# Patient Record
Sex: FEMALE | Race: BLACK OR AFRICAN AMERICAN | NOT HISPANIC OR LATINO | Employment: OTHER | URBAN - METROPOLITAN AREA
[De-identification: names, ages, dates, MRNs, and addresses within clinical notes are randomized per-mention and may not be internally consistent; named-entity substitution may affect disease eponyms.]

---

## 2017-03-27 ENCOUNTER — APPOINTMENT (OUTPATIENT)
Dept: MAMMOGRAPHY | Facility: CLINIC | Age: 62
End: 2017-03-27

## 2017-03-27 ENCOUNTER — OFFICE VISIT (OUTPATIENT)
Dept: OBSTETRICS AND GYNECOLOGY | Facility: CLINIC | Age: 62
End: 2017-03-27

## 2017-03-27 VITALS
BODY MASS INDEX: 25.27 KG/M2 | DIASTOLIC BLOOD PRESSURE: 74 MMHG | SYSTOLIC BLOOD PRESSURE: 118 MMHG | HEIGHT: 64 IN | WEIGHT: 148 LBS

## 2017-03-27 DIAGNOSIS — M81.0 OSTEOPOROSIS OF VERTEBRA: ICD-10-CM

## 2017-03-27 DIAGNOSIS — Z13.220 SCREENING FOR LIPID DISORDERS: ICD-10-CM

## 2017-03-27 DIAGNOSIS — Z13.29 SCREENING FOR THYROID DISORDER: ICD-10-CM

## 2017-03-27 DIAGNOSIS — Z78.0 MENOPAUSE: ICD-10-CM

## 2017-03-27 DIAGNOSIS — Z13.228 SCREENING FOR METABOLIC DISORDER: ICD-10-CM

## 2017-03-27 DIAGNOSIS — Z12.31 VISIT FOR SCREENING MAMMOGRAM: ICD-10-CM

## 2017-03-27 DIAGNOSIS — Z01.411 ENCOUNTER FOR GYNECOLOGICAL EXAMINATION WITH ABNORMAL FINDING: Primary | ICD-10-CM

## 2017-03-27 PROCEDURE — 99396 PREV VISIT EST AGE 40-64: CPT | Performed by: OBSTETRICS & GYNECOLOGY

## 2017-03-27 PROCEDURE — 77067 SCR MAMMO BI INCL CAD: CPT | Performed by: OBSTETRICS & GYNECOLOGY

## 2017-03-27 RX ORDER — ALENDRONATE SODIUM 70 MG/75ML
70 SOLUTION ORAL
Qty: 4 ML | Refills: 12 | Status: SHIPPED | OUTPATIENT
Start: 2017-03-27 | End: 2018-11-14

## 2017-03-27 NOTE — PROGRESS NOTES
Subjective     Julissa Armenta is a 61 y.o. female for annual gyn exam    History of Present Illness   61-year-old postmenopausal black female presents for yearly exam.  She denies vasomotor symptoms and has had no vaginal bleeding.  She has a history of osteoporosis of the spine and was started on Fosamax last year but discontinued the medication for a none known reason.  She is a  and has been taking nutritional supplementation for bone health.  She has a history of colon cancer in her grandmother and received a screening colonoscopy last year which was reassuring.  She received a mammogram in the office today.  Her last bone density exam was in November 2015.  He has no complaints.      The following portions of the patient's history were reviewed and updated as appropriate: allergies, current medications, past family history, past medical history, past social history, past surgical history and problem list.      Review of Systems   Constitutional: Negative for activity change, appetite change, fatigue and unexpected weight change.   HENT: Negative.    Eyes: Negative.    Respiratory: Negative.    Cardiovascular: Negative.    Gastrointestinal: Negative for abdominal distention, abdominal pain, anal bleeding, constipation, diarrhea, nausea and vomiting.   Endocrine: Negative for cold intolerance, heat intolerance, polydipsia, polyphagia and polyuria.   Genitourinary: Negative for difficulty urinating, dysuria, flank pain, frequency, hematuria and urgency.   Musculoskeletal: Negative.    Skin: Negative.    Allergic/Immunologic: Negative.    Neurological: Negative.    Hematological: Negative.    Psychiatric/Behavioral: Negative.        Objective     Physical Exam   Constitutional: She is oriented to person, place, and time. Vital signs are normal. She appears well-developed and well-nourished. She is cooperative.   HENT:   Head: Normocephalic.   Eyes: Conjunctivae are normal. Pupils are equal, round,  and reactive to light.   Neck: Normal range of motion. Neck supple. No tracheal deviation present. No thyromegaly present.   Cardiovascular: Normal rate, regular rhythm and normal heart sounds.  Exam reveals no gallop and no friction rub.    No murmur heard.  Pulmonary/Chest: Effort normal and breath sounds normal. No respiratory distress. She has no wheezes. Right breast exhibits no inverted nipple, no mass, no nipple discharge, no skin change and no tenderness. Left breast exhibits no inverted nipple, no mass, no nipple discharge, no skin change and no tenderness. Breasts are symmetrical. There is no breast swelling.   Abdominal: Soft. Bowel sounds are normal. She exhibits no distension, no ascites and no mass. There is no hepatosplenomegaly. There is no tenderness. There is no rebound, no guarding and no CVA tenderness. No hernia. Hernia confirmed negative in the right inguinal area and confirmed negative in the left inguinal area.   Genitourinary: Rectal exam shows no fissure, no mass, no tenderness and anal tone normal. Pelvic exam was performed with patient supine. No labial fusion. There is no rash, tenderness, lesion or injury on the right labia. There is no rash, tenderness, lesion or injury on the left labia. Uterus is not deviated, not enlarged, not fixed and not tender. Cervix exhibits no motion tenderness, no discharge and no friability. Right adnexum displays no mass, no tenderness and no fullness. Left adnexum displays no mass, no tenderness and no fullness. No erythema, tenderness or bleeding in the vagina. No foreign body in the vagina. No signs of injury around the vagina. No vaginal discharge found.   Musculoskeletal: Normal range of motion. She exhibits no edema or deformity.   Lymphadenopathy:     She has no cervical adenopathy.        Right: No inguinal adenopathy present.        Left: No inguinal adenopathy present.   Neurological: She is alert and oriented to person, place, and time. She has  normal reflexes. No cranial nerve deficit. Coordination normal.   Skin: Skin is warm and dry. No rash noted. No erythema.   Psychiatric: She has a normal mood and affect. Her behavior is normal.         Julissa was seen today for gynecologic exam.    Diagnoses and all orders for this visit:    Encounter for gynecological examination with abnormal finding  -     Pap IG, Rfx HPV ASCU    Menopause    Osteoporosis of vertebra  -     alendronate (FOSAMAX) 70 MG/75ML solution; Take 75 mL by mouth Every 7 (Seven) Days.    Screening for thyroid disorder  -     Thyroid Panel With TSH    Screening for metabolic disorder  -     CBC & Differential  -     Comprehensive Metabolic Panel  -     Hemoglobin A1c    Screening for lipid disorders  -     Lipid Panel     patient is doing well.  She agreed to retry starting the Fosamax for her osteoporosis.  She will call with any issues.  She will call for her lab results.

## 2017-03-28 LAB
ALBUMIN SERPL-MCNC: 4.3 G/DL (ref 3.5–5.2)
ALBUMIN/GLOB SERPL: 1.5 G/DL
ALP SERPL-CCNC: 78 U/L (ref 39–117)
ALT SERPL-CCNC: 25 U/L (ref 1–33)
AST SERPL-CCNC: 28 U/L (ref 1–32)
BASOPHILS # BLD AUTO: 0.02 10*3/MM3 (ref 0–0.2)
BASOPHILS NFR BLD AUTO: 0.5 % (ref 0–1.5)
BILIRUB SERPL-MCNC: 0.4 MG/DL (ref 0.1–1.2)
BUN SERPL-MCNC: 16 MG/DL (ref 8–23)
BUN/CREAT SERPL: 21.6 (ref 7–25)
CALCIUM SERPL-MCNC: 9.4 MG/DL (ref 8.6–10.5)
CHLORIDE SERPL-SCNC: 104 MMOL/L (ref 98–107)
CHOLEST SERPL-MCNC: 211 MG/DL (ref 0–200)
CO2 SERPL-SCNC: 26.9 MMOL/L (ref 22–29)
CREAT SERPL-MCNC: 0.74 MG/DL (ref 0.57–1)
EOSINOPHIL # BLD AUTO: 0.07 10*3/MM3 (ref 0–0.7)
EOSINOPHIL NFR BLD AUTO: 1.6 % (ref 0.3–6.2)
ERYTHROCYTE [DISTWIDTH] IN BLOOD BY AUTOMATED COUNT: 13.4 % (ref 11.7–13)
FT4I SERPL CALC-MCNC: 1.5 (ref 1.2–4.9)
GLOBULIN SER CALC-MCNC: 2.8 GM/DL
GLUCOSE SERPL-MCNC: 93 MG/DL (ref 65–99)
HBA1C MFR BLD: 5.8 % (ref 4.8–5.6)
HCT VFR BLD AUTO: 41.4 % (ref 35.6–45.5)
HDLC SERPL-MCNC: 90 MG/DL (ref 40–60)
HGB BLD-MCNC: 13.3 G/DL (ref 11.9–15.5)
IMM GRANULOCYTES # BLD: 0 10*3/MM3 (ref 0–0.03)
IMM GRANULOCYTES NFR BLD: 0 % (ref 0–0.5)
LDLC SERPL CALC-MCNC: 110 MG/DL (ref 0–100)
LYMPHOCYTES # BLD AUTO: 1.49 10*3/MM3 (ref 0.9–4.8)
LYMPHOCYTES NFR BLD AUTO: 34.3 % (ref 19.6–45.3)
MCH RBC QN AUTO: 29.8 PG (ref 26.9–32)
MCHC RBC AUTO-ENTMCNC: 32.1 G/DL (ref 32.4–36.3)
MCV RBC AUTO: 92.8 FL (ref 80.5–98.2)
MONOCYTES # BLD AUTO: 0.27 10*3/MM3 (ref 0.2–1.2)
MONOCYTES NFR BLD AUTO: 6.2 % (ref 5–12)
NEUTROPHILS # BLD AUTO: 2.5 10*3/MM3 (ref 1.9–8.1)
NEUTROPHILS NFR BLD AUTO: 57.4 % (ref 42.7–76)
PLATELET # BLD AUTO: 209 10*3/MM3 (ref 140–500)
POTASSIUM SERPL-SCNC: 4 MMOL/L (ref 3.5–5.2)
PROT SERPL-MCNC: 7.1 G/DL (ref 6–8.5)
RBC # BLD AUTO: 4.46 10*6/MM3 (ref 3.9–5.2)
SODIUM SERPL-SCNC: 144 MMOL/L (ref 136–145)
T3RU NFR SERPL: 29 % (ref 24–39)
T4 SERPL-MCNC: 5.2 UG/DL (ref 4.5–12)
TRIGL SERPL-MCNC: 53 MG/DL (ref 0–150)
TSH SERPL DL<=0.005 MIU/L-ACNC: 1.38 UIU/ML (ref 0.45–4.5)
VLDLC SERPL CALC-MCNC: 10.6 MG/DL (ref 5–40)
WBC # BLD AUTO: 4.35 10*3/MM3 (ref 4.5–10.7)

## 2017-03-30 LAB
CONV .: NORMAL
CYTOLOGIST CVX/VAG CYTO: NORMAL
CYTOLOGY CVX/VAG DOC THIN PREP: NORMAL
DX ICD CODE: NORMAL
HIV 1 & 2 AB SER-IMP: NORMAL
Lab: NORMAL
OTHER STN SPEC: NORMAL
PATH REPORT.FINAL DX SPEC: NORMAL
STAT OF ADQ CVX/VAG CYTO-IMP: NORMAL

## 2018-04-18 ENCOUNTER — TRANSCRIBE ORDERS (OUTPATIENT)
Dept: ADMINISTRATIVE | Facility: HOSPITAL | Age: 63
End: 2018-04-18

## 2018-04-18 DIAGNOSIS — Z12.39 SCREENING BREAST EXAMINATION: Primary | ICD-10-CM

## 2018-05-14 ENCOUNTER — HOSPITAL ENCOUNTER (OUTPATIENT)
Dept: MAMMOGRAPHY | Facility: HOSPITAL | Age: 63
Discharge: HOME | End: 2018-05-14
Attending: OBSTETRICS & GYNECOLOGY | Admitting: OBSTETRICS & GYNECOLOGY

## 2018-05-14 ENCOUNTER — OFFICE VISIT (OUTPATIENT)
Dept: OBSTETRICS AND GYNECOLOGY | Facility: CLINIC | Age: 63
End: 2018-05-14

## 2018-05-14 VITALS
WEIGHT: 150 LBS | DIASTOLIC BLOOD PRESSURE: 78 MMHG | BODY MASS INDEX: 25.61 KG/M2 | HEIGHT: 64 IN | SYSTOLIC BLOOD PRESSURE: 120 MMHG

## 2018-05-14 DIAGNOSIS — Z13.820 SCREENING FOR OSTEOPOROSIS: ICD-10-CM

## 2018-05-14 DIAGNOSIS — Z78.0 MENOPAUSE: ICD-10-CM

## 2018-05-14 DIAGNOSIS — Z01.419 ENCOUNTER FOR GYNECOLOGICAL EXAMINATION WITHOUT ABNORMAL FINDING: Primary | ICD-10-CM

## 2018-05-14 DIAGNOSIS — Z12.39 SCREENING BREAST EXAMINATION: ICD-10-CM

## 2018-05-14 DIAGNOSIS — Z78.0 POSTMENOPAUSAL: ICD-10-CM

## 2018-05-14 DIAGNOSIS — Z13.820 SCREENING FOR OSTEOPOROSIS: Primary | ICD-10-CM

## 2018-05-14 LAB
ALBUMIN SERPL-MCNC: 4.3 G/DL (ref 3.5–5.2)
ALBUMIN/GLOB SERPL: 1.7 G/DL
ALP SERPL-CCNC: 76 U/L (ref 39–117)
ALT SERPL-CCNC: 19 U/L (ref 1–33)
AST SERPL-CCNC: 24 U/L (ref 1–32)
BASOPHILS # BLD AUTO: 0.02 10*3/MM3 (ref 0–0.2)
BASOPHILS NFR BLD AUTO: 0.4 % (ref 0–1.5)
BILIRUB SERPL-MCNC: 0.3 MG/DL (ref 0.1–1.2)
BUN SERPL-MCNC: 16 MG/DL (ref 8–23)
BUN/CREAT SERPL: 19.3 (ref 7–25)
CALCIUM SERPL-MCNC: 9.5 MG/DL (ref 8.6–10.5)
CHLORIDE SERPL-SCNC: 105 MMOL/L (ref 98–107)
CHOLEST SERPL-MCNC: 213 MG/DL (ref 0–200)
CO2 SERPL-SCNC: 31.1 MMOL/L (ref 22–29)
CREAT SERPL-MCNC: 0.83 MG/DL (ref 0.57–1)
EOSINOPHIL # BLD AUTO: 0.09 10*3/MM3 (ref 0–0.7)
EOSINOPHIL NFR BLD AUTO: 1.8 % (ref 0.3–6.2)
ERYTHROCYTE [DISTWIDTH] IN BLOOD BY AUTOMATED COUNT: 13.5 % (ref 11.7–13)
GFR SERPLBLD CREATININE-BSD FMLA CKD-EPI: 69 ML/MIN/1.73
GFR SERPLBLD CREATININE-BSD FMLA CKD-EPI: 84 ML/MIN/1.73
GLOBULIN SER CALC-MCNC: 2.6 GM/DL
GLUCOSE SERPL-MCNC: 98 MG/DL (ref 65–99)
HCT VFR BLD AUTO: 42.8 % (ref 35.6–45.5)
HDLC SERPL-MCNC: 84 MG/DL (ref 40–60)
HGB BLD-MCNC: 13.5 G/DL (ref 11.9–15.5)
IMM GRANULOCYTES # BLD: 0 10*3/MM3 (ref 0–0.03)
IMM GRANULOCYTES NFR BLD: 0 % (ref 0–0.5)
LDLC SERPL CALC-MCNC: 119 MG/DL (ref 0–100)
LYMPHOCYTES # BLD AUTO: 1.53 10*3/MM3 (ref 0.9–4.8)
LYMPHOCYTES NFR BLD AUTO: 31.3 % (ref 19.6–45.3)
MCH RBC QN AUTO: 29.7 PG (ref 26.9–32)
MCHC RBC AUTO-ENTMCNC: 31.5 G/DL (ref 32.4–36.3)
MCV RBC AUTO: 94.3 FL (ref 80.5–98.2)
MONOCYTES # BLD AUTO: 0.42 10*3/MM3 (ref 0.2–1.2)
MONOCYTES NFR BLD AUTO: 8.6 % (ref 5–12)
NEUTROPHILS # BLD AUTO: 2.83 10*3/MM3 (ref 1.9–8.1)
NEUTROPHILS NFR BLD AUTO: 57.9 % (ref 42.7–76)
PLATELET # BLD AUTO: 219 10*3/MM3 (ref 140–500)
POTASSIUM SERPL-SCNC: 5 MMOL/L (ref 3.5–5.2)
PROT SERPL-MCNC: 6.9 G/DL (ref 6–8.5)
RBC # BLD AUTO: 4.54 10*6/MM3 (ref 3.9–5.2)
SODIUM SERPL-SCNC: 144 MMOL/L (ref 136–145)
TRIGL SERPL-MCNC: 52 MG/DL (ref 0–150)
TSH SERPL DL<=0.005 MIU/L-ACNC: 1.51 MIU/ML (ref 0.27–4.2)
VLDLC SERPL CALC-MCNC: 10.4 MG/DL (ref 5–40)
WBC # BLD AUTO: 4.89 10*3/MM3 (ref 4.5–10.7)

## 2018-05-14 PROCEDURE — 99396 PREV VISIT EST AGE 40-64: CPT | Performed by: OBSTETRICS & GYNECOLOGY

## 2018-05-14 PROCEDURE — 77063 BREAST TOMOSYNTHESIS BI: CPT

## 2018-05-14 PROCEDURE — 77067 SCR MAMMO BI INCL CAD: CPT

## 2018-05-14 NOTE — PROGRESS NOTES
Subjective    Julissa Armenta is a 62 y.o. female for annual gyn exam    History of Present Illness   62-year-old postmenopausal black female presents for routine gynecologic exam.  She denies vasomotor symptoms or vaginal bleeding.  She is not using any form of hormone replacement therapy.  She has a history of borderline osteoporosis.  She has not had a bone density assessment in several years.  She received a mammogram today.  She is current on her screening colonoscopies.  There is no family history of breast cancer.  The patient has no complaints.      The following portions of the patient's history were reviewed and updated as appropriate: allergies, current medications, past family history, past medical history, past social history, past surgical history and problem list.      Review of Systems   Constitutional: Negative for activity change, appetite change, fatigue and unexpected weight change.   HENT: Negative.    Eyes: Negative.    Respiratory: Negative.    Cardiovascular: Negative.    Gastrointestinal: Negative for abdominal distention, abdominal pain, anal bleeding, constipation, diarrhea, nausea and vomiting.   Endocrine: Negative for cold intolerance, heat intolerance, polydipsia, polyphagia and polyuria.   Genitourinary: Negative for difficulty urinating, dysuria, flank pain, frequency, hematuria, pelvic pain, urgency, vaginal bleeding and vaginal discharge.   Musculoskeletal: Negative.    Skin: Negative.    Allergic/Immunologic: Negative.    Neurological: Negative.    Hematological: Negative.    Psychiatric/Behavioral: Negative.            Physical Exam   Constitutional: She is oriented to person, place, and time. Vital signs are normal. She appears well-developed and well-nourished. She is cooperative.   HENT:   Head: Normocephalic.   Eyes: Conjunctivae are normal. Pupils are equal, round, and reactive to light.   Neck: Normal range of motion. Neck supple. No tracheal deviation present. No  thyromegaly present.   Cardiovascular: Normal rate, regular rhythm and normal heart sounds.  Exam reveals no gallop and no friction rub.    No murmur heard.  Pulmonary/Chest: Effort normal and breath sounds normal. No respiratory distress. She has no wheezes. Right breast exhibits no inverted nipple, no mass, no nipple discharge, no skin change and no tenderness. Left breast exhibits no inverted nipple, no mass, no nipple discharge, no skin change and no tenderness. Breasts are symmetrical. There is no breast swelling.   Abdominal: Soft. Bowel sounds are normal. She exhibits no distension, no ascites and no mass. There is no hepatosplenomegaly. There is no tenderness. There is no rebound, no guarding and no CVA tenderness. No hernia. Hernia confirmed negative in the right inguinal area and confirmed negative in the left inguinal area.   Genitourinary: Rectal exam shows no fissure, no mass, no tenderness and anal tone normal. Pelvic exam was performed with patient supine. No labial fusion. There is no rash, tenderness, lesion or injury on the right labia. There is no rash, tenderness, lesion or injury on the left labia. Uterus is not deviated, not enlarged, not fixed and not tender. Cervix exhibits no motion tenderness, no discharge and no friability. Right adnexum displays no mass, no tenderness and no fullness. Left adnexum displays no mass, no tenderness and no fullness. No erythema, tenderness or bleeding in the vagina. No foreign body in the vagina. No signs of injury around the vagina. No vaginal discharge found.   Genitourinary Comments: There is vulvovaginal atrophy.   Musculoskeletal: Normal range of motion. She exhibits no edema or deformity.   Lymphadenopathy:     She has no cervical adenopathy.        Right: No inguinal adenopathy present.        Left: No inguinal adenopathy present.   Neurological: She is alert and oriented to person, place, and time. She has normal reflexes. No cranial nerve deficit.  Coordination normal.   Skin: Skin is warm and dry. No rash noted. No erythema.   Psychiatric: She has a normal mood and affect. Her behavior is normal.         Julissa was seen today for gynecologic exam.    Diagnoses and all orders for this visit:    Encounter for gynecological examination without abnormal finding  -     CBC & Differential  -     Comprehensive Metabolic Panel  -     TSH  -     Lipid Panel  -     Pap IG, Rfx HPV ASCU    Menopause    Screening for osteoporosis  -     HM DEXA SCAN     patient is doing well.  She has not found a primary care physician and requests basic laboratory testing.  I encouraged continued nutritional supplementation and weightbearing exercise for bone health.  Annual exams were recommended.

## 2018-05-16 LAB
CONV .: NORMAL
CYTOLOGIST CVX/VAG CYTO: NORMAL
CYTOLOGY CVX/VAG DOC THIN PREP: NORMAL
DX ICD CODE: NORMAL
HIV 1 & 2 AB SER-IMP: NORMAL
OTHER STN SPEC: NORMAL
PATH REPORT.FINAL DX SPEC: NORMAL
STAT OF ADQ CVX/VAG CYTO-IMP: NORMAL

## 2018-11-14 ENCOUNTER — APPOINTMENT (OUTPATIENT)
Dept: MRI IMAGING | Facility: HOSPITAL | Age: 63
End: 2018-11-14

## 2018-11-14 ENCOUNTER — HOSPITAL ENCOUNTER (OUTPATIENT)
Facility: HOSPITAL | Age: 63
Setting detail: OBSERVATION
Discharge: HOME | End: 2018-11-15
Attending: EMERGENCY MEDICINE | Admitting: HOSPITALIST

## 2018-11-14 ENCOUNTER — APPOINTMENT (OUTPATIENT)
Dept: CT IMAGING | Facility: HOSPITAL | Age: 63
End: 2018-11-14

## 2018-11-14 DIAGNOSIS — G44.59 HEADACHE SYNDROME, COMPLICATED: Primary | ICD-10-CM

## 2018-11-14 DIAGNOSIS — I10 HYPERTENSION, UNSPECIFIED TYPE: ICD-10-CM

## 2018-11-14 DIAGNOSIS — R20.0 LEFT SIDED NUMBNESS: ICD-10-CM

## 2018-11-14 PROBLEM — R26.81 GAIT INSTABILITY: Status: ACTIVE | Noted: 2018-11-14

## 2018-11-14 PROBLEM — G43.109 COMPLICATED MIGRAINE: Status: ACTIVE | Noted: 2018-11-14

## 2018-11-14 PROBLEM — G45.9 TIA (TRANSIENT ISCHEMIC ATTACK): Status: ACTIVE | Noted: 2018-11-14

## 2018-11-14 LAB
ALBUMIN SERPL-MCNC: 4.2 G/DL (ref 3.5–5.2)
ALBUMIN/GLOB SERPL: 1.4 G/DL
ALP SERPL-CCNC: 71 U/L (ref 39–117)
ALT SERPL W P-5'-P-CCNC: 19 U/L (ref 1–33)
ANION GAP SERPL CALCULATED.3IONS-SCNC: 10.9 MMOL/L
AST SERPL-CCNC: 24 U/L (ref 1–32)
BASOPHILS # BLD AUTO: 0.01 10*3/MM3 (ref 0–0.2)
BASOPHILS NFR BLD AUTO: 0.2 % (ref 0–1.5)
BILIRUB SERPL-MCNC: 0.4 MG/DL (ref 0.1–1.2)
BUN BLD-MCNC: 18 MG/DL (ref 8–23)
BUN/CREAT SERPL: 24.3 (ref 7–25)
CALCIUM SPEC-SCNC: 9.3 MG/DL (ref 8.6–10.5)
CHLORIDE SERPL-SCNC: 107 MMOL/L (ref 98–107)
CO2 SERPL-SCNC: 24.1 MMOL/L (ref 22–29)
CREAT BLD-MCNC: 0.74 MG/DL (ref 0.57–1)
DEPRECATED RDW RBC AUTO: 44.2 FL (ref 37–54)
EOSINOPHIL # BLD AUTO: 0.02 10*3/MM3 (ref 0–0.7)
EOSINOPHIL NFR BLD AUTO: 0.4 % (ref 0.3–6.2)
ERYTHROCYTE [DISTWIDTH] IN BLOOD BY AUTOMATED COUNT: 13.2 % (ref 11.7–13)
GFR SERPL CREATININE-BSD FRML MDRD: 96 ML/MIN/1.73
GLOBULIN UR ELPH-MCNC: 3.1 GM/DL
GLUCOSE BLD-MCNC: 110 MG/DL (ref 65–99)
GLUCOSE BLDC GLUCOMTR-MCNC: 101 MG/DL (ref 70–130)
GLUCOSE BLDC GLUCOMTR-MCNC: 98 MG/DL (ref 70–130)
HCT VFR BLD AUTO: 39 % (ref 35.6–45.5)
HGB BLD-MCNC: 12.8 G/DL (ref 11.9–15.5)
HOLD SPECIMEN: NORMAL
HOLD SPECIMEN: NORMAL
IMM GRANULOCYTES # BLD: 0 10*3/MM3 (ref 0–0.03)
IMM GRANULOCYTES NFR BLD: 0 % (ref 0–0.5)
LYMPHOCYTES # BLD AUTO: 0.69 10*3/MM3 (ref 0.9–4.8)
LYMPHOCYTES NFR BLD AUTO: 14.9 % (ref 19.6–45.3)
MCH RBC QN AUTO: 29.9 PG (ref 26.9–32)
MCHC RBC AUTO-ENTMCNC: 32.8 G/DL (ref 32.4–36.3)
MCV RBC AUTO: 91.1 FL (ref 80.5–98.2)
MONOCYTES # BLD AUTO: 0.17 10*3/MM3 (ref 0.2–1.2)
MONOCYTES NFR BLD AUTO: 3.7 % (ref 5–12)
NEUTROPHILS # BLD AUTO: 3.75 10*3/MM3 (ref 1.9–8.1)
NEUTROPHILS NFR BLD AUTO: 80.8 % (ref 42.7–76)
PLATELET # BLD AUTO: 225 10*3/MM3 (ref 140–500)
PMV BLD AUTO: 9.7 FL (ref 6–12)
POTASSIUM BLD-SCNC: 3.8 MMOL/L (ref 3.5–5.2)
PROT SERPL-MCNC: 7.3 G/DL (ref 6–8.5)
RBC # BLD AUTO: 4.28 10*6/MM3 (ref 3.9–5.2)
SODIUM BLD-SCNC: 142 MMOL/L (ref 136–145)
TROPONIN T SERPL-MCNC: <0.01 NG/ML (ref 0–0.03)
WBC NRBC COR # BLD: 4.64 10*3/MM3 (ref 4.5–10.7)
WHOLE BLOOD HOLD SPECIMEN: NORMAL
WHOLE BLOOD HOLD SPECIMEN: NORMAL

## 2018-11-14 PROCEDURE — 93010 ELECTROCARDIOGRAM REPORT: CPT | Performed by: INTERNAL MEDICINE

## 2018-11-14 PROCEDURE — 85025 COMPLETE CBC W/AUTO DIFF WBC: CPT | Performed by: PHYSICIAN ASSISTANT

## 2018-11-14 PROCEDURE — 0 IOPAMIDOL PER 1 ML: Performed by: EMERGENCY MEDICINE

## 2018-11-14 PROCEDURE — 70496 CT ANGIOGRAPHY HEAD: CPT

## 2018-11-14 PROCEDURE — 82565 ASSAY OF CREATININE: CPT

## 2018-11-14 PROCEDURE — 93005 ELECTROCARDIOGRAM TRACING: CPT | Performed by: PHYSICIAN ASSISTANT

## 2018-11-14 PROCEDURE — 82962 GLUCOSE BLOOD TEST: CPT

## 2018-11-14 PROCEDURE — 70553 MRI BRAIN STEM W/O & W/DYE: CPT

## 2018-11-14 PROCEDURE — 80053 COMPREHEN METABOLIC PANEL: CPT | Performed by: PHYSICIAN ASSISTANT

## 2018-11-14 PROCEDURE — 99244 OFF/OP CNSLTJ NEW/EST MOD 40: CPT | Performed by: INTERNAL MEDICINE

## 2018-11-14 PROCEDURE — G0378 HOSPITAL OBSERVATION PER HR: HCPCS

## 2018-11-14 PROCEDURE — 99285 EMERGENCY DEPT VISIT HI MDM: CPT

## 2018-11-14 PROCEDURE — 84484 ASSAY OF TROPONIN QUANT: CPT | Performed by: PHYSICIAN ASSISTANT

## 2018-11-14 PROCEDURE — 70498 CT ANGIOGRAPHY NECK: CPT

## 2018-11-14 PROCEDURE — 0 GADOBENATE DIMEGLUMINE 529 MG/ML SOLUTION: Performed by: HOSPITALIST

## 2018-11-14 PROCEDURE — 0042T HC CT CEREBRAL PERFUSION W/WO CONTRAST: CPT

## 2018-11-14 PROCEDURE — A9577 INJ MULTIHANCE: HCPCS | Performed by: HOSPITALIST

## 2018-11-14 PROCEDURE — 99245 OFF/OP CONSLTJ NEW/EST HI 55: CPT | Performed by: PSYCHIATRY & NEUROLOGY

## 2018-11-14 RX ORDER — KETOROLAC TROMETHAMINE 15 MG/ML
15 INJECTION, SOLUTION INTRAMUSCULAR; INTRAVENOUS ONCE
Status: DISCONTINUED | OUTPATIENT
Start: 2018-11-14 | End: 2018-11-14

## 2018-11-14 RX ORDER — ONDANSETRON 2 MG/ML
4 INJECTION INTRAMUSCULAR; INTRAVENOUS EVERY 6 HOURS PRN
Status: DISCONTINUED | OUTPATIENT
Start: 2018-11-14 | End: 2018-11-15 | Stop reason: HOSPADM

## 2018-11-14 RX ORDER — SODIUM CHLORIDE 0.9 % (FLUSH) 0.9 %
3 SYRINGE (ML) INJECTION EVERY 12 HOURS SCHEDULED
Status: DISCONTINUED | OUTPATIENT
Start: 2018-11-14 | End: 2018-11-15 | Stop reason: HOSPADM

## 2018-11-14 RX ORDER — SODIUM CHLORIDE 0.9 % (FLUSH) 0.9 %
3-10 SYRINGE (ML) INJECTION AS NEEDED
Status: DISCONTINUED | OUTPATIENT
Start: 2018-11-14 | End: 2018-11-15 | Stop reason: HOSPADM

## 2018-11-14 RX ORDER — ASPIRIN 300 MG/1
300 SUPPOSITORY RECTAL DAILY
Status: DISCONTINUED | OUTPATIENT
Start: 2018-11-14 | End: 2018-11-15 | Stop reason: HOSPADM

## 2018-11-14 RX ORDER — ACETAMINOPHEN 325 MG/1
650 TABLET ORAL EVERY 6 HOURS PRN
Status: DISCONTINUED | OUTPATIENT
Start: 2018-11-14 | End: 2018-11-15 | Stop reason: HOSPADM

## 2018-11-14 RX ORDER — ONDANSETRON 4 MG/1
4 TABLET, FILM COATED ORAL EVERY 6 HOURS PRN
Status: DISCONTINUED | OUTPATIENT
Start: 2018-11-14 | End: 2018-11-15 | Stop reason: HOSPADM

## 2018-11-14 RX ORDER — ASPIRIN 325 MG
325 TABLET ORAL DAILY
Status: DISCONTINUED | OUTPATIENT
Start: 2018-11-14 | End: 2018-11-15 | Stop reason: HOSPADM

## 2018-11-14 RX ORDER — ASPIRIN 300 MG/1
300 SUPPOSITORY RECTAL DAILY
Status: DISCONTINUED | OUTPATIENT
Start: 2018-11-14 | End: 2018-11-14

## 2018-11-14 RX ORDER — SODIUM CHLORIDE 0.9 % (FLUSH) 0.9 %
10 SYRINGE (ML) INJECTION AS NEEDED
Status: DISCONTINUED | OUTPATIENT
Start: 2018-11-14 | End: 2018-11-15 | Stop reason: HOSPADM

## 2018-11-14 RX ORDER — AMLODIPINE BESYLATE 5 MG/1
5 TABLET ORAL
Status: DISCONTINUED | OUTPATIENT
Start: 2018-11-15 | End: 2018-11-15 | Stop reason: HOSPADM

## 2018-11-14 RX ORDER — ONDANSETRON 4 MG/1
4 TABLET, ORALLY DISINTEGRATING ORAL EVERY 6 HOURS PRN
Status: DISCONTINUED | OUTPATIENT
Start: 2018-11-14 | End: 2018-11-15 | Stop reason: HOSPADM

## 2018-11-14 RX ORDER — ATORVASTATIN CALCIUM 80 MG/1
80 TABLET, FILM COATED ORAL NIGHTLY
Status: DISCONTINUED | OUTPATIENT
Start: 2018-11-14 | End: 2018-11-15 | Stop reason: HOSPADM

## 2018-11-14 RX ORDER — ASPIRIN 81 MG/1
81 TABLET, CHEWABLE ORAL DAILY
Status: DISCONTINUED | OUTPATIENT
Start: 2018-11-14 | End: 2018-11-14

## 2018-11-14 RX ORDER — LABETALOL HYDROCHLORIDE 5 MG/ML
20 INJECTION, SOLUTION INTRAVENOUS ONCE
Status: DISCONTINUED | OUTPATIENT
Start: 2018-11-14 | End: 2018-11-14

## 2018-11-14 RX ADMIN — Medication 3 ML: at 20:09

## 2018-11-14 RX ADMIN — ATORVASTATIN CALCIUM 80 MG: 80 TABLET, FILM COATED ORAL at 20:08

## 2018-11-14 RX ADMIN — ASPIRIN 325 MG: 325 TABLET ORAL at 20:08

## 2018-11-14 RX ADMIN — IOPAMIDOL 150 ML: 755 INJECTION, SOLUTION INTRAVENOUS at 13:34

## 2018-11-14 RX ADMIN — GADOBENATE DIMEGLUMINE 14 ML: 529 INJECTION, SOLUTION INTRAVENOUS at 22:15

## 2018-11-14 NOTE — H&P
HISTORY AND PHYSICAL   [facility]        Patient Identification:  Name: Julissa Armenta  Age: 62 y.o.  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  She states that she came to the ER because she had the acute onset of headache today that was associated with dizziness and all this began a partially 645 this morning.  She states that she has a diagnosis of migraine headache in the past but she's never had any neurological deficits associated with one.  She states that as her day went on a proximally around the 10:00 hours started to get progressively worse.  She started to have some gait and balance and instability at that juncture but did not have any loss of consciousness and had no preceding aspects of chest pain.  A team D was called in the ER and the patient underwent CT angiogram as well as perfusion and those reports while pending work conveyed to me as being negative.  Her initial lab work in the ER has been unremarkable though her EKG is slightly abnormal.  She denies any past cardiac history such as atrial fibrillation heart failure or any other forms of heart disease.  Her blood pressures were quite elevated at admission and she denies any known previous diagnosis of hypertension.  She states when it's been checked in the past her blood pressures were always therapeutic.  She did receive IV labetalol in the ER.  Patient has alreadry been evaluated by neurologist Dr. Satya Cope and I have discussed the case with him as well as with the ER physician Dr. Mohan Whitfield.  At this juncture the focus of the remainder of the workup is towards MRI with and without contrast as well as echocardiogram and PT evaluation.  Patient will be  maintained on aspirin and statin for now with further recommendations to follow    Past Medical History:  Past Medical History:   Diagnosis Date   • Disease of thyroid gland      Past Surgical History:  Past Surgical History:   Procedure Laterality Date   • COLONOSCOPY      2015   • FINGER SURGERY     • THYROID SURGERY        Home Meds:    (Not in a hospital admission)    Allergies:  Allergies   Allergen Reactions   • Cephalexin Rash     Immunizations:    There is no immunization history on file for this patient.  Social History:   Social History     Social History Narrative   • Not on file     Social History     Socioeconomic History   • Marital status: Single     Spouse name: Not on file   • Number of children: Not on file   • Years of education: Not on file   • Highest education level: Not on file   Social Needs   • Financial resource strain: Not on file   • Food insecurity - worry: Not on file   • Food insecurity - inability: Not on file   • Transportation needs - medical: Not on file   • Transportation needs - non-medical: Not on file   Occupational History   • Not on file   Tobacco Use   • Smoking status: Never Smoker   • Smokeless tobacco: Never Used   Substance and Sexual Activity   • Alcohol use: No   • Drug use: No   • Sexual activity: Defer   Other Topics Concern   • Not on file   Social History Narrative   • Not on file       Family History:  Family History   Problem Relation Age of Onset   • Colon cancer Maternal Grandmother         Review of Systems  See history of present illness and past medical history.  Patient denies any focal changes to her smell taste or sound though admits to dizziness without loss of consciousness or loss of function though she did have associated gait instability and ataxia.  She has had some photophobia but denies any focal loss of vision.  Admits to some headache as well as neck discomfort but denies any problems swallowing though she has had a past history of esophageal  "dilatation.  She denies chest pain palpitations cough shortness of breath abdominal pain dysuria bruising bleeding.  Remainder of ROS is negative.    Objective:  tMax 24 hrs: Temp (24hrs), Av.3 °F (36.3 °C), Min:97.3 °F (36.3 °C), Max:97.3 °F (36.3 °C)    Vitals Ranges:   Temp:  [97.3 °F (36.3 °C)] 97.3 °F (36.3 °C)  Heart Rate:  [64-92] 64  Resp:  [16-18] 18  BP: (161-192)/() 176/96      Exam:  /96   Pulse 64   Temp 97.3 °F (36.3 °C) (Tympanic)   Resp 18   Ht 162.6 cm (64\")   Wt 68.1 kg (150 lb 2.1 oz)   SpO2 99%   BMI 25.77 kg/m²     General Appearance:    Alert, cooperative, no distress, AOx3, fluent speech, mentation intact, friend at bs    Head:    Normocephalic, without obvious abnormality, atraumatic   Eyes:    PERRL, conjunctiva/corneas clear, EOM's intact, both eyes   Ears:    Normal external ear canals, both ears   Nose:   Nares normal, septum midline, mucosa normal, no drainage    or sinus tenderness   Throat:   Lips, mucosa, and tongue normal   Neck:   Supple, symmetrical, trachea midline, no adenopathy;     thyroid:  no enlargement/tenderness/nodules; no JVD       Lungs:     Clear to auscultation bilaterally, respirations unlabored        Heart:    Regular rate and rhythm, S1 and S2 normal, no murmur, rub   or gallop   Abdomen:     Soft, non-tender, bowel sounds active all four quadrants,     no masses   Extremities:   Extremities normal, atraumatic, no cyanosis or edema   Pulses:   2+ and symmetric all extremities       Lymph nodes:   Cervical and supraclavicular nodes normal   Neurologic:   CNII-XII intact, moving all w/out focal deficit, neg FTN/HTS, did not evaluate gait given reported instability       .    Data Review:  Labs in chart were reviewed.             Imaging Results (all)     Procedure Component Value Units Date/Time    CT Angiogram Neck With & Without Contrast [[id number]] Updated:  18 1335    CT Angiogram Head With & Without Contrast [[id number]] Updated:  " 11/14/18 1335    CT Cerebral Perfusion With & Without Contrast [[id number]] Updated:  11/14/18 1335            Assessment:    TIA (transient ischemic attack)    Complicated migraine    Gait instability    HTN (hypertension)      Plan:  TIA vs Hypertensive encephalopathy vs Complicated/Atypical Migraine    -MRI w/ and w/o but no need for MRA as CTA completed    -ASA/statin for now    -PT eval for gait instability - hold OT/ST if passes bedside swallow as no complaints of dysphagia    -check A1c/FLP/TSH/Folate/b12    HTN - no previous Dx - start Norvasc in am tomorrow if MRI neg AND SBP remains >140    SCDs for DVT ppx     Further recommendations to follow as clinical course unfolds    Nael Fitch MD  11/14/2018  3:07 PM

## 2018-11-14 NOTE — ED NOTES
Pt c/o left sided headache with dizziness and nausea. Pt states that it started early this AM but worsened around 10:00 am.     Lilliana Modi RN  11/14/18 1235       Lilliana Modi RN  11/14/18 1232

## 2018-11-14 NOTE — CONSULTS
Neurology Consult Note    Consult Date: 11/14/2018    Referring MD: Nael Fitch MD    Reason for Consult I have been asked to see the patient in neurological consultation to render advice and opinion regarding ataxia, headache, possible stroke    Julissa Armenta is a 62 y.o. female with past medical history of migraine headache, thyroid disease, hypertension.  She developed left frontal and facial headache this morning around 9 AM associated with left posterior neck pain.  This gradually got worse until 11 AM the pain was very severe with some periorbital tenderness to palpation.  She did have associated nausea, photophobia as well as numbness on the left side of the face.  She noticed around this time that she suddenly became ataxic, and tripping over her feet when trying to walk.  She presented to our ER for medical attention.  I was called and asked for team D imaging for possible cerebellar stroke.  Since arrival her headache has significantly improved, however her left facial numbness and dizziness have persisted.  Patient also has a history of intermittent heart palpitations.    Past Medical/Surgical Hx:  Past Medical History:   Diagnosis Date   • Disease of thyroid gland      Past Surgical History:   Procedure Laterality Date   • COLONOSCOPY      2015   • FINGER SURGERY     • THYROID SURGERY         Medications On Admission  None    Allergies:  Allergies   Allergen Reactions   • Cephalexin Rash       Social Hx:  Social History     Socioeconomic History   • Marital status: Single     Spouse name: Not on file   • Number of children: Not on file   • Years of education: Not on file   • Highest education level: Not on file   Social Needs   • Financial resource strain: Not on file   • Food insecurity - worry: Not on file   • Food insecurity - inability: Not on file   • Transportation needs - medical: Not on file   • Transportation needs - non-medical: Not on file   Occupational History   • Not on file  "  Tobacco Use   • Smoking status: Never Smoker   • Smokeless tobacco: Never Used   Substance and Sexual Activity   • Alcohol use: No   • Drug use: No   • Sexual activity: Defer   Other Topics Concern   • Not on file   Social History Narrative   • Not on file       Family Hx:  Family History   Problem Relation Age of Onset   • Colon cancer Maternal Grandmother        Review of Systems  Constitutional: [No fevers, chills]  Eye: [+ recent visual problems, no eye discharge]  HEENT: [No ear pain, nasal congestion]  Respiratory: [No shortness of breath, cough]  Cardiovascular: [No Chest pain, + palpitations]  Gastrointestinal: [No nausea, vomiting]  Genitourinary: [No hematuria, incontinence]  Hema/Lymph: [no nosebleeds, history of anticoagulation]  Endocrine: [Negative for excessive urination, heat or cold intolerance]  Musculoskeletal: [No back pain, neck pain]  Integumentary: [No rash, pruritus]  Neurologic: [+ weakness, numbness]  Psychiatric: [No anxiety, depression]    Exam    /96   Pulse 62   Temp 97.3 °F (36.3 °C) (Tympanic)   Resp 18   Ht 162.6 cm (64\")   Wt 68.1 kg (150 lb 2.1 oz)   SpO2 100%   BMI 25.77 kg/m²   gen: NAD, vitals reviewed  Eyes: fundus sharp with no papilledema or retinal hemorrhages  CVS: RRR, S1, S2, no carotid bruits  MS: oriented x3, recent/remote memory intact, normal attention/concentration, language intact, no neglect, normal fund of knowledge  CN: visual acuity grossly normal, visual fields full, PERRL, EOMI, diminished left facial sensation to light touch and temperature, no facial droop, hearing symmetric, no dysarthria, shoulder shrug equal, tongue midline  Motor: 5/5 throughout upper and lower extremities, normal tone  Sensation: intact to vibration and temperature throughout  Reflexes: 2+ throughout upper and lower extremities, downgoing plantars  Coordination: no dysmetria with finger to nose bilaterally  Gait: Wide-based    DATA:    Lab Results   Component Value Date "    GLUCOSE 110 (H) 11/14/2018    CALCIUM 9.3 11/14/2018     11/14/2018    K 3.8 11/14/2018    CO2 24.1 11/14/2018     11/14/2018    BUN 18 11/14/2018    CREATININE 0.74 11/14/2018    EGFRIFAFRI 96 11/14/2018    EGFRIFNONA 69 05/14/2018    BCR 24.3 11/14/2018    ANIONGAP 10.9 11/14/2018     Lab Results   Component Value Date    WBC 4.64 11/14/2018    HGB 12.8 11/14/2018    HCT 39.0 11/14/2018    MCV 91.1 11/14/2018     11/14/2018     No results found for: CHOL  Lab Results   Component Value Date    HDL 84 (H) 05/14/2018    HDL 90 (H) 03/27/2017     Lab Results   Component Value Date     (H) 05/14/2018     (H) 03/27/2017     Lab Results   Component Value Date    TRIG 52 05/14/2018    TRIG 53 03/27/2017     Lab Results   Component Value Date    HGBA1C 5.80 (H) 03/27/2017     No results found for: INR, PROTIME    Lab review: CBC, BMP within normal limits    Imaging review: I personally reviewed her CT head, CTA/CTP.  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  CTA shows no significant stenosis in the extracranial or intracranial circulation, no vertebral dissection seen.  CTP was negative.  I discussed these images with Dr. Ramirez the reading neuroradiologist.    Discussed with Dr. Fitch plans to get MRI wo/w to characterize her hemorrhagic lesion and rule out cerebellar stroke.    Impression:  1) acute ataxia, improving  2) migraine headache with sensory aura, improving  3) abnormal head CT  4) hypertension    Comment: 62-year-old woman with migraine headache, no history of stroke or CAD, presents with acute onset ataxia in the setting of migraine headache.  This could be a symptom of her migraine however ataxia is not a common complaint associated with migraine.  CTA head and neck are reassuring.  Plan is to get MRI to exclude cerebellar stroke.  CT head did show a small hemorrhagic  lesion in the right parietal lobe.  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    PLAN:  1.  MRI brain with and without to exclude stroke and better characterize her hemorrhagic lesion  2.  2-D echo given report of intermittent heart palpitations and suspected arrhythmia

## 2018-11-14 NOTE — ED NOTES
Patient had a left sided headache with left sided facial numbness but no drooping. Patient states earlier she also felt off balance and dizzy.      Brooklyn Cornelius RN  11/14/18 6025

## 2018-11-14 NOTE — ED PROVIDER NOTES
EMERGENCY DEPARTMENT ENCOUNTER    Room number:  32/32  Date Seen:  11/14/2018  Time of transfer:1300  PCP:  Provider, No Known    Laboratory Results:  Recent Results (from the past 24 hour(s))   Light Blue Top    Collection Time: 11/14/18 12:39 PM   Result Value Ref Range    Extra Tube hold for add-on    Lavender Top    Collection Time: 11/14/18 12:39 PM   Result Value Ref Range    Extra Tube hold for add-on    Gold Top - SST    Collection Time: 11/14/18 12:39 PM   Result Value Ref Range    Extra Tube Hold for add-ons.    CBC Auto Differential    Collection Time: 11/14/18 12:39 PM   Result Value Ref Range    WBC 4.64 4.50 - 10.70 10*3/mm3    RBC 4.28 3.90 - 5.20 10*6/mm3    Hemoglobin 12.8 11.9 - 15.5 g/dL    Hematocrit 39.0 35.6 - 45.5 %    MCV 91.1 80.5 - 98.2 fL    MCH 29.9 26.9 - 32.0 pg    MCHC 32.8 32.4 - 36.3 g/dL    RDW 13.2 (H) 11.7 - 13.0 %    RDW-SD 44.2 37.0 - 54.0 fl    MPV 9.7 6.0 - 12.0 fL    Platelets 225 140 - 500 10*3/mm3    Neutrophil % 80.8 (H) 42.7 - 76.0 %    Lymphocyte % 14.9 (L) 19.6 - 45.3 %    Monocyte % 3.7 (L) 5.0 - 12.0 %    Eosinophil % 0.4 0.3 - 6.2 %    Basophil % 0.2 0.0 - 1.5 %    Immature Grans % 0.0 0.0 - 0.5 %    Neutrophils, Absolute 3.75 1.90 - 8.10 10*3/mm3    Lymphocytes, Absolute 0.69 (L) 0.90 - 4.80 10*3/mm3    Monocytes, Absolute 0.17 (L) 0.20 - 1.20 10*3/mm3    Eosinophils, Absolute 0.02 0.00 - 0.70 10*3/mm3    Basophils, Absolute 0.01 0.00 - 0.20 10*3/mm3    Immature Grans, Absolute 0.00 0.00 - 0.03 10*3/mm3   Green Top (Gel)    Collection Time: 11/14/18 12:40 PM   Result Value Ref Range    Extra Tube Hold for add-ons.    Comprehensive Metabolic Panel    Collection Time: 11/14/18 12:40 PM   Result Value Ref Range    Glucose 110 (H) 65 - 99 mg/dL    BUN 18 8 - 23 mg/dL    Creatinine 0.74 0.57 - 1.00 mg/dL    Sodium 142 136 - 145 mmol/L    Potassium 3.8 3.5 - 5.2 mmol/L    Chloride 107 98 - 107 mmol/L    CO2 24.1 22.0 - 29.0 mmol/L    Calcium 9.3 8.6 - 10.5 mg/dL    Total  Protein 7.3 6.0 - 8.5 g/dL    Albumin 4.20 3.50 - 5.20 g/dL    ALT (SGPT) 19 1 - 33 U/L    AST (SGOT) 24 1 - 32 U/L    Alkaline Phosphatase 71 39 - 117 U/L    Total Bilirubin 0.4 0.1 - 1.2 mg/dL    eGFR  African Amer 96 >60 mL/min/1.73    Globulin 3.1 gm/dL    A/G Ratio 1.4 g/dL    BUN/Creatinine Ratio 24.3 7.0 - 25.0    Anion Gap 10.9 mmol/L   Troponin    Collection Time: 11/14/18 12:40 PM   Result Value Ref Range    Troponin T <0.010 0.000 - 0.030 ng/mL   POC Glucose Once    Collection Time: 11/14/18  1:05 PM   Result Value Ref Range    Glucose 98 70 - 130 mg/dL     I reviewed the above results.    Radiology:  CT Angiogram Neck With & Without Contrast    (Results Pending)   CT Angiogram Head With & Without Contrast    (Results Pending)   CT Cerebral Perfusion With & Without Contrast    (Results Pending)   Reviewed CTP and CTA head and neck which show nothing acute. Independently viewed by me. Interpreted by radiologist. Discussed with Dr. Cope (neuro).    I reviewed the above results    Medications ordered in ED:  Medications   sodium chloride 0.9 % flush 10 mL (not administered)   iopamidol (ISOVUE-370) 76 % injection 150 mL (150 mL Intravenous Given by Other 11/14/18 3474)       Physical Exam:  GENERAL: not distressed  HENT: nares patent  EYES: no scleral icterus  CV: regular rhythm, regular rate  RESPIRATORY: normal effort  ABDOMEN: soft  MUSCULOSKELETAL: no deformity  NEURO: alert and oriented x3, DIAZ, slight drift in the left leg, minimal decreased sensation on the left side,   SKIN: warm, dry    Vital signs and nursing notes reviewed.    Procedures:  Interval: baseline  1a. Level of Consciousness: 0-->Alert, keenly responsive  1b. LOC Questions: 0-->Answers both questions correctly  1c. LOC Commands: 0-->Performs both tasks correctly  2. Best Gaze: 0-->Normal  3. Visual: 0-->No visual loss  4. Facial Palsy: 0-->Normal symmetrical movements  5a. Motor Arm, Left: 0-->No drift, limb holds 90 (or 45) degrees  for full 10 secs  5b. Motor Arm, Right: 0-->No drift, limb holds 90 (or 45) degrees for full 10 secs  6a. Motor Leg, Left: 1-->Drift, leg falls by the end of the 5-sec period but does not hit bed  6b. Motor Leg, Right: 0-->No drift, leg holds 30 degree position for full 5 secs  7. Limb Ataxia: 0-->Absent  8. Sensory: 1-->Mild-to-moderate sensory loss, patient feels pinprick is less sharp or is dull on the affected side, or there is a loss of superficial pain with pinprick, but patient is aware of being touched  9. Best Language: 0-->No aphasia, normal  10. Dysarthria: 0-->Normal  11. Extinction and Inattention (formerly Neglect): 0-->No abnormality    Total (NIH Stroke Scale): 2    EKG          EKG time: 1401  Rhythm/Rate: SR, 65  P waves and GA: LAE  QRS, axis: LAD, LVH    ST and T waves: inferior T wave inversion     Interpreted Contemporaneously by me, independently viewed  changed compared to prior 4/12/12    Progress and Consult Notes:  1300    Patient care transferred from Gloria Metcalf.    1305  Pt presents with a HA that began 0630 this morning. She has also developed decreased sensation on the left side of her face and dizziness that began at 1100. She took two advil for her HA with minimal relief. Pt states that she has a h/x of migraines but has not been officially diagnosed. She has had complex migraine sympotms in the past. Gloria Metcalf PA-C spoke to Dr. Cope (neuro) and he would like to order a CTP and CTA head for further evaluation. At presentation, Pt's BP is 193/110.        1352  Dr. Cope in the ER for consult. He would like to admit Pt for further evaluation due to her symptoms and arrhythmia's noted on the monitor. Her CTP and CTA head and neck were negative, however he would still like her to be admitted. I will place call for admission.    1357  Placed call to [facility] for admission.     1415  Rechecked Pt and informed her that she will be admitted for further evaluation and treatment. Her scans  were negative but Dr. Cope would like to admit her for additional workup. Pt understands and agrees to all plans. All questions answered.     1443  Discussed Pt's case with Dr. Fitch ([facility]) who agrees to admit Pt to telemetry for further evaluation and treatment.       Diagnosis:  Final diagnoses:   Headache syndrome, complicated   Left sided numbness   Hypertension, unspecified type       ADMISSION    Discussed treatment plan and reason for admission with pt/family and admitting physician.  Pt/family voiced understanding of the plan for admission for further testing/treatment as needed.       Provider attestation:  I personally reviewed the past medical history, past surgical history, social history, family history, current medications, and allergies as they appear in the chart.            Dana Ashton  11/14/18 1446       Mohan Whitfield MD  11/14/18 1145

## 2018-11-14 NOTE — ED PROVIDER NOTES
"EMERGENCY DEPARTMENT ENCOUNTER    Room Number:  05/05  Date seen:  11/14/2018  Time seen: 12:52 PM  PCP: Provider, No Known    HPI:  Chief complaint: headache  Context:Julissa Armenta is a 62 y.o. female who presents to the ED with c/o headache and dizziness. She states she started with a headache x around 6:45 this morning which gradually progressed to severe around 10am. It is now improved and mild, but around 11am started feeling nauseated and dizzy. She describes the dizziness as feeling \"clumsy, tripping over myself.\" She reports photophobia and phonophobia, denies chest pain and shortness of breath as well as nausea and vomiting. No formal diagnosis of migraines or headaches and she dneies having any previous brain imaging.     Onset: gradual  Location: head  Radiation: none  Duration: x this am  Timing: constant  Character:pressure  Aggravating Factors: light/sound  Alleviating Factors: none  Severity: mild currently      ALLERGIES  Cephalexin    PAST MEDICAL HISTORY  Active Ambulatory Problems     Diagnosis Date Noted   • Menopause 05/14/2018     Resolved Ambulatory Problems     Diagnosis Date Noted   • No Resolved Ambulatory Problems     Past Medical History:   Diagnosis Date   • Disease of thyroid gland        PAST SURGICAL HISTORY  Past Surgical History:   Procedure Laterality Date   • COLONOSCOPY      2015   • FINGER SURGERY     • THYROID SURGERY         FAMILY HISTORY  Family History   Problem Relation Age of Onset   • Colon cancer Maternal Grandmother        SOCIAL HISTORY  Social History     Socioeconomic History   • Marital status: Single     Spouse name: Not on file   • Number of children: Not on file   • Years of education: Not on file   • Highest education level: Not on file   Social Needs   • Financial resource strain: Not on file   • Food insecurity - worry: Not on file   • Food insecurity - inability: Not on file   • Transportation needs - medical: Not on file   • Transportation needs - " non-medical: Not on file   Occupational History   • Not on file   Tobacco Use   • Smoking status: Never Smoker   • Smokeless tobacco: Never Used   Substance and Sexual Activity   • Alcohol use: No   • Drug use: No   • Sexual activity: Defer   Other Topics Concern   • Not on file   Social History Narrative   • Not on file       REVIEW OF SYSTEMS  Review of Systems   Constitutional: Negative for chills and fever.   HENT:        +phonophobia   Eyes: Positive for photophobia.   Respiratory: Negative for shortness of breath.    Cardiovascular: Negative for chest pain.   Gastrointestinal: Negative for abdominal pain, nausea and vomiting.   Genitourinary: Negative.    Musculoskeletal: Negative.    Skin: Negative.    Neurological: Positive for dizziness and headaches.   Psychiatric/Behavioral: Negative.        PHYSICAL EXAM  ED Triage Vitals   Temp Heart Rate Resp BP SpO2   11/14/18 1225 11/14/18 1225 11/14/18 1225 11/14/18 1225 11/14/18 1225   97.3 °F (36.3 °C) 75 18 172/90 97 %      Temp src Heart Rate Source Patient Position BP Location FiO2 (%)   11/14/18 1225 11/14/18 1237 -- 11/14/18 1237 --   Tympanic Monitor  Right arm      Physical Exam   Constitutional: She is oriented to person, place, and time and well-developed, well-nourished, and in no distress.   HENT:   Head: Normocephalic and atraumatic.   Right Ear: External ear normal.   Left Ear: External ear normal.   Nose: Nose normal.   Eyes: Conjunctivae are normal.   Neck: Normal range of motion.   Cardiovascular: Normal rate and regular rhythm.   Pulmonary/Chest: Effort normal and breath sounds normal.   Musculoskeletal: Normal range of motion.   Neurological: She is alert and oriented to person, place, and time.   GCS is 15  Cranial nerves II-XII are intact.  No facial droop. Uvula is midline. No tongue deviation. PERRL and EOMI. There is no dysarthria, aphasia or slurred speech.  Decreased sensation in the left face in V1, V2, and V3. Symmetrical sensation to  light touch intact in the BUE and BLE.  Strength is 5/5 and symmetrical in the BUE and BLE.  Finger to nose, heel to shin, and rapid alternating movements are intact.  Gait is unsteady     Skin: Skin is warm and dry.   Psychiatric: Affect normal.   Nursing note and vitals reviewed.      LAB RESULTS  Labs Reviewed   COMPREHENSIVE METABOLIC PANEL - Abnormal; Notable for the following components:       Result Value    Glucose 110 (*)     All other components within normal limits   CBC WITH AUTO DIFFERENTIAL - Abnormal; Notable for the following components:    RDW 13.2 (*)     Neutrophil % 80.8 (*)     Lymphocyte % 14.9 (*)     Monocyte % 3.7 (*)     Lymphocytes, Absolute 0.69 (*)     Monocytes, Absolute 0.17 (*)     All other components within normal limits   LIPID PANEL - Abnormal; Notable for the following components:    HDL Cholesterol 75 (*)     All other components within normal limits    Narrative:     Cholesterol Reference Ranges  (U.S. Department of Health and Human Services ATP III Classifications)    Desirable          <200 mg/dL  Borderline High    200-239 mg/dL  High Risk          >240 mg/dL      Triglyceride Reference Ranges  (U.S. Department of Health and Human Services ATP III Classifications)    Normal           <150 mg/dL  Borderline High  150-199 mg/dL  High             200-499 mg/dL  Very High        >500 mg/dL    HDL Reference Ranges  (U.S. Department of Health and Human Services ATP III Classifcations)    Low     <40 mg/dl (major risk factor for CHD)  High    >60 mg/dl ('negative' risk factor for CHD)        LDL Reference Ranges  (U.S. Department of Health and Human Services ATP III Classifcations)    Optimal          <100 mg/dL  Near Optimal     100-129 mg/dL  Borderline High  130-159 mg/dL  High             160-189 mg/dL  Very High        >189 mg/dL   TROPONIN (IN-HOUSE) - Normal    Narrative:     Troponin T Reference Ranges:  Less than 0.03 ng/mL:    Negative for AMI  0.03 to 0.09 ng/mL:       Indeterminant for AMI  Greater than 0.09 ng/mL: Positive for AMI   HEMOGLOBIN A1C - Normal    Narrative:     Hemoglobin A1C Ranges:    Increased Risk for Diabetes  5.7% to 6.4%  Diabetes                     >= 6.5%  Diabetic Goal                < 7.0%   TSH - Normal   FOLATE - Normal   VITAMIN B12 - Normal   POCT GLUCOSE FINGERSTICK - Normal   POCT GLUCOSE FINGERSTICK - Normal   POCT GLUCOSE FINGERSTICK - Normal   RAINBOW DRAW    Narrative:     The following orders were created for panel order [place] Draw.  Procedure                               Abnormality         Status                     ---------                               -----------         ------                     Light Blue Top[[id number]]                                   Final result               Green Top (Gel)[[id number]]                                  Final result               Lavender Top[[id number]]                                     Final result               Gold Top - SST[[id number]]                                   Final result                 Please view results for these tests on the individual orders.   POCT GLUCOSE FINGERSTICK   POCT GLUCOSE FINGERSTICK   POCT GLUCOSE FINGERSTICK   LIGHT BLUE TOP   GREEN TOP   LAVENDER TOP   GOLD TOP - SST   CBC AND DIFFERENTIAL    Narrative:     The following orders were created for panel order CBC & Differential.  Procedure                               Abnormality         Status                     ---------                               -----------         ------                     CBC Auto Differential[[id number]]        Abnormal            Final result                 Please view results for these tests on the individual orders.       I ordered the above labs and reviewed the results    RADIOLOGY  CT Head Without Contrast    (Results Pending)       I ordered the above noted radiological studies and reviewed the images on the PACS system.      MEDICATIONS GIVEN IN ER  Medications   sodium  chloride 0.9 % flush 10 mL (not administered)   prochlorperazine (COMPAZINE) injection 10 mg (not administered)   ketorolac (TORADOL) injection 15 mg (not administered)       EKG  Interpreted by ED Physician    PROCEDURES  Procedures      PROGRESS AND CONSULTS    Progress Notes:    ED Course as of Nov 14 1304   Wed Nov 14, 2018   1303 Discussed with Dr. Cope of the stroke team. He recommends TEAM D, CTA head and neck and CT perfusion and will meet the patient in CT.  [KA]      ED Course User Index  [KA] Debra Metcalf PA         1309 Reviewed pt's history and workup with Dr. Whitfield who is going now to perform a bedside evaluation and will take over course of care at this time.         Debra Metcalf PA  11/16/18 [id number]

## 2018-11-15 ENCOUNTER — APPOINTMENT (OUTPATIENT)
Dept: CARDIOLOGY | Facility: HOSPITAL | Age: 63
End: 2018-11-15
Attending: PSYCHIATRY & NEUROLOGY

## 2018-11-15 VITALS
HEIGHT: 64 IN | WEIGHT: 145 LBS | RESPIRATION RATE: 18 BRPM | OXYGEN SATURATION: 99 % | BODY MASS INDEX: 24.75 KG/M2 | HEART RATE: 62 BPM | SYSTOLIC BLOOD PRESSURE: 133 MMHG | DIASTOLIC BLOOD PRESSURE: 73 MMHG | TEMPERATURE: 98.1 F

## 2018-11-15 PROBLEM — I10 HYPERTENSIVE DISORDER: Status: ACTIVE | Noted: 2018-11-14

## 2018-11-15 PROBLEM — R26.81 GAIT INSTABILITY: Status: RESOLVED | Noted: 2018-11-14 | Resolved: 2018-11-15

## 2018-11-15 LAB
AORTIC DIMENSIONLESS INDEX: 0.8 (DI)
BH CV ECHO MEAS - ACS: 1.9 CM
BH CV ECHO MEAS - AO MEAN PG (FULL): 3 MMHG
BH CV ECHO MEAS - AO MEAN PG: 7 MMHG
BH CV ECHO MEAS - AO ROOT AREA (BSA CORRECTED): 1.6
BH CV ECHO MEAS - AO ROOT AREA: 6.2 CM^2
BH CV ECHO MEAS - AO ROOT DIAM: 2.8 CM
BH CV ECHO MEAS - AO V2 MEAN: 123 CM/SEC
BH CV ECHO MEAS - AO V2 VTI: 37.2 CM
BH CV ECHO MEAS - ASC AORTA: 3.3 CM
BH CV ECHO MEAS - AVA(I,A): 2.3 CM^2
BH CV ECHO MEAS - AVA(I,D): 2.3 CM^2
BH CV ECHO MEAS - BSA(HAYCOCK): 1.7 M^2
BH CV ECHO MEAS - BSA: 1.7 M^2
BH CV ECHO MEAS - BZI_BMI: 24.9 KILOGRAMS/M^2
BH CV ECHO MEAS - BZI_METRIC_HEIGHT: 162.6 CM
BH CV ECHO MEAS - BZI_METRIC_WEIGHT: 65.8 KG
BH CV ECHO MEAS - EDV(CUBED): 59.3 ML
BH CV ECHO MEAS - EDV(MOD-SP2): 49 ML
BH CV ECHO MEAS - EDV(MOD-SP4): 57 ML
BH CV ECHO MEAS - EDV(TEICH): 65.9 ML
BH CV ECHO MEAS - EF(CUBED): 76.7 %
BH CV ECHO MEAS - EF(MOD-BP): 63 %
BH CV ECHO MEAS - EF(MOD-SP2): 65.3 %
BH CV ECHO MEAS - EF(MOD-SP4): 61.4 %
BH CV ECHO MEAS - EF(TEICH): 69.4 %
BH CV ECHO MEAS - ESV(CUBED): 13.8 ML
BH CV ECHO MEAS - ESV(MOD-SP2): 17 ML
BH CV ECHO MEAS - ESV(MOD-SP4): 22 ML
BH CV ECHO MEAS - ESV(TEICH): 20.2 ML
BH CV ECHO MEAS - FS: 38.5 %
BH CV ECHO MEAS - IVS/LVPW: 1
BH CV ECHO MEAS - IVSD: 1.1 CM
BH CV ECHO MEAS - LAT PEAK E' VEL: 10 CM/SEC
BH CV ECHO MEAS - LV DIASTOLIC VOL/BSA (35-75): 33.4 ML/M^2
BH CV ECHO MEAS - LV MASS(C)D: 140.1 GRAMS
BH CV ECHO MEAS - LV MASS(C)DI: 82.1 GRAMS/M^2
BH CV ECHO MEAS - LV MEAN PG: 4 MMHG
BH CV ECHO MEAS - LV SYSTOLIC VOL/BSA (12-30): 12.9 ML/M^2
BH CV ECHO MEAS - LV V1 MEAN: 92.3 CM/SEC
BH CV ECHO MEAS - LV V1 VTI: 29.8 CM
BH CV ECHO MEAS - LVIDD: 3.9 CM
BH CV ECHO MEAS - LVIDS: 2.4 CM
BH CV ECHO MEAS - LVLD AP2: 6.7 CM
BH CV ECHO MEAS - LVLD AP4: 7 CM
BH CV ECHO MEAS - LVLS AP2: 5.8 CM
BH CV ECHO MEAS - LVLS AP4: 5.4 CM
BH CV ECHO MEAS - LVOT AREA (M): 2.8 CM^2
BH CV ECHO MEAS - LVOT AREA: 2.8 CM^2
BH CV ECHO MEAS - LVOT DIAM: 1.9 CM
BH CV ECHO MEAS - LVPWD: 1.1 CM
BH CV ECHO MEAS - MED PEAK E' VEL: 8 CM/SEC
BH CV ECHO MEAS - MV A DUR: 0.17 SEC
BH CV ECHO MEAS - MV A MAX VEL: 91.8 CM/SEC
BH CV ECHO MEAS - MV DEC SLOPE: 283 CM/SEC^2
BH CV ECHO MEAS - MV DEC TIME: 0.23 SEC
BH CV ECHO MEAS - MV E MAX VEL: 82.9 CM/SEC
BH CV ECHO MEAS - MV E/A: 0.9
BH CV ECHO MEAS - MV MEAN PG: 2 MMHG
BH CV ECHO MEAS - MV P1/2T MAX VEL: 96.4 CM/SEC
BH CV ECHO MEAS - MV P1/2T: 99.8 MSEC
BH CV ECHO MEAS - MV V2 MEAN: 64.8 CM/SEC
BH CV ECHO MEAS - MV V2 VTI: 38.6 CM
BH CV ECHO MEAS - MVA P1/2T LCG: 2.3 CM^2
BH CV ECHO MEAS - MVA(P1/2T): 2.2 CM^2
BH CV ECHO MEAS - MVA(VTI): 2.2 CM^2
BH CV ECHO MEAS - PA ACC SLOPE: 10.7 CM/SEC^2
BH CV ECHO MEAS - PA ACC TIME: 0.13 SEC
BH CV ECHO MEAS - PA MAX PG (FULL): 2.1 MMHG
BH CV ECHO MEAS - PA MAX PG: 4.2 MMHG
BH CV ECHO MEAS - PA PR(ACCEL): 21.9 MMHG
BH CV ECHO MEAS - PA V2 MAX: 102 CM/SEC
BH CV ECHO MEAS - PULM A REVS DUR: 0.09 SEC
BH CV ECHO MEAS - PULM A REVS VEL: 37 CM/SEC
BH CV ECHO MEAS - PULM DIAS VEL: 50.8 CM/SEC
BH CV ECHO MEAS - PULM S/D: 1.5
BH CV ECHO MEAS - PULM SYS VEL: 75 CM/SEC
BH CV ECHO MEAS - PVA(V,A): 2 CM^2
BH CV ECHO MEAS - PVA(V,D): 2 CM^2
BH CV ECHO MEAS - QP/QS: 0.52
BH CV ECHO MEAS - RAP SYSTOLE: 3 MMHG
BH CV ECHO MEAS - RV MAX PG: 2.1 MMHG
BH CV ECHO MEAS - RV MEAN PG: 1 MMHG
BH CV ECHO MEAS - RV V1 MAX: 71.6 CM/SEC
BH CV ECHO MEAS - RV V1 MEAN: 50.8 CM/SEC
BH CV ECHO MEAS - RV V1 VTI: 15.4 CM
BH CV ECHO MEAS - RVOT AREA: 2.8 CM^2
BH CV ECHO MEAS - RVOT DIAM: 1.9 CM
BH CV ECHO MEAS - RVSP: 21 MMHG
BH CV ECHO MEAS - SI(AO): 134.3 ML/M^2
BH CV ECHO MEAS - SI(CUBED): 26.7 ML/M^2
BH CV ECHO MEAS - SI(LVOT): 49.5 ML/M^2
BH CV ECHO MEAS - SI(MOD-SP2): 18.8 ML/M^2
BH CV ECHO MEAS - SI(MOD-SP4): 20.5 ML/M^2
BH CV ECHO MEAS - SI(TEICH): 26.8 ML/M^2
BH CV ECHO MEAS - SV(AO): 229.1 ML
BH CV ECHO MEAS - SV(CUBED): 45.5 ML
BH CV ECHO MEAS - SV(LVOT): 84.5 ML
BH CV ECHO MEAS - SV(MOD-SP2): 32 ML
BH CV ECHO MEAS - SV(MOD-SP4): 35 ML
BH CV ECHO MEAS - SV(RVOT): 43.7 ML
BH CV ECHO MEAS - SV(TEICH): 45.8 ML
BH CV ECHO MEAS - TAPSE (>1.6): 2.4 CM2
BH CV ECHO MEAS - TR MAX VEL: 213 CM/SEC
BH CV ECHO MEASUREMENTS AVERAGE E/E' RATIO: 9.21
BH CV VAS BP RIGHT ARM: NORMAL MMHG
BH CV XLRA - RV BASE: 3.5 CM
BH CV XLRA - TDI S': 11.8 CM/SEC
CHOLEST SERPL-MCNC: 183 MG/DL (ref 0–200)
FOLATE SERPL-MCNC: 5.91 NG/ML (ref 4.78–24.2)
GLUCOSE BLDC GLUCOMTR-MCNC: 100 MG/DL (ref 70–130)
HBA1C MFR BLD: 5.6 % (ref 4.8–5.6)
HDLC SERPL-MCNC: 75 MG/DL (ref 40–60)
LDLC SERPL CALC-MCNC: 99 MG/DL (ref 0–100)
LDLC/HDLC SERPL: 1.32 {RATIO}
LEFT ATRIUM VOLUME INDEX: 18.1 ML/M2
LV EF 2D ECHO EST: 63 %
MAXIMAL PREDICTED HEART RATE: 156 BPM
STRESS TARGET HR: 133 BPM
TRIGL SERPL-MCNC: 44 MG/DL (ref 0–150)
TSH SERPL DL<=0.05 MIU/L-ACNC: 1.18 MIU/ML (ref 0.27–4.2)
VIT B12 BLD-MCNC: 352 PG/ML (ref 211–946)
VLDLC SERPL-MCNC: 8.8 MG/DL (ref 5–40)

## 2018-11-15 PROCEDURE — 93306 TTE W/DOPPLER COMPLETE: CPT

## 2018-11-15 PROCEDURE — 84443 ASSAY THYROID STIM HORMONE: CPT | Performed by: HOSPITALIST

## 2018-11-15 PROCEDURE — 36415 COLL VENOUS BLD VENIPUNCTURE: CPT | Performed by: PSYCHIATRY & NEUROLOGY

## 2018-11-15 PROCEDURE — 82746 ASSAY OF FOLIC ACID SERUM: CPT | Performed by: HOSPITALIST

## 2018-11-15 PROCEDURE — 80061 LIPID PANEL: CPT | Performed by: PSYCHIATRY & NEUROLOGY

## 2018-11-15 PROCEDURE — 99214 OFFICE O/P EST MOD 30 MIN: CPT | Performed by: NURSE PRACTITIONER

## 2018-11-15 PROCEDURE — 93306 TTE W/DOPPLER COMPLETE: CPT | Performed by: INTERNAL MEDICINE

## 2018-11-15 PROCEDURE — 83036 HEMOGLOBIN GLYCOSYLATED A1C: CPT | Performed by: PSYCHIATRY & NEUROLOGY

## 2018-11-15 PROCEDURE — 82607 VITAMIN B-12: CPT | Performed by: HOSPITALIST

## 2018-11-15 PROCEDURE — G0378 HOSPITAL OBSERVATION PER HR: HCPCS

## 2018-11-15 PROCEDURE — 82962 GLUCOSE BLOOD TEST: CPT

## 2018-11-15 RX ORDER — ASPIRIN 81 MG/1
81 TABLET ORAL DAILY
Start: 2018-11-15

## 2018-11-15 RX ORDER — AMLODIPINE BESYLATE 5 MG/1
5 TABLET ORAL
Start: 2018-11-16 | End: 2018-12-19 | Stop reason: ALTCHOICE

## 2018-11-15 RX ADMIN — Medication 3 ML: at 08:49

## 2018-11-15 RX ADMIN — AMLODIPINE BESYLATE 5 MG: 5 TABLET ORAL at 08:46

## 2018-11-15 RX ADMIN — Medication 3 ML: at 08:48

## 2018-11-15 RX ADMIN — ASPIRIN 325 MG: 325 TABLET ORAL at 08:46

## 2018-11-15 NOTE — CONSULTS
Patient Name: Julissa Armenta  :1955  62 y.o.    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  Patient presented to the emergency room complaining of a severe headache and dizziness that started at 6:45 AM but progressively worsened.  Patient took 2 Advil but it persisted she began feeling nauseated and dizzy and had some left-sided numbness.  Patient ultimately was found to be very hypertensive.  Workup per neurology was believed to be a hypertensive headache causing a migraine.  Patient's blood pressure is dramatically improved she still has a headache but is significantly improved and she looks very comfortable.  We were asked to see patient for an abnormal EKG.  Her troponin so far has been negative.    Troponin negative ×1     CTA head neck and CT perfusion     FINDINGS: The brain ventricles are symmetrical. There is a 7 mm area of  increased attenuation involving the subcortical white matter in the right frontoparietal region superiolaterally. No other areas of increased attenuation are noted. Mild vascular calcification is appreciated.    CT PERFUSION: CT perfusion shows no evidence of abnormal perfusion.    CTA MPRESSION:  No evidence of acute infarction. The noncontrasted CT examination demonstrates a 7 mm area of increased attenuation involving the subcortical white matter of the frontoparietal region superiolaterally with a suggestion of minimal enhancement. There is no evidence of surrounding edema. The appearance is nonspecific. This may represent an underlying vascular lesion such as a  cavernous malformation/area of capillary telangiectasia. However, underlying malignancy including hemorrhagic metastatic disease cannot be excluded.  Further evaluation with a MRI examination of the brain with and without contrast is recommended.      Past Medical History:   Diagnosis Date   • Disease of thyroid gland        Past Surgical History:   Procedure Laterality Date   • COLONOSCOPY      2015   • FINGER SURGERY     • THYROID SURGERY           Prior to Admission medications    Medication Sig Start Date End Date Taking? Authorizing Provider   alendronate (FOSAMAX) 70 MG/75ML solution Take 75 mL by mouth Every 7 (Seven) Days. 3/27/17 11/14/18  Ruel Duran MD       Allergies   Allergen Reactions   • Cephalexin Rash       Social History     Socioeconomic History   • Marital status: Single     Spouse name: Not on file   • Number of children: Not on file   • Years of education: Not on file   • Highest education level: Not on file   Tobacco Use   • Smoking status: Never Smoker   • Smokeless tobacco: Never Used   Substance and Sexual Activity   • Alcohol use: No   • Drug use: No   • Sexual activity: Defer       Family History   Problem Relation Age of Onset   • Colon cancer Maternal Grandmother        REVIEW OF SYSTEMS:   All systems reviewed.  Pertinent positives identified in HPI.  All other systems are negative.      Objective:     Vitals:    11/14/18 1500 11/14/18 1534 11/14/18 1610 11/14/18 1900   BP:  164/83 159/83 142/80   BP Location:   Left arm Left arm   Patient Position:   Lying Lying   Pulse: 62 60 65 68   Resp:  14 18 18   Temp:   98 °F (36.7 °C) 98.8 °F (37.1 °C)   TempSrc:   Oral Oral   SpO2: 100% 100% 100% 98%   Weight:       Height:         Body mass index is 25.77 kg/m².    General Appearance:    Alert, cooperative, in no acute distress   Head:    Normocephalic, without obvious abnormality, atraumatic   Eyes:            Lids and lashes normal, conjunctivae and sclerae normal, no   icterus,  no pallor, corneas clear, PERRLA   Ears:    Ears appear intact with no abnormalities noted   Throat:   No oral lesions, no thrush, oral mucosa moist   Neck:   No adenopathy, supple, trachea midline, no thyromegaly, no   carotid bruit, no JVD   Back:     No kyphosis present, no scoliosis present, no skin lesions, erythema or scars, no tenderness to percussion or palpation, range of motion normal   Lungs:     Clear to auscultation,respirations regular, even and unlabored    Heart:    Regular rhythm and normal rate, normal S1 and S2, no murmur, no gallop, no rub, no click   Chest Wall:    No abnormalities observed   Abdomen:     Normal bowel sounds, no masses, no organomegaly, soft        non-tender, non-distended, no guarding, no rebound  tenderness   Extremities:   Moves all extremities well, no edema, no cyanosis, no redness   Pulses:   Pulses palpable and equal bilaterally. Normal radial, carotid, femoral, dorsalis pedis and posterior tibial pulses bilaterally. Normal abdominal aorta   Skin:  Psychiatric:   No bleeding, bruising or rash    Alert and oriented x 3, normal mood and affect   Lab Review:     Results from last 7 days   Lab Units  11/14/18   1240   SODIUM mmol/L  142   POTASSIUM mmol/L  3.8   CHLORIDE mmol/L  107   CO2 mmol/L  24.1   BUN mg/dL  18   CREATININE mg/dL  0.74   CALCIUM mg/dL  9.3   BILIRUBIN mg/dL  0.4   ALK PHOS U/L  71   ALT (SGPT) U/L  19   AST (SGOT) U/L  24   GLUCOSE mg/dL  110*     Results from last 7 days   Lab Units  11/14/18   1240   TROPONIN T ng/mL  <0.010     Results from last 7 days   Lab Units  11/14/18   1239   WBC 10*3/mm3  4.64   HEMOGLOBIN g/dL  12.8   HEMATOCRIT %  39.0   PLATELETS 10*3/mm3  225                         11/14/2018    I personally viewed and interpreted the patient's EKG/Telemetry data.        Assessment and Plan:   1.  Hypertensive emergency.  Patient is better blood pressure significantly better.  In light of that I agree with current management she's given  be started on amlodipine.  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  EKG as noted above patient does have and LAFB and T-wave inversions is in the direction of the QRS complex and probably just represents hypertension.  I do agree with echocardiogram to make sure there is no wall motion abnormalities I would have her follow-up with my nurse practitioner BETH LLOYD in one week and myself in 4 weeks.  Patient is also in the process of establishing a primary care physician however with a hypertensive emergency we will manage initially.   2.  Okay to discharge if echo is unremarkable broke with Dr. Little Richardson RN  11/14/18  7:24 PM

## 2018-11-15 NOTE — PROGRESS NOTES
"DOS: 11/15/2018  NAME: Julissa Armenta  : 1955  PCP: Not known  CC: headache, left facial numbness, ataxia    Stroke    Subjective: Pt seen in follow up, however new to the examiner. Pt lying in bed resting comfortably, states she still has a little bit of a headache today but it isn't that bad.  Denies c/o numbness, or dizziness.    Objective:  Vital signs:   Vitals:    18 2300 11/15/18 0500 11/15/18 0655 11/15/18 1305   BP: 121/77  133/73 133/73   BP Location: Left arm  Right arm    Patient Position: Lying  Lying    Pulse: 59  62 62   Resp: 18  18    Temp: 98 °F (36.7 °C)  98.1 °F (36.7 °C)    TempSrc: Oral  Oral    SpO2: 99%  99%    Weight:  65.9 kg (145 lb 4.8 oz)  65.8 kg (145 lb)   Height:    162.6 cm (64\")     Current Facility-Administered Medications:   •  acetaminophen (TYLENOL) tablet 650 mg, 650 mg, Oral, Q6H PRN, Nael Fitch MD  •  amLODIPine (NORVASC) tablet 5 mg, 5 mg, Oral, Q24H, Nael Fitch MD, 5 mg at 11/15/18 0846  •  aspirin tablet 325 mg, 325 mg, Oral, Daily **OR** aspirin suppository 300 mg, 300 mg, Rectal, Daily, Nael Fitch MD  •  aspirin tablet 325 mg, 325 mg, Oral, Daily, Nael Fitch MD, 325 mg at 11/15/18 0846  •  atorvastatin (LIPITOR) tablet 80 mg, 80 mg, Oral, Nightly, Judah Cope MD  •  atorvastatin (LIPITOR) tablet 80 mg, 80 mg, Oral, Nightly, Nael Fitch MD, 80 mg at 18  •  ondansetron (ZOFRAN) tablet 4 mg, 4 mg, Oral, Q6H PRN **OR** ondansetron ODT (ZOFRAN-ODT) disintegrating tablet 4 mg, 4 mg, Oral, Q6H PRN **OR** ondansetron (ZOFRAN) injection 4 mg, 4 mg, Intravenous, Q6H PRN, Nael Fitch MD  •  sodium chloride 0.9 % flush 10 mL, 10 mL, Intravenous, PRN, Mohan Whitfield MD  •  sodium chloride 0.9 % flush 3 mL, 3 mL, Intravenous, Q12H, Judah Cope MD, 3 mL at 11/15/18 0849  •  sodium chloride 0.9 % flush 3 mL, 3 mL, Intravenous, Q12H, Nael Fitch MD, 3 mL at " 11/15/18 0848  •  sodium chloride 0.9 % flush 3-10 mL, 3-10 mL, Intravenous, PRN, Judah Cope MD  •  sodium chloride 0.9 % flush 3-10 mL, 3-10 mL, Intravenous, PRN, Nael Fitch MD   PRN MEDS  •  acetaminophen  •  ondansetron **OR** ondansetron ODT **OR** ondansetron  •  sodium chloride  •  sodium chloride  •  sodium chloride     Normal Exam:  General appearance: Well developed, well nourished, well groomed, alert and cooperative resting comfortably in bed  HEENT: Normocephalic, PERRL, no masses or tenderness  Neck and spine: Normal range of motion. Normal alignment. No mass or tenderness.    Cardiac: Regular rate and irregular rhythm.  Peripheral Vasculature: Peripheral pulses are equal and symmetric.  Chest Exam: Clear to auscultation bilaterally, no wheezes, no rhonchi.  Extremities: Normal, no edema.   Skin: No rashes or birthmarks.     Higher integrative function: Oriented to time, place, person, intact recent and remote memory, attention span, concentration and language. Spontaneous speech, fund of vocabulary are normal.   CN II: Normal visual acuity and visual fields.   CN III IV VI: Extraocular movements are full without nystagmus. Pupils are equal, round, and reactive to light. No relative afferent pupillary defect. No internuclear ophthalmoplegia.   CN V: Normal facial sensation and strength of muscles of mastication.   CN VII: Facial movements are symmetric, no weakness.   CN VIII: Auditory acuity is normal.   CN IX & X: Symmetric palatal movement.   CN XI: Sternocleidomastoid and trapezius are normal. No weakness.   CN XII: The tongue is midline. No atrophy or fasciculations.   Motor: Normal muscle strength, bulk, and tone in upper and lower extremities. No fasciculations, rigidity, spasticity or abnormal movements.   Sensation: Normal light touch sensation.    Muscle stretch reflexes: Reflexes are normal and symmetric in the upper and lower extremities.   Plantar reflexes are  flexor bilaterally.   Coordination: Finger to nose test showed no dysmetria. Rapid alternating movements were normal. Heel to shin normal.  Laboratory results:  Lab Results   Component Value Date    TSH 1.180 11/15/2018     Lab Results   Component Value Date    APGBSTCQ22 352 11/15/2018     Lab Results   Component Value Date    HGBA1C 5.60 11/15/2018     Lab Results   Component Value Date    CHOL 183 11/15/2018    CHLPL 213 (H) 05/14/2018    CHLPL 211 (H) 03/27/2017     Lab Results   Component Value Date    TRIG 44 11/15/2018    TRIG 52 05/14/2018    TRIG 53 03/27/2017     Lab Results   Component Value Date    HDL 75 (H) 11/15/2018    HDL 84 (H) 05/14/2018    HDL 90 (H) 03/27/2017     Lab Results   Component Value Date    LDL 99 11/15/2018     (H) 05/14/2018     (H) 03/27/2017     Lab Results   Component Value Date    GLUCOSE 110 (H) 11/14/2018    BUN 18 11/14/2018    CREATININE 0.74 11/14/2018    EGFRIFNONA 69 05/14/2018    EGFRIFAFRI 96 11/14/2018    BCR 24.3 11/14/2018    K 3.8 11/14/2018    CO2 24.1 11/14/2018    CALCIUM 9.3 11/14/2018    PROTENTOTREF 6.9 05/14/2018    ALBUMIN 4.20 11/14/2018    LABIL2 1.7 05/14/2018    AST 24 11/14/2018    ALT 19 11/14/2018     Lab Results   Component Value Date    WBC 4.64 11/14/2018    HGB 12.8 11/14/2018    HCT 39.0 11/14/2018    MCV 91.1 11/14/2018     11/14/2018       Review and interpretation of imaging:  Ct Angiogram Head With & Without Contrast    Result Date: 11/15/2018  No evidence of acute infarction. The noncontrasted CT examination demonstrates a 7 mm area of increased attenuation involving the subcortical white matter of the frontoparietal region superiolaterally with a suggestion of minimal enhancement. There is no evidence of surrounding edema. The appearance is nonspecific. This may represent an underlying vascular lesion such as a cavernous malformation. However, underlying malignancy including hemorrhagic metastatic disease cannot be  excluded. Further evaluation with a MRI examination of the brain with and without contrast is recommended.  The noncontrasted CT examination of the brain was made available for interpretation at 1325 hours and results called to Dr. Cope at 1326 hours. The CT angiogram of the neck and head was made available for interpretation at 1334 hours and results called to Dr. Cope at 1336 hours.       Radiation dose reduction techniques were utilized, including automated exposure control and exposure modulation based on body size.      Check completion  This report was finalized on 11/15/2018 2:11 PM by Dr. Ray Ramirez M.D.      Ct Angiogram Neck With & Without Contrast    Result Date: 11/15/2018  No evidence of acute infarction. The noncontrasted CT examination demonstrates a 7 mm area of increased attenuation involving the subcortical white matter of the frontoparietal region superiolaterally with a suggestion of minimal enhancement. There is no evidence of surrounding edema. The appearance is nonspecific. This may represent an underlying vascular lesion such as a cavernous malformation. However, underlying malignancy including hemorrhagic metastatic disease cannot be excluded. Further evaluation with a MRI examination of the brain with and without contrast is recommended.  The noncontrasted CT examination of the brain was made available for interpretation at 1325 hours and results called to Dr. Cope at 1326 hours. The CT angiogram of the neck and head was made available for interpretation at 1334 hours and results called to Dr. Cope at 1336 hours.       Radiation dose reduction techniques were utilized, including automated exposure control and exposure modulation based on body size.      Check completion  This report was finalized on 11/15/2018 2:11 PM by Dr. Ray Ramirez M.D.      Mri Brain With & Without Contrast    Result Date: 11/14/2018  1. Approximately 1 cm low signal intensity lesion in the  right parietal lobe may represent a small cavernous angioma. 2. No evidence of acute infarction.      Ct Cerebral Perfusion With & Without Contrast    Result Date: 11/15/2018  No evidence of acute infarction. The noncontrasted CT examination demonstrates a 7 mm area of increased attenuation involving the subcortical white matter of the frontoparietal region superiolaterally with a suggestion of minimal enhancement. There is no evidence of surrounding edema. The appearance is nonspecific. This may represent an underlying vascular lesion such as a cavernous malformation. However, underlying malignancy including hemorrhagic metastatic disease cannot be excluded. Further evaluation with a MRI examination of the brain with and without contrast is recommended.  The noncontrasted CT examination of the brain was made available for interpretation at 1325 hours and results called to Dr. Cope at 1326 hours. The CT angiogram of the neck and head was made available for interpretation at 1334 hours and results called to Dr. Cope at 1336 hours.       Radiation dose reduction techniques were utilized, including automated exposure control and exposure modulation based on body size.      Check completion  This report was finalized on 11/15/2018 2:11 PM by Dr. Ray Ramirez M.D.        11/14/2018 EKG    Impression:  This is a 61 yo female with PMH of headaches, thyroid disease, HTN, who presented to the ER on 11/14/2018 with c/o left frontal and facial H/A that started in the morning but became increasingly worse throughout that afternoon in which she developed nasal tenderness to palpation and photophobia.  She also developed left facial numbness and at that time became ataxic and felt clumsy as she was walking.  On exam today patient still has a headache but it has much improved, she denies any dizziness, left facial numbness, or photophobia.      Assessment/Plan:  Acute ataxia  Migraine Headache w/ sensory  aura  Cavernoma  HTN  ASA 325mg  Lipitor 80mg  F/ U with primary care outpt for better BP control   Patient was advised that she can take Migralief OTC for H/A relief or can take 400mg ibuprofen as needed  F/U as needed in neurology clinic  Follow up with Cardiology outpt for abnormal EKG    Case discussed with Dr. Cope on 11/15/2018, he agrees with plan above.  Fara Subramanian APRN

## 2018-11-15 NOTE — SIGNIFICANT NOTE
11/15/18 1102   Rehab Time/Intention   Evaluation Not Performed other (see comments)  (Pt at baseline for mobility; pt states up ad gerald in room w, no unsteadiness or LOB.  No equipment needs at this time. No skilled PT warranted at this time. Will sign off. )   Rehab Treatment   Discipline physical therapist

## 2018-11-15 NOTE — PLAN OF CARE
Problem: Patient Care Overview  Goal: Plan of Care Review  Outcome: Ongoing (interventions implemented as appropriate)   11/15/18 0559   Coping/Psychosocial   Plan of Care Reviewed With patient   OTHER   Outcome Summary pt is alert and oriented, NIH = 0, room air, NSR, MRI completed, no falls, continue to monitor   Plan of Care Review   Progress improving     Goal: Individualization and Mutuality  Outcome: Ongoing (interventions implemented as appropriate)    Goal: Discharge Needs Assessment  Outcome: Ongoing (interventions implemented as appropriate)      Problem: Stroke (Ischemic) (Adult)  Goal: Signs and Symptoms of Listed Potential Problems Will be Absent, Minimized or Managed (Stroke)  Outcome: Ongoing (interventions implemented as appropriate)

## 2018-11-15 NOTE — NURSING NOTE
Awaiting echo and results prior to discharging patient.  Echo dept to get patient after lunch per [facility].

## 2018-11-15 NOTE — PLAN OF CARE
Problem: Patient Care Overview  Goal: Plan of Care Review   11/15/18 1230   Coping/Psychosocial   Plan of Care Reviewed With patient   OTHER   Outcome Summary Alert and oriented x4, c/o mild headache but refuses tylenol. NIH 0, awaiting echo today after lunch, no falls, will CTM   Plan of Care Review   Progress improving

## 2018-11-15 NOTE — DISCHARGE SUMMARY
[facility]    Date of Discharge:  11/15/2018    PCP: Provider, No Known    Discharge Diagnosis:   Active Hospital Problems    Diagnosis Date Noted   • **Hypertensive disorder [I10] 11/14/2018   • Complicated migraine [G43.109] 11/14/2018   • HTN (hypertension) [I10] 11/14/2018      Resolved Hospital Problems    Diagnosis Date Noted Date Resolved   • Gait instability [R26.81] 11/14/2018 11/15/2018     Procedures Performed       Consults     Date and Time Order Name Status Description    11/14/2018 1630 Inpatient Neurology Consult Stroke      11/14/2018 1514 Inpatient Cardiology Consult      11/14/2018 1357 [facility] (on-call MD unless specified) Completed         Hospital Course  Please see history and physical for details. Patient is a 62 y.o. female is a wonderfully pleasant 62-year-old female who still works as a [employer]  actually still delivers mail on foot.  As result of that she is in pretty good shape physically.  Ultimately she presented to the hospital with dizziness and gait instability.  Ultimately the concern was to rule out stroke and MRI brain is negative for any acute intracranial CVA type etiology.  She presented with blood pressures that were extremely high with a max of 192/114.  After MRI was obtained and I knew that was negative for acute CVA phone, hypertension was then the likely the leading diagnosis that probably triggered her migraine headache.  Patient was started on Norvasc at 5 mg and her blood pressure today is 133/73 and she's having improvement in her headache symptoms.  Her previous issues with gait instability have resolved.  EKG was mildly abnormal and she started to demonstrate changes secondary to hypertension.  We did notice an incidental finding on the MRI of her brain that I discussed this with neurology that there is no need for further workup as it is a cavernous angioma.  I did consult cardiology secondary to the  subtle abnormalities on EKG as the patient does not follow-up with any primary care physician and we will get her a list of physicians where she can choose post discharge.  Ultimately echocardiogram is pending and after this is obtained the patient can be discharged home.  Cardiology has a good plan wound which they will see the patient back in a week's time and can go for echocardiogram results as well as monitor blood pressure until patient is able to get into a PCP.  She has no other additional neurological symptoms and the patient is clinically improved at this point.  She does not need a statin upon discharge based on her fasting lipid panel and patient also has no other issues with diabetes or disorders.  All questions answered to patient prior to this disposition and she is very much amenable to the above plan.        Condition on Discharge: Improved.     Temp:  [97.3 °F (36.3 °C)-98.8 °F (37.1 °C)] 98.1 °F (36.7 °C)  Heart Rate:  [59-92] 62  Resp:  [14-18] 18  BP: (121-192)/() 133/73  Body mass index is 24.94 kg/m².    Physical Exam   Constitutional: She is oriented to person, place, and time. She appears well-developed and well-nourished.   HENT:   Head: Normocephalic and atraumatic.   Eyes: Conjunctivae and EOM are normal. Pupils are equal, round, and reactive to light.   Neck: Neck supple. No JVD present.   Cardiovascular: Normal rate, regular rhythm and normal heart sounds.   Pulmonary/Chest: Effort normal and breath sounds normal. No respiratory distress.   Abdominal: Soft. Bowel sounds are normal. She exhibits no distension. There is no tenderness.   Musculoskeletal: She exhibits no edema.   Neurological: She is alert and oriented to person, place, and time. No cranial nerve deficit. Coordination normal.   Psychiatric: She has a normal mood and affect. Her behavior is normal. Thought content normal.        Discharge Medications      New Medications      Instructions Start Date   amLODIPine 5 MG  tablet  Commonly known as:  NORVASC   5 mg, Oral, Every 24 Hours Scheduled      aspirin 81 MG EC tablet   81 mg, Oral, Daily              Additional Instructions for the Follow-ups that You Need to Schedule     Discharge Follow-up with PCP   As directed       Currently Documented PCP:    Provider, No Known    PCP Phone Number:    [phone]     Follow Up Details:  needs PCP list prior to DC - [facility] per their recs           Follow-up Information     Provider, No Known .    Why:  needs PCP list prior to DC - [facility] per their recs  Contact information:  [place] [id number]  [phone]                 Test Results Pending at Discharge     Nael Fitch MD  11/15/18  10:02 AM    Discharge time spent greater than 30 minutes.

## 2018-11-15 NOTE — DISCHARGE INSTR - APPOINTMENTS
Follow up with Dr. Cross in 4 weeks, Nayla LLOYD in one week and PCP as soon as possible.  Call and make appointments when you get home.

## 2018-11-19 LAB — CREAT BLDA-MCNC: 0.7 MG/DL (ref 0.6–1.3)

## 2018-11-21 ENCOUNTER — OFFICE VISIT (OUTPATIENT)
Dept: CARDIOLOGY | Facility: CLINIC | Age: 63
End: 2018-11-21

## 2018-11-21 VITALS
OXYGEN SATURATION: 97 % | HEART RATE: 72 BPM | BODY MASS INDEX: 26.02 KG/M2 | WEIGHT: 152.4 LBS | HEIGHT: 64 IN | SYSTOLIC BLOOD PRESSURE: 138 MMHG | DIASTOLIC BLOOD PRESSURE: 82 MMHG

## 2018-11-21 DIAGNOSIS — I10 ESSENTIAL HYPERTENSION: Primary | ICD-10-CM

## 2018-11-21 PROCEDURE — 99213 OFFICE O/P EST LOW 20 MIN: CPT | Performed by: NURSE PRACTITIONER

## 2018-11-21 NOTE — PROGRESS NOTES
Patient Name: Julissa Armenta  :1955  Age: 62 y.o.  Primary Cardiologist: Harry Cross MD  Encounter Provider:  GABINO Reed      Chief Complaint:   Chief Complaint   Patient presents with   • Hypertension         HPI  Julissa Armenta is a 62 y.o. female with a history significant for hypertension, complicated migraines.  Patient was hospitalized 18-11/15/18 for episodes of dizziness with gait instability.  MRI brain was negative for any acute intracranial abnormalities.  Blood pressures were extremely high during hospitalization at 192/114.  Patient was started on amlodipine 5 mg and her blood pressure improved to 133/73.  Headaches improved.  Gait instability resolved.  Patient presents today for 1 week post hospital visit.  Patient is new to me have reviewed prior medical records.  Patient states since hospitalization she has not had any further episodes of headache, dizziness, gait instability.  She reports that she has done well with all of her new antihypertensive medications.  She denies any episodes of chest pain, shortness of breath, palpitations, lightheadedness, swelling, fatigue.  Reports that her blood pressure has been more controlled at home.      The following portions of the patient's history were reviewed and updated as appropriate: allergies, current medications, past family history, past medical history, past social history, past surgical history and problem list.    Current Outpatient Medications on File Prior to Visit   Medication Sig   • amLODIPine (NORVASC) 5 MG tablet Take 1 tablet by mouth Daily.   • aspirin 81 MG EC tablet Take 1 tablet by mouth Daily.     No current facility-administered medications on file prior to visit.          Review of Systems   Constitution: Positive for malaise/fatigue.   Eyes: Positive for pain.   Cardiovascular: Negative for chest pain and leg swelling.   Respiratory: Negative for shortness of breath.    Gastrointestinal:  "Positive for nausea and vomiting.   Genitourinary: Positive for frequency.   Neurological: Positive for headaches and numbness. Negative for light-headedness.   All other systems reviewed and are negative.      OBJECTIVE:   Vital Signs  Vitals:    11/21/18 1025   BP: 138/82   Pulse: 72   SpO2: 97%     Estimated body mass index is 26.16 kg/m² as calculated from the following:    Height as of this encounter: 162.6 cm (64\").    Weight as of this encounter: 69.1 kg (152 lb 6.4 oz).    Physical Exam   Constitutional: She is oriented to person, place, and time. Vital signs are normal. She appears well-developed and well-nourished. She is active.   Eyes: Conjunctivae are normal.   Neck: Carotid bruit is not present.   Cardiovascular: Normal rate, regular rhythm and normal heart sounds.   Pulmonary/Chest: Breath sounds normal.   Abdominal: Normal appearance.   Musculoskeletal:   No cyanosis, clubbing, edema  Normal ROM   Neurological: She is alert and oriented to person, place, and time. GCS eye subscore is 4. GCS verbal subscore is 5. GCS motor subscore is 6.   Skin: Skin is warm, dry and intact.   Psychiatric: She has a normal mood and affect. Her speech is normal and behavior is normal. Judgment and thought content normal. Cognition and memory are normal.       Procedures    Cardiac Echo:  11/15/2018:  · Left ventricular systolic function is normal. Calculated EF = 63%. Estimated EF was in agreement with the calculated EF. Estimated EF = 63%. Normal left ventricular cavity size noted. Left ventricular wall thickness is consistent with mild concentric hypertrophy. Left ventricular diastolic function is normal.  · There is mild thickening of the aortic valve. No aortic valve regurgitation is present.  · Trace mitral valve regurgitation is present.  · Trace tricuspid valve regurgitation is present. Estimated right ventricular systolic pressure from tricuspid regurgitation is normal (<35 mmHg). Calculated right ventricular " systolic pressure from tricuspid regurgitation is 21 mmHg.  · Normal left atrial volume noted. Saline test results are negative.      ASSESSMENT:      Diagnosis Plan   1. Essential hypertension           PLAN OF CARE:     1. Hypertension: Patient states that she has not had any further episodes of headache, dizziness or gait instability.  Reports blood pressure has been controlled at home.  Blood pressure in office today 138/82.  She states that she is doing well with her current medication regimen and has not noted any side effects.  Overall she feels much improved from hospitalization.  2. Follow-up with Dr. Cross as previously scheduled on 12/19/18.  Call the office with any concerns.          Thank you for allowing me to participate in the care of your patient,      Sincerely,   GABINO Reed  [facility]  11/21/18  10:46 AM

## 2018-12-03 ENCOUNTER — OFFICE VISIT (OUTPATIENT)
Dept: INTERNAL MEDICINE | Age: 63
End: 2018-12-03

## 2018-12-03 VITALS
BODY MASS INDEX: 25.57 KG/M2 | DIASTOLIC BLOOD PRESSURE: 72 MMHG | HEART RATE: 74 BPM | OXYGEN SATURATION: 100 % | HEIGHT: 64 IN | SYSTOLIC BLOOD PRESSURE: 118 MMHG | TEMPERATURE: 98.5 F | WEIGHT: 149.8 LBS

## 2018-12-03 DIAGNOSIS — M81.8 OTHER OSTEOPOROSIS WITHOUT CURRENT PATHOLOGICAL FRACTURE: ICD-10-CM

## 2018-12-03 DIAGNOSIS — I10 ESSENTIAL HYPERTENSION: Primary | ICD-10-CM

## 2018-12-03 DIAGNOSIS — R94.6 ABNORMAL THYROID EXAM: ICD-10-CM

## 2018-12-03 PROCEDURE — 99214 OFFICE O/P EST MOD 30 MIN: CPT | Performed by: NURSE PRACTITIONER

## 2018-12-03 NOTE — PATIENT INSTRUCTIONS

## 2018-12-03 NOTE — PROGRESS NOTES
"[facility]      Julissa CARLISLE Black / 62 y.o. / female  12/03/2018      ASSESSMENT & PLAN:    Problem List Items Addressed This Visit        Cardiovascular and Mediastinum    HTN (hypertension) - Primary    Relevant Medications    amLODIPine (NORVASC) 5 MG tablet       Musculoskeletal and Integument    Other osteoporosis without current pathological fracture    Relevant Orders    DEXA Bone Density Axial    Vitamin D 25 Hydroxy      Other Visit Diagnoses     Abnormal thyroid exam        Relevant Orders    Thyroid Peroxidase Antibody    TSH Rfx On Abnormal To Free T4    Thyroglobulin With Anti-TG    US Thyroid        Orders Placed This Encounter   Procedures   • DEXA Bone Density Axial   • US Thyroid   • Thyroid Peroxidase Antibody   • TSH Rfx On Abnormal To Free T4   • Thyroglobulin With Anti-TG   • Vitamin D 25 Hydroxy     No orders of the defined types were placed in this encounter.      Summary/Discussion:    1. Essential hypertension  Currently controlled on amlodipine 5 mg daily.  Patient is adamant that she would like to eventually come off of the medication as she believes that her poor dietary choices were the cause of her recently elevated BP. Discussed with patient to keep follow up with cardiologist as scheduled, discuss with cardiology. Will continue amlodipine for now.     2. Other osteoporosis without current pathological fracture  Mike to obtain previous DEXA scan from years ago.  We'll order new DEXA scan for reevaluation, consider initiation of alendronate pending results.  Check vitamin D level.  - DEXA Bone Density Axial  - Vitamin D 25 Hydroxy    3. Abnormal thyroid exam  Abnormal heterogeneous appearance of thyroid noted incidentally on recent CTA.  We will order dedicated ultrasound of thyroid in addition to other antibody testing. She is s/p right thyroidectomy for \"goiter\", I will attempt to obtain these previous records for review. Further evaluation pending results of labs and " "imaging.     - Thyroid Peroxidase Antibody  - TSH Rfx On Abnormal To Free T4  - Thyroglobulin With Anti-TG  - US Thyroid        Return in about 3 months (around 3/3/2019) for Next scheduled follow up.    ____________________________________________________________________    VITALS:    Visit Vitals  /72   Pulse 74   Temp 98.5 °F (36.9 °C)   Ht 162.6 cm (64\")   Wt 67.9 kg (149 lb 12.8 oz)   SpO2 100%   BMI 25.71 kg/m²       BP Readings from Last 3 Encounters:   12/03/18 118/72   11/21/18 138/82   11/15/18 133/73     Wt Readings from Last 3 Encounters:   12/03/18 67.9 kg (149 lb 12.8 oz)   11/21/18 69.1 kg (152 lb 6.4 oz)   11/15/18 65.8 kg (145 lb)      Body mass index is 25.71 kg/m².    CC: Main reason(s) for today's visit: Establish Care (new pt to establish care; ER f/u 11/15/2018 elevated BP, headache; /114 on admission; pt was started on amlodipine 5mg); Follow-up; and Hypertension      HPI:    Patient is a 62 y.o. female who is here to establish care. Has not had a previous PCP In some time, was managed by Dr. Duran.     She was recently admitted to hospital on 11/14/18 for elevated BP and atypical migraine. At that time, CTA of head and neck and brain MRI were negative. She was discharged on amlodipine 5mg per cardiology and has been doing well on this to this point. She saw the cardiology nurse practitioner on 11/21 and has follow up with Dr. Cross in the next couple weeks.     She works as a  and reports she was not eating correctly r/t stress and had significantly increased her intake of processed and high sodium foods, which she has recently reduced. She has a strong family of hypertension and many of her first-degree relatives.    History of osteoporosis, last DEXA scan many years ago. Was on alendronate many years ago, reports she stopped taking it due to cost. Was scheduled to have DEXA scan earlier this year but has not had done.     She is s/p right thyroidectomy many " "years ago for what she reports as \"a mass that they thought was precancerous, but isn't\". No family history thyroid issues.     Patient Care Team:  Swetha Zaragoza APRN as PCP - General (Internal Medicine)  Provider, No Known as PCP - Family Medicine  Harry Cross MD as Consulting Physician (Cardiology)  ____________________________________________________________________      REVIEW OF SYSTEMS    Review of Systems   Constitutional: Negative for activity change and appetite change.   Eyes: Negative for visual disturbance.   Respiratory: Negative for shortness of breath.    Cardiovascular: Negative for chest pain and leg swelling.   Gastrointestinal: Negative for constipation.   Endocrine: Negative for cold intolerance and heat intolerance.   Neurological: Negative for dizziness and headaches.       PHYSICAL EXAMINATION    Physical Exam   Constitutional: She is oriented to person, place, and time. Vital signs are normal. She appears well-developed and well-nourished. She is cooperative. She does not appear ill. No distress.   Neck: No thyroid mass and no thyromegaly present.   Cardiovascular: Normal rate, regular rhythm, S1 normal, S2 normal and normal heart sounds.   No murmur heard.  Pulmonary/Chest: Effort normal and breath sounds normal. She has no decreased breath sounds. She has no wheezes. She has no rhonchi. She has no rales.   Neurological: She is alert and oriented to person, place, and time.   Skin: Skin is warm, dry and intact.   Psychiatric: She has a normal mood and affect. Her speech is normal and behavior is normal. Judgment and thought content normal. Cognition and memory are normal.   Nursing note and vitals reviewed.        REVIEWED DATA:    Labs:   Lab Results   Component Value Date     11/14/2018    K 3.8 11/14/2018    AST 24 11/14/2018    ALT 19 11/14/2018    BUN 18 11/14/2018    CREATININE 0.70 11/14/2018    CREATININE 0.74 11/14/2018    CREATININE 0.83 05/14/2018    EGFRIFNONA " 69 05/14/2018    EGFRIFAFRI 96 11/14/2018       Lab Results   Component Value Date    GLUCOSE 110 (H) 11/14/2018    HGBA1C 5.60 11/15/2018    HGBA1C 5.80 (H) 03/27/2017       Lab Results   Component Value Date    LDL 99 11/15/2018     (H) 05/14/2018     (H) 03/27/2017    HDL 75 (H) 11/15/2018    TRIG 44 11/15/2018       Lab Results   Component Value Date    TSH 1.180 11/15/2018          Lab Results   Component Value Date    WBC 4.64 11/14/2018    HGB 12.8 11/14/2018    HGB 13.5 05/14/2018    HGB 13.3 03/27/2017     11/14/2018         Imaging:     CT ANGIOGRAM OF THE NECK AND HEAD WITH CONTRAST AND CT PERFUSION WITH  CONTRAST     HISTORY: Headache, dizziness, left facial paresthesias.     COMPARISON: None.     A noncontrasted CT examination of the brain was initially performed.     FINDINGS: The brain ventricles are symmetrical. There is a 7 mm area of  increased attenuation involving the subcortical white matter in the  right frontoparietal region superiolaterally. No other areas of  increased attenuation are noted. Mild vascular calcification is  appreciated.     The above information was called to and discussed with Dr. Cope.     Dr. Cope requested further evaluation with a CT perfusion and CT  angiography.     CT PERFUSION: CT perfusion shows no evidence of abnormal perfusion.     CT angiogram of the neck and head with contrast: The CT angiogram of the  neck and head was performed. Multiplanar as well as 3-dimensional  reconstructions were also generated.     The great vessels are arranged in a bovine configuration. There is mild  atherosclerotic disease involving the carotid bifurcations bilaterally  but with 0% stenosis using NASCET criteria. The proximal aspects of the  anterior and middle cerebral arteries appear unremarkable.     The left vertebral artery is minimally larger than that of the right.  The basilar artery and the proximal aspects of the posterior cerebral  arteries  appear unremarkable.     There is mild reversal of the normal cervical lordosis.     C2-3: There is no evidence of disc bulge or herniation.     C3-4: There is a mild central disc bulge. There is moderate neural  femoral compromise on the right secondary to uncovertebral degenerative  disease.     C4-5: There is severe facet degenerative disease on the right. There is  moderate to severe neural foraminal compromise bilaterally secondary to  uncovertebral degenerative disease.     C5-6: There is severe facet degenerative disease on the left. There is  moderate neural foraminal compromise on the left secondary to facet  degenerative disease and uncovertebral degenerative disease.     C6-7: There is a mild broad-based disc osteophyte complex with no  evidence of herniation. There is moderate neural foraminal compromise on  the left secondary to uncovertebral degenerative disease.     C7-T1: There is no evidence of disc bulge or herniation.     Note is made of asymmetry of the thyroid. The left lobe of thyroid is  mildly heterogeneous. There is enhancing tissue present involving the  expected location of the right lobe of thyroid. This measures  approximately 3 x 10 mm in size. Clinical correlation is required.  Correlation as to the nature of the right thyroidectomy is recommended.  This may represent residual thyroid tissue. The appearance is  nonspecific. Should prior imaging be made available for comparison, I  would be more than happy to compare the studies and to generate a  supplemental report.     A standard postcontrast CT examination of the brain was performed which  demonstrates subtle enhancement related to the right frontoparietal  region of increased attenuation noted on precontrast examination. No  other areas of abnormal or atypical enhancement are suggested.     IMPRESSION:  No evidence of acute infarction. The noncontrasted CT  examination demonstrates a 7 mm area of increased attenuation  involving  the subcortical white matter of the frontoparietal region  superiolaterally with a suggestion of minimal enhancement. There is no  evidence of surrounding edema. The appearance is nonspecific. This may  represent an underlying vascular lesion such as a cavernous  malformation. However, underlying malignancy including hemorrhagic  metastatic disease cannot be excluded. Further evaluation with a MRI  examination of the brain with and without contrast is recommended.     The noncontrasted CT examination of the brain was made available for  interpretation at 1325 hours and results called to Dr. Cope at 1326  hours. The CT angiogram of the neck and head was made available for  interpretation at 1334 hours and results called to Dr. Cope at 1336  hours.                    Radiation dose reduction techniques were utilized, including automated  exposure control and exposure modulation based on body size.                 Check completion     This report was finalized on 11/15/2018 2:11 PM by Dr. Ray Ramirez M.D.       Medical Tests:        Summary of old records / correspondence / consultant report:        Request outside records:        ______________________________________________________________________    ALLERGIES  Allergies   Allergen Reactions   • Cephalexin Rash        MEDICATIONS  Current Outpatient Medications   Medication Sig Dispense Refill   • amLODIPine (NORVASC) 5 MG tablet Take 1 tablet by mouth Daily.     • aspirin 81 MG EC tablet Take 1 tablet by mouth Daily.       No current facility-administered medications for this visit.        PFSH:     The following portions of the patient's history were reviewed and updated as appropriate: Allergies / Current Medications / Past Medical History / Surgical History / Social History / Family History    PROBLEM LIST   Patient Active Problem List   Diagnosis   • Menopause   • Complicated migraine   • Hypertensive disorder   • HTN (hypertension)   •  Other osteoporosis without current pathological fracture       PAST MEDICAL HISTORY  Past Medical History:   Diagnosis Date   • Disease of thyroid gland    • Goiter    • Hypertension        SURGICAL HISTORY  Past Surgical History:   Procedure Laterality Date   • COLONOSCOPY      2015   • FINGER SURGERY     • THYROID SURGERY         SOCIAL HISTORY  Social History     Socioeconomic History   • Marital status: Single     Spouse name: Not on file   • Number of children: Not on file   • Years of education: Not on file   • Highest education level: Not on file   Occupational History   • Occupation:     Tobacco Use   • Smoking status: Never Smoker   • Smokeless tobacco: Never Used   Substance and Sexual Activity   • Alcohol use: No   • Drug use: No   • Sexual activity: No       FAMILY HISTORY  Family History   Problem Relation Age of Onset   • Colon cancer Maternal Grandmother    • Hypertension Mother    • Diabetes Mother    • Multiple myeloma Mother    • Hypertension Sister    • Heart disease Sister    • Diabetes Brother    • Hypertension Brother    • Hypertension Sister    • Liver cancer Brother    • Hypertension Brother          **Brandonon Disclaimer:   Much of this encounter note is an electronic transcription/translation of spoken language to printed text. The electronic translation of spoken language may permit erroneous, or at times, nonsensical words or phrases to be inadvertently transcribed. Although I have reviewed the note for such errors, some may still exist.

## 2018-12-04 LAB
25(OH)D3+25(OH)D2 SERPL-MCNC: 26.1 NG/ML (ref 30–100)
THYROGLOB AB SERPL-ACNC: <1 IU/ML (ref 0–0.9)
THYROGLOB SERPL-MCNC: 29.1 NG/ML (ref 1.5–38.5)
THYROPEROXIDASE AB SERPL-ACNC: 10 IU/ML (ref 0–34)
TSH SERPL DL<=0.005 MIU/L-ACNC: 0.88 MIU/ML (ref 0.27–4.2)

## 2018-12-06 ENCOUNTER — TELEPHONE (OUTPATIENT)
Dept: INTERNAL MEDICINE | Age: 63
End: 2018-12-06

## 2018-12-06 NOTE — TELEPHONE ENCOUNTER
----- Message from Padma Guzman LPN sent at 12/3/2018 12:44 PM EST -----  S/w Dr. Pickens's office. They do not have any records of a colonoscopy for this pt.    There's also no chart in [employer] for pt.     TRISH Zuniga  ----- Message -----  From: Swetha Zaragoza APRN  Sent: 12/3/2018  11:42 AM  To: Padma Guzman LPN    Patient had colonoscopy in 2016, I'm assuming by Nayla Pickens, can you find old records? Also previous records for DEXA scan if they exist? (should have been here). Thanks.

## 2018-12-06 NOTE — TELEPHONE ENCOUNTER
Patient needs an updated colonoscopy. Let her know that you could not find any records for this. We can place referral for colonoscopy if she would like.

## 2018-12-07 NOTE — TELEPHONE ENCOUNTER
Pt is adamant that she had colonoscopy done at [facility], ordered by Dr. Duran. Pt stated she will contact his office to ask about records. I will contact [facility] and try to locate records there.    KD

## 2018-12-19 ENCOUNTER — OFFICE VISIT (OUTPATIENT)
Dept: CARDIOLOGY | Facility: CLINIC | Age: 63
End: 2018-12-19

## 2018-12-19 VITALS
SYSTOLIC BLOOD PRESSURE: 115 MMHG | DIASTOLIC BLOOD PRESSURE: 70 MMHG | WEIGHT: 147.8 LBS | HEIGHT: 64 IN | HEART RATE: 64 BPM | OXYGEN SATURATION: 98 % | BODY MASS INDEX: 25.23 KG/M2

## 2018-12-19 DIAGNOSIS — I10 ESSENTIAL HYPERTENSION: Primary | ICD-10-CM

## 2018-12-19 PROCEDURE — 99213 OFFICE O/P EST LOW 20 MIN: CPT | Performed by: INTERNAL MEDICINE

## 2018-12-26 NOTE — PROGRESS NOTES
Subjective:     Encounter Date:12/19/2018      Patient ID: Julissa Armenta is a 63 y.o. female.    Chief Complaint:  Hypertension   This is a recurrent problem. The problem is controlled. Pertinent negatives include no anxiety, chest pain, malaise/fatigue, palpitations, peripheral edema or shortness of breath.     63-year-old female who presents today for reevaluation.  Clinically she is doing great.  Current regimen has kept her blood pressure under control and with that she has felt significantly better.  She denies a types of chest pain shortness of breath.    Review of Systems   Constitution: Negative for malaise/fatigue.   Cardiovascular: Negative for chest pain and palpitations.   Respiratory: Negative for shortness of breath.    All other systems reviewed and are negative.      Procedures       Objective:     Physical Exam   Constitutional: She is oriented to person, place, and time. She appears well-developed.   HENT:   Head: Normocephalic.   Eyes: Conjunctivae are normal.   Neck: Normal range of motion.   Cardiovascular: Normal rate, regular rhythm and normal heart sounds.   Pulmonary/Chest: Breath sounds normal.   Abdominal: Soft. Bowel sounds are normal.   Musculoskeletal: Normal range of motion. She exhibits no edema.   Neurological: She is alert and oriented to person, place, and time.   Skin: Skin is warm and dry.   Psychiatric: She has a normal mood and affect. Her behavior is normal.   Vitals reviewed.      Lab Review:       Assessment:          Diagnosis Plan   1. Essential hypertension            Plan:       1.  Hypertension blood pressures good clinically she is doing great.  In light of that I would continue the same follow-up in 6 months she will see one of our nurse practitioners.  Patient was told to call if any further issues.

## 2019-01-07 ENCOUNTER — HOSPITAL ENCOUNTER (OUTPATIENT)
Dept: BONE DENSITY | Facility: HOSPITAL | Age: 64
Discharge: HOME | End: 2019-01-07
Admitting: NURSE PRACTITIONER

## 2019-01-07 ENCOUNTER — HOSPITAL ENCOUNTER (OUTPATIENT)
Dept: ULTRASOUND IMAGING | Facility: HOSPITAL | Age: 64
Discharge: HOME | End: 2019-01-07

## 2019-01-07 PROCEDURE — 77080 DXA BONE DENSITY AXIAL: CPT

## 2019-01-07 PROCEDURE — 76536 US EXAM OF HEAD AND NECK: CPT

## 2019-01-08 DIAGNOSIS — M81.6 LOCALIZED OSTEOPOROSIS WITHOUT CURRENT PATHOLOGICAL FRACTURE: Primary | ICD-10-CM

## 2019-01-08 RX ORDER — ALENDRONATE SODIUM 70 MG/1
70 TABLET ORAL
Qty: 4 TABLET | Refills: 11 | Status: SHIPPED | OUTPATIENT
Start: 2019-01-08 | End: 2019-12-09 | Stop reason: SDUPTHER

## 2019-01-09 DIAGNOSIS — E04.2 MULTIPLE THYROID NODULES: Primary | ICD-10-CM

## 2019-03-04 ENCOUNTER — OFFICE VISIT (OUTPATIENT)
Dept: INTERNAL MEDICINE | Age: 64
End: 2019-03-04

## 2019-03-04 VITALS
TEMPERATURE: 97.1 F | SYSTOLIC BLOOD PRESSURE: 118 MMHG | WEIGHT: 146 LBS | OXYGEN SATURATION: 99 % | HEART RATE: 67 BPM | DIASTOLIC BLOOD PRESSURE: 74 MMHG | BODY MASS INDEX: 24.92 KG/M2 | HEIGHT: 64 IN

## 2019-03-04 DIAGNOSIS — I10 ESSENTIAL HYPERTENSION: Primary | ICD-10-CM

## 2019-03-04 DIAGNOSIS — M81.8 OTHER OSTEOPOROSIS WITHOUT CURRENT PATHOLOGICAL FRACTURE: ICD-10-CM

## 2019-03-04 DIAGNOSIS — B35.1 ONYCHOMYCOSIS OF TOENAIL: ICD-10-CM

## 2019-03-04 DIAGNOSIS — E04.2 MULTIPLE THYROID NODULES: ICD-10-CM

## 2019-03-04 LAB
ALBUMIN SERPL-MCNC: 4 G/DL (ref 3.5–5.2)
ALBUMIN/GLOB SERPL: 1.6 G/DL
ALP SERPL-CCNC: 56 U/L (ref 39–117)
ALT SERPL-CCNC: 22 U/L (ref 1–33)
AST SERPL-CCNC: 23 U/L (ref 1–32)
BILIRUB SERPL-MCNC: 0.4 MG/DL (ref 0.1–1.2)
BUN SERPL-MCNC: 18 MG/DL (ref 8–23)
BUN/CREAT SERPL: 22.5 (ref 7–25)
CALCIUM SERPL-MCNC: 9.4 MG/DL (ref 8.6–10.5)
CHLORIDE SERPL-SCNC: 106 MMOL/L (ref 98–107)
CO2 SERPL-SCNC: 26.4 MMOL/L (ref 22–29)
CREAT SERPL-MCNC: 0.8 MG/DL (ref 0.57–1)
GLOBULIN SER CALC-MCNC: 2.5 GM/DL
GLUCOSE SERPL-MCNC: 100 MG/DL (ref 65–99)
POTASSIUM SERPL-SCNC: 5.1 MMOL/L (ref 3.5–5.2)
PROT SERPL-MCNC: 6.5 G/DL (ref 6–8.5)
SODIUM SERPL-SCNC: 144 MMOL/L (ref 136–145)

## 2019-03-04 PROCEDURE — 99214 OFFICE O/P EST MOD 30 MIN: CPT | Performed by: NURSE PRACTITIONER

## 2019-03-04 RX ORDER — TERBINAFINE HYDROCHLORIDE 250 MG/1
250 TABLET ORAL DAILY
Qty: 90 TABLET | Refills: 0 | Status: SHIPPED | OUTPATIENT
Start: 2019-03-04 | End: 2019-06-17

## 2019-03-04 NOTE — PROGRESS NOTES
Subjective   Julissa Armenta is a 63 y.o. female.     History of Present Illness     Patient here today for follow-up multiple chronic medical problems.    Hypertension: Has been off of amlodipine since December 2018.  She reports she has been checking blood pressures twice daily at home and they have been within acceptable range, usually 110s/ 60-70s. She saw Dr. Cross in December.  She reports improvement in diet today since last visit, works as a  so is getting plenty of exercise.    Osteoporosis: Had update DEXA scan 12/2018, which demonstrated continued osteoporosis of both spine and hips.  She had been off of alendronate for a period of some years, recently restarted back in January.  She denies any side effects from the medication.    Onchyomycosis: Reports significant thickening of bilateral toenails.  Because she works as a , her feet are often subjected to excessive moisture.  She has been using some type of over-the-counter topical product in addition to some oral pill that she has been taking that she got over the Internet without improvement.     The following portions of the patient's history were reviewed and updated as appropriate: allergies, current medications, past family history, past medical history, past social history, past surgical history and problem list.    Review of Systems   Constitutional: Negative for activity change, appetite change, unexpected weight gain and unexpected weight loss.   Respiratory: Negative for shortness of breath.    Cardiovascular: Negative for chest pain, palpitations and leg swelling.   Neurological: Negative for dizziness, light-headedness and headache.       Objective   Physical Exam   Constitutional: She appears well-developed and well-nourished. She is active. She does not appear ill. No distress.   Cardiovascular: Normal rate, regular rhythm and normal heart sounds.   Pulmonary/Chest: Effort normal and breath sounds normal. She has  no decreased breath sounds. She has no wheezes. She has no rhonchi. She has no rales.   Neurological: She is alert.   Skin:   Significant thickening and dark discoloration of great toe and second toes both feet, R > L.    Nursing note and vitals reviewed.        Assessment/Plan   Problems Addressed this Visit        Cardiovascular and Mediastinum    HTN (hypertension) - Primary       Endocrine    Multiple thyroid nodules       Musculoskeletal and Integument    Other osteoporosis without current pathological fracture    Onychomycosis of toenail    Relevant Medications    terbinafine (LAMISIL) 250 MG tablet    Other Relevant Orders    Comprehensive metabolic panel        1. Essential hypertension  Controlled off of medication.  Patient has been diligent at monitoring blood pressure at home.  Recommended to continue to monitor, notify me if blood pressure stays consistently greater than 140/80.  Otherwise, continue to monitor diet and follow-up with cardiology as scheduled.  Will have patient follow-up with me in 1 year, sooner as needed.    2. Other osteoporosis without current pathological fracture  Continue alendronate.    3. Onychomycosis of toenail  Discussed treatment of onychomycosis with oral antifungals. Check CMP today, will recheck labs in 6 weeks.  We will treat for 12 weeks.  Instructed patient to notify me if no resolution of symptoms after 12 weeks, may need to consider referral to podiatry at that time or extension of medication dosing.    - Comprehensive metabolic panel  - terbinafine (LAMISIL) 250 MG tablet; Take 1 tablet by mouth Daily.  Dispense: 90 tablet; Refill: 0    4. Multiple thyroid nodules  Had recent stable thyroid ultrasound, recommended follow-up 4-6 months.  Next thyroid ultrasound scheduled for April 2019.

## 2019-03-27 ENCOUNTER — TELEPHONE (OUTPATIENT)
Dept: INTERNAL MEDICINE | Age: 64
End: 2019-03-27

## 2019-03-27 DIAGNOSIS — B35.1 ONYCHOMYCOSIS OF TOENAIL: Primary | ICD-10-CM

## 2019-03-27 NOTE — TELEPHONE ENCOUNTER
----- Message from Padma Guzman LPN sent at 3/27/2019 11:46 AM EDT -----  Regarding: Labs?  Pt was supposed to be scheduled for 6 wk lab appt from 3/4/19. What labs is she supposed to have? CMP d/t lamisil, or TSH?  Please advise.    KD

## 2019-04-15 LAB
ALBUMIN SERPL-MCNC: 4.2 G/DL (ref 3.5–5.2)
ALBUMIN/GLOB SERPL: 1.8 G/DL
ALP SERPL-CCNC: 57 U/L (ref 39–117)
ALT SERPL-CCNC: 22 U/L (ref 1–33)
AST SERPL-CCNC: 24 U/L (ref 1–32)
BILIRUB SERPL-MCNC: 0.3 MG/DL (ref 0.2–1.2)
BUN SERPL-MCNC: 17 MG/DL (ref 8–23)
BUN/CREAT SERPL: 20 (ref 7–25)
CALCIUM SERPL-MCNC: 9 MG/DL (ref 8.6–10.5)
CHLORIDE SERPL-SCNC: 109 MMOL/L (ref 98–107)
CO2 SERPL-SCNC: 28.8 MMOL/L (ref 22–29)
CREAT SERPL-MCNC: 0.85 MG/DL (ref 0.57–1)
GLOBULIN SER CALC-MCNC: 2.3 GM/DL
GLUCOSE SERPL-MCNC: 98 MG/DL (ref 65–99)
POTASSIUM SERPL-SCNC: 4.3 MMOL/L (ref 3.5–5.2)
PROT SERPL-MCNC: 6.5 G/DL (ref 6–8.5)
SODIUM SERPL-SCNC: 148 MMOL/L (ref 136–145)

## 2019-06-03 ENCOUNTER — OFFICE VISIT (OUTPATIENT)
Dept: OBSTETRICS AND GYNECOLOGY | Facility: CLINIC | Age: 64
End: 2019-06-03

## 2019-06-03 ENCOUNTER — PROCEDURE VISIT (OUTPATIENT)
Dept: OBSTETRICS AND GYNECOLOGY | Facility: CLINIC | Age: 64
End: 2019-06-03

## 2019-06-03 ENCOUNTER — APPOINTMENT (OUTPATIENT)
Dept: WOMENS IMAGING | Facility: HOSPITAL | Age: 64
End: 2019-06-03

## 2019-06-03 VITALS
DIASTOLIC BLOOD PRESSURE: 78 MMHG | SYSTOLIC BLOOD PRESSURE: 120 MMHG | WEIGHT: 146 LBS | HEIGHT: 64 IN | BODY MASS INDEX: 24.92 KG/M2

## 2019-06-03 DIAGNOSIS — Z78.0 MENOPAUSE: Primary | ICD-10-CM

## 2019-06-03 DIAGNOSIS — M81.0 AGE-RELATED OSTEOPOROSIS WITHOUT CURRENT PATHOLOGICAL FRACTURE: ICD-10-CM

## 2019-06-03 DIAGNOSIS — Z01.419 ENCOUNTER FOR GYNECOLOGICAL EXAMINATION WITHOUT ABNORMAL FINDING: ICD-10-CM

## 2019-06-03 DIAGNOSIS — Z12.31 VISIT FOR SCREENING MAMMOGRAM: Primary | ICD-10-CM

## 2019-06-03 PROCEDURE — 99397 PER PM REEVAL EST PAT 65+ YR: CPT | Performed by: OBSTETRICS & GYNECOLOGY

## 2019-06-03 PROCEDURE — 77067 SCR MAMMO BI INCL CAD: CPT | Performed by: OBSTETRICS & GYNECOLOGY

## 2019-06-03 PROCEDURE — 77067 SCR MAMMO BI INCL CAD: CPT | Performed by: RADIOLOGY

## 2019-06-03 NOTE — PROGRESS NOTES
Subjective    Julissa Armenta is a 63 y.o. female for annual gyn exam    History of Present Illness   63-year-old postmenopausal black female presents for routine gynecologic exam.  She denies vaginal bleeding or vasomotor symptoms.  She is not using any form of hormone replacement therapy.  She received a mammogram in the office today.  Her bone density assessment last year revealed osteoporosis of the spine.  She has restarted Fosamax and takes vitamin D.  She is current on her screening colonoscopies.  She has no complaints.      The following portions of the patient's history were reviewed and updated as appropriate: allergies, current medications, past family history, past medical history, past social history, past surgical history and problem list.      Review of Systems   Constitutional: Negative for activity change, appetite change, fatigue and unexpected weight change.   HENT: Negative.    Eyes: Negative.    Respiratory: Negative.    Cardiovascular: Negative.    Gastrointestinal: Negative for abdominal distention, abdominal pain, anal bleeding, constipation, diarrhea, nausea and vomiting.   Endocrine: Negative for cold intolerance, heat intolerance, polydipsia, polyphagia and polyuria.   Genitourinary: Negative for difficulty urinating, dysuria, flank pain, frequency, hematuria, pelvic pain, urgency, vaginal bleeding and vaginal discharge.   Musculoskeletal: Negative.    Skin: Negative.    Allergic/Immunologic: Negative.    Neurological: Negative.    Hematological: Negative.    Psychiatric/Behavioral: Negative.            Physical Exam   Constitutional: She is oriented to person, place, and time. Vital signs are normal. She appears well-developed and well-nourished. She is cooperative.   HENT:   Head: Normocephalic.   Eyes: Conjunctivae are normal. Pupils are equal, round, and reactive to light.   Neck: Normal range of motion. Neck supple. No tracheal deviation present. No thyromegaly present.    Cardiovascular: Normal rate, regular rhythm and normal heart sounds. Exam reveals no gallop and no friction rub.   No murmur heard.  Pulmonary/Chest: Effort normal and breath sounds normal. No respiratory distress. She has no wheezes. Right breast exhibits no inverted nipple, no mass, no nipple discharge, no skin change and no tenderness. Left breast exhibits no inverted nipple, no mass, no nipple discharge, no skin change and no tenderness. Breasts are symmetrical. There is no breast swelling.   Abdominal: Soft. Bowel sounds are normal. She exhibits no distension, no ascites and no mass. There is no hepatosplenomegaly. There is no tenderness. There is no rebound, no guarding and no CVA tenderness. No hernia. Hernia confirmed negative in the right inguinal area and confirmed negative in the left inguinal area.   Genitourinary: Rectal exam shows no fissure, no mass, no tenderness and anal tone normal. Pelvic exam was performed with patient supine. No labial fusion. There is no rash, tenderness, lesion or injury on the right labia. There is no rash, tenderness, lesion or injury on the left labia. Uterus is not deviated, not enlarged, not fixed and not tender. Cervix exhibits no motion tenderness, no discharge and no friability. Right adnexum displays no mass, no tenderness and no fullness. Left adnexum displays no mass, no tenderness and no fullness. No erythema, tenderness or bleeding in the vagina. No foreign body in the vagina. No signs of injury around the vagina. No vaginal discharge found.   Musculoskeletal: Normal range of motion. She exhibits no edema or deformity.   Lymphadenopathy:     She has no cervical adenopathy.        Right: No inguinal adenopathy present.        Left: No inguinal adenopathy present.   Neurological: She is alert and oriented to person, place, and time. She has normal reflexes. No cranial nerve deficit. Coordination normal.   Skin: Skin is warm and dry. No rash noted. No erythema.    Psychiatric: She has a normal mood and affect. Her behavior is normal.         Julissa was seen today for gynecologic exam.    Diagnoses and all orders for this visit:    Menopause    Encounter for gynecological examination without abnormal finding  -     Pap IG, Rfx HPV ASCU    Age-related osteoporosis without current pathological fracture    The patient is doing well.  She will continue her Fosamax as well as nutritional supplementation and weightbearing exercise for the osteoporosis.  She will need a follow-up DEXA scan next year.  Annual exams were recommended.

## 2019-06-05 LAB
CONV .: NORMAL
CYTOLOGIST CVX/VAG CYTO: NORMAL
CYTOLOGY CVX/VAG DOC CYTO: NORMAL
CYTOLOGY CVX/VAG DOC THIN PREP: NORMAL
DX ICD CODE: NORMAL
HIV 1 & 2 AB SER-IMP: NORMAL
OTHER STN SPEC: NORMAL
STAT OF ADQ CVX/VAG CYTO-IMP: NORMAL

## 2019-06-17 ENCOUNTER — OFFICE VISIT (OUTPATIENT)
Dept: CARDIOLOGY | Facility: CLINIC | Age: 64
End: 2019-06-17

## 2019-06-17 VITALS
HEIGHT: 64 IN | WEIGHT: 152 LBS | SYSTOLIC BLOOD PRESSURE: 124 MMHG | HEART RATE: 51 BPM | BODY MASS INDEX: 25.95 KG/M2 | DIASTOLIC BLOOD PRESSURE: 84 MMHG | OXYGEN SATURATION: 99 %

## 2019-06-17 DIAGNOSIS — I10 ESSENTIAL HYPERTENSION: Primary | ICD-10-CM

## 2019-06-17 PROCEDURE — 99213 OFFICE O/P EST LOW 20 MIN: CPT | Performed by: NURSE PRACTITIONER

## 2019-06-17 NOTE — PROGRESS NOTES
"[facility]      Patient Name: Julissa Armenta  :1955  Age: 63 y.o.  Primary Cardiologist: Harry Cross MD  Encounter Provider:  GABINO Reed      Chief Complaint:   Chief Complaint   Patient presents with   • Follow-up     6 months         HPI  Julissa Armenta is a 63 y.o. female who presents today for semiannual evaluation. Pt has a  history significant for hypertension.  Patient states that she has done very well over the past 6 months.  She states that since making lifestyle changes her blood pressure has been very well controlled.  She states that she now prepares most of her meals rather than eating out.  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  She reports that blood pressure is very well controlled at home in the 120s/80s.  She denies any chest pain, shortness of breath, palpitations, lightheadedness, swelling, fatigue.      The following portions of the patient's history were reviewed and updated as appropriate: allergies, current medications, past family history, past medical history, past social history, past surgical history and problem list.      Review of Systems   Constitution: Negative for malaise/fatigue.   Cardiovascular: Negative for chest pain and leg swelling.   Respiratory: Negative for shortness of breath.    Neurological: Negative for light-headedness.   All other systems reviewed and are negative.      OBJECTIVE:   Vital Signs  There were no vitals filed for this visit.  Estimated body mass index is 26.09 kg/m² as calculated from the following:    Height as of this encounter: 162.6 cm (64\").    Weight as of this encounter: 68.9 kg (152 lb).    Physical Exam   Constitutional: She is oriented to person, place, and time. Vital signs are normal. She appears well-developed and well-nourished. She is active.   Eyes: Conjunctivae are normal.   Neck: Carotid bruit is not present.   Cardiovascular: Normal rate, " regular rhythm and normal heart sounds.   Pulmonary/Chest: Breath sounds normal.   Abdominal: Normal appearance.   Musculoskeletal:   No cyanosis, clubbing, edema  Normal ROM   Neurological: She is alert and oriented to person, place, and time. GCS eye subscore is 4. GCS verbal subscore is 5. GCS motor subscore is 6.   Skin: Skin is warm, dry and intact.   Psychiatric: She has a normal mood and affect. Her speech is normal and behavior is normal. Judgment and thought content normal. Cognition and memory are normal.       Procedures    Cardiac Procedures:  1. Echocardiogram 11/15/2018.  EF 63%.  Normal LV cavity size.  Mild LVH.  Diastolic function is normal.  Mild thickening of the aortic valve without regurgitation or stenosis.  Trace mitral valve regurgitation.  Trace tricuspid valve regurgitation.  Normal left atrial volume noted.  Saline test results are negative.        ASSESSMENT:      Diagnosis Plan   1. Essential hypertension           PLAN OF CARE:     1. Hypertension.  Patient's blood pressure is very well controlled in the office today at 124/84.  She reports that it is in the 120s/80s at home.  She denies any cardiac symptoms.  She is very active working as a mail deliver with a walking route.  She also exercises routinely in addition.  She has made appropriate lifestyle changes and is not requiring any antihypertensive medications at this time.  We will continue same regimen.  2. Follow-up with Dr. Cross in 1 year.  Sooner for problems or complications.             Discharge Medications           Accurate as of 6/17/19 10:13 AM. If you have any questions, ask your nurse or doctor.               Changes to Medications      Instructions Start Date   aspirin 81 MG EC tablet  What changed:  how much to take   81 mg, Oral, Daily         Continue These Medications      Instructions Start Date   alendronate 70 MG tablet  Commonly known as:  FOSAMAX   70 mg, Oral, Every 7 Days         Stop These Medications     terbinafine 250 MG tablet  Commonly known as:  LAMISIL  Stopped by:  GABINO Reed            Thank you for allowing me to participate in the care of your patient,      Sincerely,   GABINO Reed  [facility]  06/17/19  10:13 AM    **Rhoda Disclaimer:**  Much of this encounter note is an electronic transcription/translation of spoken language to printed text. The electronic translation of spoken language may permit erroneous, or at times, nonsensical words or phrases to be inadvertently transcribed. Although I have reviewed the note for such errors, some may still exist.

## 2019-07-29 ENCOUNTER — HOSPITAL ENCOUNTER (OUTPATIENT)
Dept: ULTRASOUND IMAGING | Facility: HOSPITAL | Age: 64
Discharge: HOME | End: 2019-07-29
Admitting: NURSE PRACTITIONER

## 2019-07-29 DIAGNOSIS — E04.2 MULTIPLE THYROID NODULES: ICD-10-CM

## 2019-07-29 PROCEDURE — 76536 US EXAM OF HEAD AND NECK: CPT

## 2019-11-06 ENCOUNTER — HOSPITAL ENCOUNTER (EMERGENCY)
Facility: HOSPITAL | Age: 64
Discharge: HOME | End: 2019-11-06
Attending: EMERGENCY MEDICINE | Admitting: EMERGENCY MEDICINE

## 2019-11-06 ENCOUNTER — APPOINTMENT (OUTPATIENT)
Dept: GENERAL RADIOLOGY | Facility: HOSPITAL | Age: 64
End: 2019-11-06

## 2019-11-06 VITALS
DIASTOLIC BLOOD PRESSURE: 82 MMHG | OXYGEN SATURATION: 99 % | SYSTOLIC BLOOD PRESSURE: 146 MMHG | WEIGHT: 158 LBS | HEART RATE: 65 BPM | BODY MASS INDEX: 26.98 KG/M2 | HEIGHT: 64 IN | TEMPERATURE: 98.6 F | RESPIRATION RATE: 16 BRPM

## 2019-11-06 DIAGNOSIS — S20.212A CONTUSION OF LEFT CHEST WALL, INITIAL ENCOUNTER: Primary | ICD-10-CM

## 2019-11-06 PROCEDURE — 71101 X-RAY EXAM UNILAT RIBS/CHEST: CPT

## 2019-11-06 PROCEDURE — 99282 EMERGENCY DEPT VISIT SF MDM: CPT

## 2019-11-06 RX ORDER — HYDROCODONE BITARTRATE AND ACETAMINOPHEN 5; 325 MG/1; MG/1
1 TABLET ORAL EVERY 6 HOURS PRN
Qty: 20 TABLET | Refills: 0 | Status: SHIPPED | OUTPATIENT
Start: 2019-11-06 | End: 2020-06-05

## 2019-11-07 NOTE — ED PROVIDER NOTES
EMERGENCY DEPARTMENT ENCOUNTER    Room Number:  26/26  Date of encounter:  11/6/2019  PCP: Swetha Zaragoza APRN  Historian: Patient      HPI:  Chief Complaint: Left chest wall pain  A complete HPI/ROS/PMH/PSH/SH/FH are unobtainable due to: Nothing    Context: Julissa Armenta is a 63 y.o. female who presents to the ED c/o worsening, severe left chest wall pain and bruising.  Patient works at the [employer], and 5 days ago she was working under a desk, and when she stood up she hit the corner of the desk with her left chest wall.  Initially the pain was mild, but as the weeks, she is developed some bruising in that area wrist pain.    She was seen in immediate care center prior to arrival and referred to the ED for further evaluation and treatment      PAST MEDICAL HISTORY  Active Ambulatory Problems     Diagnosis Date Noted   • Menopause 05/14/2018   • Complicated migraine 11/14/2018   • Hypertensive disorder 11/14/2018   • HTN (hypertension) 11/14/2018   • Age-related osteoporosis without current pathological fracture 12/03/2018   • Multiple thyroid nodules 03/04/2019   • Onychomycosis of toenail 03/04/2019     Resolved Ambulatory Problems     Diagnosis Date Noted   • Gait instability 11/14/2018     Past Medical History:   Diagnosis Date   • Disease of thyroid gland    • Goiter    • Hypertension          PAST SURGICAL HISTORY  Past Surgical History:   Procedure Laterality Date   • COLONOSCOPY      2015   • FINGER SURGERY     • THYROID SURGERY           FAMILY HISTORY  Family History   Problem Relation Age of Onset   • Colon cancer Maternal Grandmother    • Hypertension Mother    • Diabetes Mother    • Multiple myeloma Mother    • Hypertension Sister    • Heart disease Sister    • Diabetes Brother    • Hypertension Brother    • Hypertension Sister    • Liver cancer Brother    • Hypertension Brother          SOCIAL HISTORY  Social History     Socioeconomic History   • Marital status: Single     Spouse name: Not  on file   • Number of children: Not on file   • Years of education: Not on file   • Highest education level: Not on file   Occupational History   • Occupation:     Tobacco Use   • Smoking status: Never Smoker   • Smokeless tobacco: Never Used   Substance and Sexual Activity   • Alcohol use: No   • Drug use: No   • Sexual activity: No         ALLERGIES  Cephalexin        REVIEW OF SYSTEMS  Review of Systems     All systems reviewed and negative except for those discussed in HPI.       PHYSICAL EXAM    I have reviewed the triage vital signs and nursing notes.    ED Triage Vitals   Temp Heart Rate Resp BP SpO2   11/06/19 1859 11/06/19 1859 11/06/19 1859 11/06/19 1900 11/06/19 1859   98.6 °F (37 °C) 65 16 146/82 99 %      Temp src Heart Rate Source Patient Position BP Location FiO2 (%)   11/06/19 1859 11/06/19 1859 -- -- --   Tympanic Monitor          Physical Exam  GENERAL: not distressed, but does have pain with certain movements  HENT: nares patent  EYES: no scleral icterus  CV: regular rhythm, regular rate  RESPIRATORY: normal effort, there is ecchymosis along the left lower ribs in the midaxillary line.  There is tenderness to palpation in that area but no crepitance or subcutaneous deformity.  Breath sounds are clear  ABDOMEN: soft, nontender palpation, bowel sounds present  MUSCULOSKELETAL: no deformity  NEURO: alert, moves all extremities, follows commands  SKIN: warm, dry        LAB RESULTS  No results found for this or any previous visit (from the past 24 hour(s)).    Ordered the above labs and independently reviewed the results.        RADIOLOGY  Xr Ribs Left With Pa Chest    Result Date: 11/6/2019  XR RIBS LEFT W PA CHEST-  11/06/2019.  HISTORY: Left rib pain. Patient fell.  FINDINGS: Heart size is at the upper limits of normal. The lungs appear free of acute infiltrates.  No pneumothorax is seen.  Multiple views of the left ribs demonstrate no displaced fractures.      1. Borderline  cardiomegaly. 2. No rib fractures are seen.         I ordered the above noted radiological studies. Reviewed by me and discussed with radiologist.  See dictation for official radiology interpretation.      PROCEDURES    Procedures      MEDICATIONS GIVEN IN ER    Medications - No data to display      PROGRESS, DATA ANALYSIS, CONSULTS, AND MEDICAL DECISION MAKING    All labs have been independently reviewed by me.  All radiology studies have been reviewed by me and discussed with radiologist dictating the report.   EKG's independently viewed and interpreted by me.  Discussion below represents my analysis of pertinent findings related to patient's condition, differential diagnosis, treatment plan and final disposition.        ED Course as of Nov 06 2048   Wed Nov 06, 2019 2047 Left rib series shows no rib fracture, no pneumothorax  [DP]   2047 Bedside ultrasound was performed, and I see no evidence for free fluid in the abdomen, the splenorenal in Morison's pouch's are clear.  [DP]   2047 Although she does have some pain with movement palpation, she is having no respiratory distress and other than being in some pain she is doing very well.  I have written her prescription for some pain medications as needed  [DP]      ED Course User Index  [DP] Pasquale Bowers MD       AS OF 8:48 PM VITALS:    BP - 146/82  HR - 65  TEMP - 98.6 °F (37 °C) (Tympanic)  02 SATS - 99%        DIAGNOSIS  Final diagnoses:   Contusion of left chest wall, initial encounter         DISPOSITION  Discharge           Pasquale Bowers MD  11/06/19 2048

## 2019-12-09 DIAGNOSIS — M81.6 LOCALIZED OSTEOPOROSIS WITHOUT CURRENT PATHOLOGICAL FRACTURE: ICD-10-CM

## 2019-12-09 RX ORDER — ALENDRONATE SODIUM 70 MG/1
TABLET ORAL
Qty: 4 TABLET | Refills: 0 | Status: SHIPPED | OUTPATIENT
Start: 2019-12-09 | End: 2020-06-05

## 2020-06-05 ENCOUNTER — OFFICE VISIT (OUTPATIENT)
Dept: OBSTETRICS AND GYNECOLOGY | Facility: CLINIC | Age: 65
End: 2020-06-05

## 2020-06-05 ENCOUNTER — PROCEDURE VISIT (OUTPATIENT)
Dept: OBSTETRICS AND GYNECOLOGY | Facility: CLINIC | Age: 65
End: 2020-06-05

## 2020-06-05 ENCOUNTER — APPOINTMENT (OUTPATIENT)
Dept: WOMENS IMAGING | Facility: HOSPITAL | Age: 65
End: 2020-06-05

## 2020-06-05 VITALS
SYSTOLIC BLOOD PRESSURE: 119 MMHG | DIASTOLIC BLOOD PRESSURE: 78 MMHG | BODY MASS INDEX: 26.29 KG/M2 | WEIGHT: 154 LBS | HEIGHT: 64 IN

## 2020-06-05 DIAGNOSIS — Z01.419 ENCOUNTER FOR GYNECOLOGICAL EXAMINATION WITHOUT ABNORMAL FINDING: Primary | ICD-10-CM

## 2020-06-05 DIAGNOSIS — Z12.31 VISIT FOR SCREENING MAMMOGRAM: Primary | ICD-10-CM

## 2020-06-05 DIAGNOSIS — I10 ESSENTIAL (PRIMARY) HYPERTENSION: ICD-10-CM

## 2020-06-05 DIAGNOSIS — M81.0 AGE-RELATED OSTEOPOROSIS WITHOUT CURRENT PATHOLOGICAL FRACTURE: ICD-10-CM

## 2020-06-05 DIAGNOSIS — Z78.0 MENOPAUSE: ICD-10-CM

## 2020-06-05 PROCEDURE — 77067 SCR MAMMO BI INCL CAD: CPT | Performed by: RADIOLOGY

## 2020-06-05 PROCEDURE — 77063 BREAST TOMOSYNTHESIS BI: CPT | Performed by: OBSTETRICS & GYNECOLOGY

## 2020-06-05 PROCEDURE — 99397 PER PM REEVAL EST PAT 65+ YR: CPT | Performed by: OBSTETRICS & GYNECOLOGY

## 2020-06-05 PROCEDURE — 77067 SCR MAMMO BI INCL CAD: CPT | Performed by: OBSTETRICS & GYNECOLOGY

## 2020-06-05 PROCEDURE — 77063 BREAST TOMOSYNTHESIS BI: CPT | Performed by: RADIOLOGY

## 2020-06-05 NOTE — PROGRESS NOTES
Subjective    Julissa Armenta is a 64 y.o. female for annual gyn exam    History of Present Illness   64-year-old postmenopausal black female presents for routine gynecologic exam.  She denies vasomotor symptoms or vaginal bleeding.  She does not use hormone replacement therapy.  She is current on her screening mammograms and colonoscopies.  She was diagnosed with mild osteoporosis of the hip 2 years ago and took Fosamax but has now discontinued the medication.  She has no complaints.  She requests routine blood work.      The following portions of the patient's history were reviewed and updated as appropriate: allergies, current medications, past family history, past medical history, past social history, past surgical history and problem list.      Review of Systems   Constitutional: Negative for activity change, appetite change, fatigue and unexpected weight change.   HENT: Negative.    Eyes: Negative.    Respiratory: Negative.    Cardiovascular: Negative.    Gastrointestinal: Negative for abdominal distention, abdominal pain, anal bleeding, constipation, diarrhea, nausea and vomiting.   Endocrine: Negative for cold intolerance, heat intolerance, polydipsia, polyphagia and polyuria.   Genitourinary: Negative for difficulty urinating, dysuria, flank pain, frequency, hematuria, pelvic pain, urgency, vaginal bleeding and vaginal discharge.   Musculoskeletal: Negative.    Skin: Negative.    Allergic/Immunologic: Negative.    Neurological: Negative.    Hematological: Negative.    Psychiatric/Behavioral: Negative.            Physical Exam   Constitutional: She is oriented to person, place, and time. Vital signs are normal. She appears well-developed and well-nourished. She is cooperative.   HENT:   Head: Normocephalic.   Eyes: Pupils are equal, round, and reactive to light. Conjunctivae are normal.   Neck: Normal range of motion. Neck supple. No tracheal deviation present. No thyromegaly present.   Cardiovascular:  Normal rate, regular rhythm and normal heart sounds. Exam reveals no gallop and no friction rub.   No murmur heard.  Pulmonary/Chest: Effort normal and breath sounds normal. No respiratory distress. She has no wheezes. Right breast exhibits no inverted nipple, no mass, no nipple discharge, no skin change and no tenderness. Left breast exhibits no inverted nipple, no mass, no nipple discharge, no skin change and no tenderness. No breast swelling. Breasts are symmetrical.   Abdominal: Soft. Bowel sounds are normal. She exhibits no distension, no ascites and no mass. There is no hepatosplenomegaly. There is no tenderness. There is no rebound, no guarding and no CVA tenderness. No hernia. Hernia confirmed negative in the right inguinal area and confirmed negative in the left inguinal area.   Genitourinary: Rectal exam shows no fissure, no mass, no tenderness and anal tone normal. No breast swelling. Pelvic exam was performed with patient supine. No labial fusion. There is no rash, tenderness, lesion or injury on the right labia. There is no rash, tenderness, lesion or injury on the left labia. Uterus is not deviated, not enlarged, not fixed and not tender. Cervix exhibits no motion tenderness, no discharge and no friability. Right adnexum displays no mass, no tenderness and no fullness. Left adnexum displays no mass, no tenderness and no fullness. No erythema, tenderness or bleeding in the vagina. No foreign body in the vagina. No signs of injury around the vagina. No vaginal discharge found.   Musculoskeletal: Normal range of motion. She exhibits no edema or deformity.   Lymphadenopathy:     She has no cervical adenopathy.        Right: No inguinal adenopathy present.        Left: No inguinal adenopathy present.   Neurological: She is alert and oriented to person, place, and time. She has normal reflexes. No cranial nerve deficit. Coordination normal.   Skin: Skin is warm and dry. No rash noted. No erythema.    Psychiatric: She has a normal mood and affect. Her behavior is normal.         Julissa was seen today for gynecologic exam.    Diagnoses and all orders for this visit:    Encounter for gynecological examination without abnormal finding  -     CBC & Differential  -     Comprehensive Metabolic Panel  -     TSH  -     Pap IG, Rfx HPV ASCU    Menopause    Age-related osteoporosis without current pathological fracture  -     Vitamin D 25 Hydroxy  -     DEXA Bone Density Axial; Future    Essential (primary) hypertension   -     TSH  -     Lipid Panel    The patient is doing well.  She will obtain a bone density assessment at her convenience.  I encourage nutritional supplementation and weightbearing exercise.  Annual mammograms and periodic gynecologic exams were recommended.

## 2020-06-06 LAB
25(OH)D3+25(OH)D2 SERPL-MCNC: 32.2 NG/ML (ref 30–100)
ALBUMIN SERPL-MCNC: 4.3 G/DL (ref 3.5–5.2)
ALBUMIN/GLOB SERPL: 1.6 G/DL
ALP SERPL-CCNC: 51 U/L (ref 39–117)
ALT SERPL-CCNC: 15 U/L (ref 1–33)
AST SERPL-CCNC: 21 U/L (ref 1–32)
BASOPHILS # BLD AUTO: 0.04 10*3/MM3 (ref 0–0.2)
BASOPHILS NFR BLD AUTO: 1 % (ref 0–1.5)
BILIRUB SERPL-MCNC: 0.4 MG/DL (ref 0.2–1.2)
BUN SERPL-MCNC: 16 MG/DL (ref 8–23)
BUN/CREAT SERPL: 19.8 (ref 7–25)
CALCIUM SERPL-MCNC: 9.3 MG/DL (ref 8.6–10.5)
CHLORIDE SERPL-SCNC: 100 MMOL/L (ref 98–107)
CHOLEST SERPL-MCNC: 231 MG/DL (ref 0–200)
CO2 SERPL-SCNC: 26.9 MMOL/L (ref 22–29)
CREAT SERPL-MCNC: 0.81 MG/DL (ref 0.57–1)
EOSINOPHIL # BLD AUTO: 0.07 10*3/MM3 (ref 0–0.4)
EOSINOPHIL NFR BLD AUTO: 1.7 % (ref 0.3–6.2)
ERYTHROCYTE [DISTWIDTH] IN BLOOD BY AUTOMATED COUNT: 13 % (ref 12.3–15.4)
GLOBULIN SER CALC-MCNC: 2.7 GM/DL
GLUCOSE SERPL-MCNC: 97 MG/DL (ref 65–99)
HCT VFR BLD AUTO: 37.7 % (ref 34–46.6)
HDLC SERPL-MCNC: 77 MG/DL (ref 40–60)
HGB BLD-MCNC: 12.4 G/DL (ref 12–15.9)
IMM GRANULOCYTES # BLD AUTO: 0.01 10*3/MM3 (ref 0–0.05)
IMM GRANULOCYTES NFR BLD AUTO: 0.2 % (ref 0–0.5)
LDLC SERPL CALC-MCNC: 145 MG/DL (ref 0–100)
LYMPHOCYTES # BLD AUTO: 1.33 10*3/MM3 (ref 0.7–3.1)
LYMPHOCYTES NFR BLD AUTO: 32.8 % (ref 19.6–45.3)
MCH RBC QN AUTO: 30.5 PG (ref 26.6–33)
MCHC RBC AUTO-ENTMCNC: 32.9 G/DL (ref 31.5–35.7)
MCV RBC AUTO: 92.6 FL (ref 79–97)
MONOCYTES # BLD AUTO: 0.32 10*3/MM3 (ref 0.1–0.9)
MONOCYTES NFR BLD AUTO: 7.9 % (ref 5–12)
NEUTROPHILS # BLD AUTO: 2.28 10*3/MM3 (ref 1.7–7)
NEUTROPHILS NFR BLD AUTO: 56.4 % (ref 42.7–76)
NRBC BLD AUTO-RTO: 0 /100 WBC (ref 0–0.2)
PLATELET # BLD AUTO: 223 10*3/MM3 (ref 140–450)
POTASSIUM SERPL-SCNC: 4.3 MMOL/L (ref 3.5–5.2)
PROT SERPL-MCNC: 7 G/DL (ref 6–8.5)
RBC # BLD AUTO: 4.07 10*6/MM3 (ref 3.77–5.28)
SODIUM SERPL-SCNC: 136 MMOL/L (ref 136–145)
TRIGL SERPL-MCNC: 47 MG/DL (ref 0–150)
TSH SERPL DL<=0.005 MIU/L-ACNC: 1.5 UIU/ML (ref 0.27–4.2)
VLDLC SERPL CALC-MCNC: 9.4 MG/DL (ref 5–40)
WBC # BLD AUTO: 4.05 10*3/MM3 (ref 3.4–10.8)

## 2020-06-11 LAB
CONV .: ABNORMAL
CYTOLOGIST CVX/VAG CYTO: ABNORMAL
CYTOLOGY CVX/VAG DOC CYTO: ABNORMAL
CYTOLOGY CVX/VAG DOC THIN PREP: ABNORMAL
DX ICD CODE: ABNORMAL
DX ICD CODE: ABNORMAL
HIV 1 & 2 AB SER-IMP: ABNORMAL
HPV I/H RISK 1 DNA CVX QL PROBE+SIG AMP: NEGATIVE
OTHER STN SPEC: ABNORMAL
PATHOLOGIST CVX/VAG CYTO: ABNORMAL
RECOM F/U CVX/VAG CYTO: ABNORMAL
STAT OF ADQ CVX/VAG CYTO-IMP: ABNORMAL

## 2020-06-26 ENCOUNTER — TELEPHONE (OUTPATIENT)
Dept: INTERNAL MEDICINE | Age: 65
End: 2020-06-26

## 2020-06-26 DIAGNOSIS — B35.1 ONYCHOMYCOSIS OF TOENAIL: Primary | ICD-10-CM

## 2020-06-26 NOTE — TELEPHONE ENCOUNTER
PATIENT CALLED TO GET A REFERRAL FOR A PODIATRIST DUE TO TOENAIL FUNGUS SHE HAS BEEN SEEN FOR BEFORE.     PLEASE ADVISE    [phone]

## 2020-06-26 NOTE — TELEPHONE ENCOUNTER
Contact patient.     Ok for referral, I will place.   I would like to see her in office sometime soon as it has been over a year.      100

## 2020-07-14 ENCOUNTER — OFFICE VISIT (OUTPATIENT)
Dept: INTERNAL MEDICINE | Age: 65
End: 2020-07-14

## 2020-07-14 VITALS
WEIGHT: 156 LBS | HEIGHT: 64 IN | HEART RATE: 74 BPM | OXYGEN SATURATION: 98 % | BODY MASS INDEX: 26.63 KG/M2 | TEMPERATURE: 96.4 F | DIASTOLIC BLOOD PRESSURE: 80 MMHG | SYSTOLIC BLOOD PRESSURE: 124 MMHG

## 2020-07-14 DIAGNOSIS — E78.5 HYPERLIPIDEMIA, UNSPECIFIED HYPERLIPIDEMIA TYPE: ICD-10-CM

## 2020-07-14 DIAGNOSIS — I10 ESSENTIAL HYPERTENSION: Primary | ICD-10-CM

## 2020-07-14 DIAGNOSIS — E04.2 MULTIPLE THYROID NODULES: ICD-10-CM

## 2020-07-14 DIAGNOSIS — M81.0 AGE-RELATED OSTEOPOROSIS WITHOUT CURRENT PATHOLOGICAL FRACTURE: ICD-10-CM

## 2020-07-14 PROCEDURE — 99214 OFFICE O/P EST MOD 30 MIN: CPT | Performed by: NURSE PRACTITIONER

## 2020-07-14 RX ORDER — MULTIVIT WITH MINERALS/LUTEIN
1000 TABLET ORAL DAILY
COMMUNITY

## 2020-07-14 RX ORDER — FAMOTIDINE 20 MG
2000 TABLET ORAL
COMMUNITY
End: 2023-01-12 | Stop reason: SDUPTHER

## 2020-07-14 NOTE — PROGRESS NOTES
[facility]  Swetha CARLISLE Black / 64 y.o. / female  07/14/2020      ASSESSMENT & PLAN:    Problem List Items Addressed This Visit        Cardiovascular and Mediastinum    HTN (hypertension) - Primary    Hyperlipidemia       Endocrine    Multiple thyroid nodules    Relevant Orders    US Thyroid       Musculoskeletal and Integument    Age-related osteoporosis without current pathological fracture        Orders Placed This Encounter   Procedures   • US Thyroid     No orders of the defined types were placed in this encounter.      Summary/Discussion:    1. Essential hypertension  Stable off of medication.  Continue to monitor at home and intermittent basis and follow-up with me if elevated greater than 130/80 consistently.      2. Multiple thyroid nodules    - US Thyroid    3. Age-related osteoporosis without current pathological fracture  Continue calcium and vitamin D.  Has DEXA scan scheduled per gynecology next week.    4. Hyperlipidemia, unspecified hyperlipidemia type  Your 10-year ASCVD risk (risk of heart attack,stroke, or peripheral vascular disease in the next 10 years) at this time is 8.4%. She has had some recent dietary indiscretions to account for this and reports she will reduce cholesterol in her diet. Return to office 6 months for re-evaluation and repeat fasting lab.       Return in about 6 months (around 1/14/2021) for Next scheduled follow up with fasting labs .  ____________________________________________________________________    MEDICATIONS  Current Outpatient Medications   Medication Sig Dispense Refill   • ascorbic acid (VITAMIN C) 1000 MG tablet Take 1,000 mg by mouth Daily.     • aspirin 81 MG EC tablet Take 1 tablet by mouth Daily. (Patient taking differently: Take  by mouth Daily.)     • [facility] POTASSIUM GLUCONATE PO Take  by mouth.     • Misc Natural Products (EQ GLUCOSAMINE-CHONDROITIN-MSM PO) Take  by mouth.     • Multiple Vitamin (MULTI-DAY VITAMINS PO)  "Take  by mouth.     • Vitamin D, Cholecalciferol, 25 MCG (1000 UT) capsule Take  by mouth.       No current facility-administered medications for this visit.        VITALS    Visit Vitals  /80   Pulse 74   Temp 96.4 °F (35.8 °C) (Temporal)   Ht 162.6 cm (64.02\")   Wt 70.8 kg (156 lb)   SpO2 98%   BMI 26.76 kg/m²       BP Readings from Last 3 Encounters:   07/14/20 124/80   06/05/20 119/78   11/06/19 146/82     Wt Readings from Last 3 Encounters:   07/14/20 70.8 kg (156 lb)   06/05/20 69.9 kg (154 lb)   11/06/19 71.7 kg (158 lb)      Body mass index is 26.76 kg/m².    CC:  Main reason(s) for today's visit: Follow-up for hypertension     HPI:   Chronic essential hypertension:  Since prior visit: does not check blood pressure at home. Currently taking Off of medication since 2018. She is not exercising, but is adherent to low sodium diet. BP readings at home are stable. She denies symptoms of chest pain/pressure, dyspnea, swelling, vision impairment. CVD risk factors include: advanced age (>55 for males, >65 for females), dyslipidemia, HTN, obesity (BMI>=30 kg/m2), and sedentary lifestyle. Use of agents associated with HTN: no alcohol use and no tobacco use . Most recent in-office blood pressure readings:   BP Readings from Last 3 Encounters:   07/14/20 124/80   06/05/20 119/78   11/06/19 146/82       Chronic hyperlipidemia:  Current therapy include no prior medication.  Reports has been eating eggs more frequently in the recent few months.   Most recent labs:   Lab Results   Component Value Date     (H) 06/05/2020    LDL 99 11/15/2018     (H) 05/14/2018    HDL 77 (H) 06/05/2020    HDL 75 (H) 11/15/2018    HDL 84 (H) 05/14/2018    TRIG 47 06/05/2020          Patient Care Team:  Swetha Zaragoza APRN as PCP - General (Internal Medicine)  ____________________________________________________________________      REVIEW OF SYSTEMS    Review of Systems   Constitutional: Negative for activity change, " appetite change and unexpected weight change.   HENT: Negative for tinnitus.    Eyes: Negative for visual disturbance.   Respiratory: Negative for cough, chest tightness and shortness of breath.    Cardiovascular: Negative for chest pain, palpitations and leg swelling.   Neurological: Negative for dizziness, light-headedness and headaches.         PHYSICAL EXAMINATION    Physical Exam   Constitutional: She is oriented to person, place, and time. Vital signs are normal. She appears well-developed and well-nourished. She is cooperative. She does not appear ill. No distress.   Cardiovascular: Normal rate, regular rhythm, S1 normal, S2 normal and normal heart sounds.   No murmur heard.  Pulmonary/Chest: Effort normal and breath sounds normal. She has no decreased breath sounds. She has no wheezes. She has no rhonchi. She has no rales.   Neurological: She is alert and oriented to person, place, and time.   Skin: Skin is warm, dry and intact.   Psychiatric: She has a normal mood and affect. Her speech is normal and behavior is normal. Judgment and thought content normal. Cognition and memory are normal.   Nursing note and vitals reviewed.      REVIEWED DATA:    Labs:   Lab Results   Component Value Date     06/05/2020    K 4.3 06/05/2020    AST 21 06/05/2020    ALT 15 06/05/2020    BUN 16 06/05/2020    CREATININE 0.81 06/05/2020    CREATININE 0.85 04/15/2019    CREATININE 0.80 03/04/2019    EGFRIFNONA 71 06/05/2020    EGFRIFAFRI 86 06/05/2020       Lab Results   Component Value Date    HGBA1C 5.60 11/15/2018    HGBA1C 5.80 (H) 03/27/2017    GLUCOSE 110 (H) 11/14/2018       Lab Results   Component Value Date     (H) 06/05/2020    LDL 99 11/15/2018     (H) 05/14/2018    HDL 77 (H) 06/05/2020    HDL 75 (H) 11/15/2018    HDL 84 (H) 05/14/2018    TRIG 47 06/05/2020    TRIG 44 11/15/2018    TRIG 52 05/14/2018       Lab Results   Component Value Date    TSH 1.500 06/05/2020          Lab Results   Component  Value Date    WBC 4.05 06/05/2020    HGB 12.4 06/05/2020    HGB 12.8 11/14/2018    HGB 13.5 05/14/2018     06/05/2020       No results found for: PROTEIN, GLUCOSEU, BLOODU, NITRITEU, LEUKOCYTESUR    Imaging:        Medical Tests:        Summary of old records / correspondence / consultant report:        Request outside records:        ALLERGIES  Allergies   Allergen Reactions   • Cephalexin Rash        PFSH:     The following portions of the patient's history were reviewed and updated as appropriate: Allergies / Current Medications / Past Medical History / Surgical History / Social History / Family History    PROBLEM LIST   Patient Active Problem List   Diagnosis   • Menopause   • Complicated migraine   • Hypertensive disorder   • HTN (hypertension)   • Age-related osteoporosis without current pathological fracture   • Multiple thyroid nodules   • Onychomycosis of toenail   • Hyperlipidemia       PAST MEDICAL HISTORY  Past Medical History:   Diagnosis Date   • Disease of thyroid gland    • Goiter    • Hypertension        SURGICAL HISTORY  Past Surgical History:   Procedure Laterality Date   • COLONOSCOPY      2015   • FINGER SURGERY     • THYROID SURGERY         SOCIAL HISTORY  Social History     Socioeconomic History   • Marital status: Single     Spouse name: Not on file   • Number of children: Not on file   • Years of education: Not on file   • Highest education level: Not on file   Occupational History   • Occupation:     Tobacco Use   • Smoking status: Never Smoker   • Smokeless tobacco: Never Used   Substance and Sexual Activity   • Alcohol use: No   • Drug use: No   • Sexual activity: Never       FAMILY HISTORY  Family History   Problem Relation Age of Onset   • Colon cancer Maternal Grandmother    • Hypertension Mother    • Diabetes Mother    • Multiple myeloma Mother    • Hypertension Sister    • Heart disease Sister    • Diabetes Brother    • Hypertension Brother    • Hypertension Sister     • Liver cancer Brother    • Hypertension Brother

## 2020-07-14 NOTE — PATIENT INSTRUCTIONS
"High Cholesterol    High cholesterol is a condition in which the blood has high levels of a white, waxy, fat-like substance (cholesterol). The human body needs small amounts of cholesterol. The liver makes all the cholesterol that the body needs. Extra (excess) cholesterol comes from the food that we eat.  Cholesterol is carried from the liver by the blood through the blood vessels. If you have high cholesterol, deposits (plaques) may build up on the walls of your blood vessels (arteries). Plaques make the arteries narrower and stiffer. Cholesterol plaques increase your risk for heart attack and stroke. Work with your health care provider to keep your cholesterol levels in a healthy range.  What increases the risk?  This condition is more likely to develop in people who:  · Eat foods that are high in animal fat (saturated fat) or cholesterol.  · Are overweight.  · Are not getting enough exercise.  · Have a family history of high cholesterol.  What are the signs or symptoms?  There are no symptoms of this condition.  How is this diagnosed?  This condition may be diagnosed from the results of a blood test.  · If you are older than age 20, your health care provider may check your cholesterol every 4-6 years.  · You may be checked more often if you already have high cholesterol or other risk factors for heart disease.  The blood test for cholesterol measures:  · \"Bad\" cholesterol (LDL cholesterol). This is the main type of cholesterol that causes heart disease. The desired level for LDL is less than 100.  · \"Good\" cholesterol (HDL cholesterol). This type helps to protect against heart disease by cleaning the arteries and carrying the LDL away. The desired level for HDL is 60 or higher.  · Triglycerides. These are fats that the body can store or burn for energy. The desired number for triglycerides is lower than 150.  · Total cholesterol. This is a measure of the total amount of cholesterol in your blood, including LDL " cholesterol, HDL cholesterol, and triglycerides. A healthy number is less than 200.  How is this treated?  This condition is treated with diet changes, lifestyle changes, and medicines.  Diet changes  · This may include eating more whole grains, fruits, vegetables, nuts, and fish.  · This may also include cutting back on red meat and foods that have a lot of added sugar.  Lifestyle changes  · Changes may include getting at least 40 minutes of aerobic exercise 3 times a week. Aerobic exercises include walking, biking, and swimming. Aerobic exercise along with a healthy diet can help you maintain a healthy weight.  · Changes may also include quitting smoking.  Medicines  · Medicines are usually given if diet and lifestyle changes have failed to reduce your cholesterol to healthy levels.  · Your health care provider may prescribe a statin medicine. Statin medicines have been shown to reduce cholesterol, which can reduce the risk of heart disease.  Follow these instructions at home:  Eating and drinking  If told by your health care provider:  · Eat chicken (without skin), fish, veal, shellfish, ground turkey breast, and round or loin cuts of red meat.  · Do not eat fried foods or fatty meats, such as hot dogs and salami.  · Eat plenty of fruits, such as apples.  · Eat plenty of vegetables, such as broccoli, potatoes, and carrots.  · Eat beans, peas, and lentils.  · Eat grains such as barley, rice, couscous, and bulgur wheat.  · Eat pasta without cream sauces.  · Use skim or nonfat milk, and eat low-fat or nonfat yogurt and cheeses.  · Do not eat or drink whole milk, cream, ice cream, egg yolks, or hard cheeses.  · Do not eat stick margarine or tub margarines that contain trans fats (also called partially hydrogenated oils).  · Do not eat saturated tropical oils, such as coconut oil and palm oil.  · Do not eat cakes, cookies, crackers, or other baked goods that contain trans fats.    General instructions  · Exercise as  directed by your health care provider. Increase your activity level with activities such as gardening, walking, and taking the stairs.  · Take over-the-counter and prescription medicines only as told by your health care provider.  · Do not use any products that contain nicotine or tobacco, such as cigarettes and e-cigarettes. If you need help quitting, ask your health care provider.  · Keep all follow-up visits as told by your health care provider. This is important.  Contact a health care provider if:  · You are struggling to maintain a healthy diet or weight.  · You need help to start on an exercise program.  · You need help to stop smoking.  Get help right away if:  · You have chest pain.  · You have trouble breathing.  This information is not intended to replace advice given to you by your health care provider. Make sure you discuss any questions you have with your health care provider.  Document Released: 12/18/2006 Document Revised: 12/21/2018 Document Reviewed: 06/17/2017  Elsevier Patient Education © 2020 [employer]

## 2020-07-17 ENCOUNTER — OFFICE VISIT (OUTPATIENT)
Dept: CARDIOLOGY | Facility: CLINIC | Age: 65
End: 2020-07-17

## 2020-07-17 VITALS
SYSTOLIC BLOOD PRESSURE: 132 MMHG | OXYGEN SATURATION: 98 % | HEIGHT: 64 IN | HEART RATE: 67 BPM | DIASTOLIC BLOOD PRESSURE: 80 MMHG | BODY MASS INDEX: 26.63 KG/M2 | WEIGHT: 156 LBS

## 2020-07-17 DIAGNOSIS — I10 ESSENTIAL HYPERTENSION: Primary | ICD-10-CM

## 2020-07-17 PROCEDURE — 93000 ELECTROCARDIOGRAM COMPLETE: CPT | Performed by: INTERNAL MEDICINE

## 2020-07-17 PROCEDURE — 99212 OFFICE O/P EST SF 10 MIN: CPT | Performed by: INTERNAL MEDICINE

## 2020-07-17 NOTE — PROGRESS NOTES
Subjective:     Encounter Date:07/17/2020      Patient ID: Julissa Armenta is a 64 y.o. female.    Chief Complaint:  Hypertension   This is a chronic problem. The problem is controlled.       64-year-old -American female who presents today for reevaluation.  She continues to do well no chest discomfort.  Blood pressures well controlled.  She does occasionally have her heart skip a beat she said this is been going on for many many years and is very consistent with a PVC by her description.  Again she is able to do what she wants to do with no symptoms.  Review of Systems   All other systems reviewed and are negative.        ECG 12 Lead  Date/Time: 7/17/2020 1:30 PM  Performed by: Harry Cross MD  Authorized by: Harry Cross MD   Comparison: compared with previous ECG from 11/14/2018  Similar to previous ECG  Rhythm: sinus rhythm  Other findings: non-specific ST-T wave changes    Clinical impression: non-specific ECG               Objective:     Physical Exam   Constitutional: She is oriented to person, place, and time. She appears well-developed.   HENT:   Head: Normocephalic.   Eyes: Conjunctivae are normal.   Neck: Normal range of motion.   Cardiovascular: Normal rate, regular rhythm and normal heart sounds.   Pulmonary/Chest: Breath sounds normal.   Abdominal: Soft. Bowel sounds are normal.   Musculoskeletal: Normal range of motion. She exhibits no edema.   Neurological: She is alert and oriented to person, place, and time.   Skin: Skin is warm and dry.   Psychiatric: She has a normal mood and affect. Her behavior is normal.   Vitals reviewed.      Lab Review:       Assessment:          Diagnosis Plan   1. Essential hypertension            Plan:       1.  Hypertension blood pressures good  2.  Palpitations she classically describes a PVC.  At this point I would not do anything about she is had it for years and has not changed.  3.  At this point patient stable from a cardiovascular  standpoint I will refer her back to her primary care provider for further management and have her follow-up on an as-needed basis.

## 2020-07-20 ENCOUNTER — HOSPITAL ENCOUNTER (OUTPATIENT)
Dept: ULTRASOUND IMAGING | Facility: HOSPITAL | Age: 65
Discharge: HOME | End: 2020-07-20
Admitting: NURSE PRACTITIONER

## 2020-07-20 PROCEDURE — 76536 US EXAM OF HEAD AND NECK: CPT

## 2020-07-21 DIAGNOSIS — E04.2 MULTIPLE THYROID NODULES: Primary | ICD-10-CM

## 2020-07-22 ENCOUNTER — HOSPITAL ENCOUNTER (OUTPATIENT)
Dept: BONE DENSITY | Facility: HOSPITAL | Age: 65
Discharge: HOME | End: 2020-07-22
Admitting: OBSTETRICS & GYNECOLOGY

## 2020-07-22 DIAGNOSIS — M81.0 AGE-RELATED OSTEOPOROSIS WITHOUT CURRENT PATHOLOGICAL FRACTURE: ICD-10-CM

## 2020-07-22 PROCEDURE — 77080 DXA BONE DENSITY AXIAL: CPT

## 2021-01-14 ENCOUNTER — OFFICE VISIT (OUTPATIENT)
Dept: INTERNAL MEDICINE | Age: 66
End: 2021-01-14

## 2021-01-14 VITALS
DIASTOLIC BLOOD PRESSURE: 82 MMHG | OXYGEN SATURATION: 99 % | BODY MASS INDEX: 27.83 KG/M2 | WEIGHT: 163 LBS | HEIGHT: 64 IN | HEART RATE: 69 BPM | SYSTOLIC BLOOD PRESSURE: 126 MMHG | TEMPERATURE: 96.6 F

## 2021-01-14 DIAGNOSIS — E78.5 HYPERLIPIDEMIA, UNSPECIFIED HYPERLIPIDEMIA TYPE: ICD-10-CM

## 2021-01-14 DIAGNOSIS — M25.512 CHRONIC LEFT SHOULDER PAIN: ICD-10-CM

## 2021-01-14 DIAGNOSIS — Z11.59 NEED FOR HEPATITIS C SCREENING TEST: ICD-10-CM

## 2021-01-14 DIAGNOSIS — I10 ESSENTIAL HYPERTENSION: Primary | ICD-10-CM

## 2021-01-14 DIAGNOSIS — G89.29 CHRONIC LEFT SHOULDER PAIN: ICD-10-CM

## 2021-01-14 DIAGNOSIS — M81.0 AGE-RELATED OSTEOPOROSIS WITHOUT CURRENT PATHOLOGICAL FRACTURE: ICD-10-CM

## 2021-01-14 DIAGNOSIS — E04.2 MULTIPLE THYROID NODULES: ICD-10-CM

## 2021-01-14 PROCEDURE — 99214 OFFICE O/P EST MOD 30 MIN: CPT | Performed by: NURSE PRACTITIONER

## 2021-01-14 NOTE — PROGRESS NOTES
[facility] [place] INTERNAL MEDICINE  Swetha CARLISLE Black / 65 y.o. / female  01/14/2021      ASSESSMENT & PLAN:    Problem List Items Addressed This Visit        Cardiac and Vasculature    HTN (hypertension) - Primary    Relevant Orders    Comprehensive Metabolic Panel    Hyperlipidemia    Relevant Orders    Lipid Panel With / Chol / HDL Ratio       Endocrine and Metabolic    Multiple thyroid nodules       Musculoskeletal and Injuries    Age-related osteoporosis without current pathological fracture      Other Visit Diagnoses     Need for hepatitis C screening test        Relevant Orders    Hepatitis C Antibody    Chronic left shoulder pain        Relevant Medications    Diclofenac Sodium (VOLTAREN) 1 % gel gel        Orders Placed This Encounter   Procedures   • Comprehensive Metabolic Panel   • Lipid Panel With / Chol / HDL Ratio   • Hepatitis C Antibody     New Medications Ordered This Visit   Medications   • Diclofenac Sodium (VOLTAREN) 1 % gel gel     Sig: Apply 4 g topically to the appropriate area as directed 4 (Four) Times a Day As Needed (pain).     Dispense:  100 g     Refill:  0       Summary/Discussion:    1. Essential hypertension  Blood pressure stable on current therapy, continue same.  Check labs today. Continue monitoring at home, notify me if consistently higher than 130/80.     - Comprehensive Metabolic Panel    2. Hyperlipidemia, unspecified hyperlipidemia type  Check fasting lipid panel today.  Adjustment of medication as necessary.  Continue to follow and improve on low-fat, low carbohydrate diet.     - Lipid Panel With / Chol / HDL Ratio    3. Age-related osteoporosis without current pathological fracture  DEXA scan UTD, osteopenia noted.     4. Multiple thyroid nodules  US due July for follow up.     5. Need for hepatitis C screening test    - Hepatitis C Antibody    6. Chronic left shoulder pain  Patient defers any imaging studies at this time.  Suspect likely arthritis related to  "long-term wear and tear as she worked as a  before she retired.  She can use topical diclofenac to area as needed for pain, Tylenol p.o. as needed.    - Diclofenac Sodium (VOLTAREN) 1 % gel gel; Apply 4 g topically to the appropriate area as directed 4 (Four) Times a Day As Needed (pain).  Dispense: 100 g; Refill: 0        Return in about 6 months (around 7/14/2021) for Next scheduled follow up.  ____________________________________________________________________    MEDICATIONS  Current Outpatient Medications   Medication Sig Dispense Refill   • ascorbic acid (VITAMIN C) 1000 MG tablet Take 1,000 mg by mouth Daily.     • aspirin 81 MG EC tablet Take 1 tablet by mouth Daily. (Patient taking differently: Take  by mouth Daily.)     • [employer] POTASSIUM GLUCONATE PO Take  by mouth.     • Misc Natural Products (EQ GLUCOSAMINE-CHONDROITIN-MSM PO) Take  by mouth.     • Multiple Vitamin (MULTI-DAY VITAMINS PO) Take  by mouth.     • Vitamin D, Cholecalciferol, 25 MCG (1000 UT) capsule Take  by mouth.     • Diclofenac Sodium (VOLTAREN) 1 % gel gel Apply 4 g topically to the appropriate area as directed 4 (Four) Times a Day As Needed (pain). 100 g 0     No current facility-administered medications for this visit.        VITALS    Visit Vitals  /82   Pulse 69   Temp 96.6 °F (35.9 °C) (Temporal)   Ht 162.6 cm (64.02\")   Wt 73.9 kg (163 lb)   SpO2 99%   BMI 27.96 kg/m²       BP Readings from Last 3 Encounters:   01/14/21 126/82   07/17/20 132/80   07/14/20 124/80     Wt Readings from Last 3 Encounters:   01/14/21 73.9 kg (163 lb)   07/17/20 70.8 kg (156 lb)   07/14/20 70.8 kg (156 lb)      Body mass index is 27.96 kg/m².    CC:  Main reason(s) for today's visit: Follow-up for hypertension and hyperlipidemia    HPI:   Chronic essential hypertension:  Since prior visit: checks blood pressure occasionally and < 140. Currently taking nothing. She is not exercising, but is adherent to low sodium diet. BP readings at home " are stable. She denies symptoms of chest pain/pressure, dyspnea, swelling, vision impairment. CVD risk factors include: advanced age (>55 for males, >65 for females), dyslipidemia, HTN.  Most recent in-office blood pressure readings:     BP Readings from Last 3 Encounters:   01/14/21 126/82   07/17/20 132/80   07/14/20 124/80       Chronic hyperlipidemia:  Current therapy include no medication (previously deferred).    Most recent labs:   Lab Results   Component Value Date     (H) 06/05/2020    LDL 99 11/15/2018     (H) 05/14/2018    HDL 77 (H) 06/05/2020    HDL 75 (H) 11/15/2018    HDL 84 (H) 05/14/2018    TRIG 47 06/05/2020      Shoulder Pain: Patient complaints of left shoulder pain. This is evaluated as a personal injury. The pain is described as aching.  The onset of the pain was intermittent.   Location is anterior, lateral. No history of dislocation. Symptoms are aggravated by repetitive use. Symptoms are diminished by  rest.   Limited activities include: reaching. Patient is retired .          Patient Care Team:  Swetha Zaragoza APRN as PCP - General (Internal Medicine)  ____________________________________________________________________      REVIEW OF SYSTEMS    Review of Systems   Constitutional: Negative for activity change, appetite change and unexpected weight change.   HENT: Negative for tinnitus.    Eyes: Negative for visual disturbance.   Respiratory: Negative for cough, chest tightness and shortness of breath.    Cardiovascular: Negative for chest pain, palpitations and leg swelling.   Musculoskeletal: Positive for arthralgias.   Neurological: Negative for dizziness, light-headedness and headaches.         PHYSICAL EXAMINATION    Physical Exam  Vitals signs and nursing note reviewed.   Constitutional:       General: She is not in acute distress.     Appearance: She is well-developed. She is not ill-appearing.   Cardiovascular:      Rate and Rhythm: Normal rate and  regular rhythm.      Heart sounds: Normal heart sounds, S1 normal and S2 normal. No murmur.   Pulmonary:      Effort: Pulmonary effort is normal.      Breath sounds: Normal breath sounds. No decreased breath sounds, wheezing, rhonchi or rales.   Musculoskeletal:      Left shoulder: She exhibits pain. She exhibits normal range of motion, no tenderness and no bony tenderness.   Skin:     General: Skin is warm and dry.   Neurological:      Mental Status: She is alert and oriented to person, place, and time.   Psychiatric:         Speech: Speech normal.         Behavior: Behavior normal. Behavior is cooperative.         Thought Content: Thought content normal.         Judgment: Judgment normal.         REVIEWED DATA:    Labs:   Lab Results   Component Value Date     06/05/2020    K 4.3 06/05/2020    AST 21 06/05/2020    ALT 15 06/05/2020    BUN 16 06/05/2020    CREATININE 0.81 06/05/2020    CREATININE 0.85 04/15/2019    CREATININE 0.80 03/04/2019    EGFRIFNONA 71 06/05/2020    EGFRIFAFRI 86 06/05/2020       Lab Results   Component Value Date    HGBA1C 5.60 11/15/2018    HGBA1C 5.80 (H) 03/27/2017    GLUCOSE 110 (H) 11/14/2018       Lab Results   Component Value Date     (H) 06/05/2020    LDL 99 11/15/2018     (H) 05/14/2018    HDL 77 (H) 06/05/2020    HDL 75 (H) 11/15/2018    HDL 84 (H) 05/14/2018    TRIG 47 06/05/2020    TRIG 44 11/15/2018    TRIG 52 05/14/2018       Lab Results   Component Value Date    TSH 1.500 06/05/2020          Lab Results   Component Value Date    WBC 4.05 06/05/2020    HGB 12.4 06/05/2020    HGB 12.8 11/14/2018    HGB 13.5 05/14/2018     06/05/2020       No results found for: PROTEIN, GLUCOSEU, BLOODU, NITRITEU, LEUKOCYTESUR    Imaging:        Medical Tests:        Summary of old records / correspondence / consultant report:        Request outside records:        ALLERGIES  Allergies   Allergen Reactions   • Cephalexin Rash        PFSH:     The following portions of  the patient's history were reviewed and updated as appropriate: Allergies / Current Medications / Past Medical History / Surgical History / Social History / Family History    PROBLEM LIST   Patient Active Problem List   Diagnosis   • Menopause   • Complicated migraine   • Hypertensive disorder   • HTN (hypertension)   • Age-related osteoporosis without current pathological fracture   • Multiple thyroid nodules   • Onychomycosis of toenail   • Hyperlipidemia       PAST MEDICAL HISTORY  Past Medical History:   Diagnosis Date   • Disease of thyroid gland    • Goiter    • Hypertension        SURGICAL HISTORY  Past Surgical History:   Procedure Laterality Date   • COLONOSCOPY      2015   • FINGER SURGERY     • THYROID SURGERY         SOCIAL HISTORY  Social History     Socioeconomic History   • Marital status: Single     Spouse name: Not on file   • Number of children: Not on file   • Years of education: Not on file   • Highest education level: Not on file   Occupational History   • Occupation:     Tobacco Use   • Smoking status: Never Smoker   • Smokeless tobacco: Never Used   Substance and Sexual Activity   • Alcohol use: No   • Drug use: No   • Sexual activity: Never       FAMILY HISTORY  Family History   Problem Relation Age of Onset   • Colon cancer Maternal Grandmother    • Hypertension Mother    • Diabetes Mother    • Multiple myeloma Mother    • Hypertension Sister    • Heart disease Sister    • Diabetes Brother    • Hypertension Brother    • Hypertension Sister    • Liver cancer Brother    • Hypertension Brother

## 2021-01-14 NOTE — PATIENT INSTRUCTIONS
"Osteoarthritis    Osteoarthritis is a type of arthritis that affects tissue that covers the ends of bones in joints (cartilage). Cartilage acts as a cushion between the bones and helps them move smoothly. Osteoarthritis results when cartilage in the joints gets worn down. Osteoarthritis is sometimes called \"wear and tear\" arthritis.  Osteoarthritis is the most common form of arthritis. It often occurs in older people. It is a condition that gets worse over time (a progressive condition). Joints that are most often affected by this condition are in:  · Fingers.  · Toes.  · Hips.  · Knees.  · Spine, including neck and lower back.  What are the causes?  This condition is caused by age-related wearing down of cartilage that covers the ends of bones.  What increases the risk?  The following factors may make you more likely to develop this condition:  · Older age.  · Being overweight or obese.  · Overuse of joints, such as in athletes.  · Past injury of a joint.  · Past surgery on a joint.  · Family history of osteoarthritis.  What are the signs or symptoms?  The main symptoms of this condition are pain, swelling, and stiffness in the joint. The joint may lose its shape over time. Small pieces of bone or cartilage may break off and float inside of the joint, which may cause more pain and damage to the joint. Small deposits of bone (osteophytes) may grow on the edges of the joint. Other symptoms may include:  · A grating or scraping feeling inside the joint when you move it.  · Popping or creaking sounds when you move.  Symptoms may affect one or more joints. Osteoarthritis in a major joint, such as your knee or hip, can make it painful to walk or exercise. If you have osteoarthritis in your hands, you might not be able to  items, twist your hand, or control small movements of your hands and fingers (fine motor skills).  How is this diagnosed?  This condition may be diagnosed based on:  · Your medical history.  · A " physical exam.  · Your symptoms.  · X-rays of the affected joint(s).  · Blood tests to rule out other types of arthritis.  How is this treated?  There is no cure for this condition, but treatment can help to control pain and improve joint function. Treatment plans may include:  · A prescribed exercise program that allows for rest and joint relief. You may work with a physical therapist.  · A weight control plan.  · Pain relief techniques, such as:  ? Applying heat and cold to the joint.  ? Electric pulses delivered to nerve endings under the skin (transcutaneous electrical nerve stimulation, or TENS).  ? Massage.  ? Certain nutritional supplements.  · NSAIDs or prescription medicines to help relieve pain.  · Medicine to help relieve pain and inflammation (corticosteroids). This can be given by mouth (orally) or as an injection.  · Assistive devices, such as a brace, wrap, splint, specialized glove, or cane.  · Surgery, such as:  ? An osteotomy. This is done to reposition the bones and relieve pain or to remove loose pieces of bone and cartilage.  ? Joint replacement surgery. You may need this surgery if you have very bad (advanced) osteoarthritis.  Follow these instructions at home:  Activity  · Rest your affected joints as directed by your health care provider.  · Do not drive or use heavy machinery while taking prescription pain medicine.  · Exercise as directed. Your health care provider or physical therapist may recommend specific types of exercise, such as:  ? Strengthening exercises. These are done to strengthen the muscles that support joints that are affected by arthritis. They can be performed with weights or with exercise bands to add resistance.  ? Aerobic activities. These are exercises, such as brisk walking or water aerobics, that get your heart pumping.  ? Range-of-motion activities. These keep your joints easy to move.  ? Balance and agility exercises.  Managing pain, stiffness, and swelling          · If directed, apply heat to the affected area as often as told by your health care provider. Use the heat source that your health care provider recommends, such as a moist heat pack or a heating pad.  ? If you have a removable assistive device, remove it as told by your health care provider.  ? Place a towel between your skin and the heat source. If your health care provider tells you to keep the assistive device on while you apply heat, place a towel between the assistive device and the heat source.  ? Leave the heat on for 20-30 minutes.  ? Remove the heat if your skin turns bright red. This is especially important if you are unable to feel pain, heat, or cold. You may have a greater risk of getting burned.  · If directed, put ice on the affected joint:  ? If you have a removable assistive device, remove it as told by your health care provider.  ? Put ice in a plastic bag.  ? Place a towel between your skin and the bag. If your health care provider tells you to keep the assistive device on during icing, place a towel between the assistive device and the bag.  ? Leave the ice on for 20 minutes, 2-3 times a day.  General instructions  · Take over-the-counter and prescription medicines only as told by your health care provider.  · Maintain a healthy weight. Follow instructions from your health care provider for weight control. These may include dietary restrictions.  · Do not use any products that contain nicotine or tobacco, such as cigarettes and e-cigarettes. These can delay bone healing. If you need help quitting, ask your health care provider.  · Use assistive devices as directed by your health care provider.  · Keep all follow-up visits as told by your health care provider. This is important.  Where to find more information  · [employer] [place] of Arthritis and Musculoskeletal and Skin Diseases: www.niams.nih.gov  · [employer] [place] on Aging: www.stan.nih.gov  · American College of Rheumatology:  www.rheumatology.org  Contact a health care provider if:  · Your skin turns red.  · You develop a rash.  · You have pain that gets worse.  · You have a fever along with joint or muscle aches.  Get help right away if:  · You lose a lot of weight.  · You suddenly lose your appetite.  · You have night sweats.  Summary  · Osteoarthritis is a type of arthritis that affects tissue covering the ends of bones in joints (cartilage).  · This condition is caused by age-related wearing down of cartilage that covers the ends of bones.  · The main symptom of this condition is pain, swelling, and stiffness in the joint.  · There is no cure for this condition, but treatment can help to control pain and improve joint function.  This information is not intended to replace advice given to you by your health care provider. Make sure you discuss any questions you have with your health care provider.  Document Revised: 11/30/2018 Document Reviewed: 08/21/2017  Elsevier Patient Education © 2020 [employer]

## 2021-01-15 ENCOUNTER — TELEPHONE (OUTPATIENT)
Dept: INTERNAL MEDICINE | Age: 66
End: 2021-01-15

## 2021-01-15 DIAGNOSIS — E78.5 HYPERLIPIDEMIA, UNSPECIFIED HYPERLIPIDEMIA TYPE: Primary | ICD-10-CM

## 2021-01-15 LAB
ALBUMIN SERPL-MCNC: 4.3 G/DL (ref 3.5–5.2)
ALBUMIN/GLOB SERPL: 1.7 G/DL
ALP SERPL-CCNC: 60 U/L (ref 39–117)
ALT SERPL-CCNC: 13 U/L (ref 1–33)
AST SERPL-CCNC: 22 U/L (ref 1–32)
BILIRUB SERPL-MCNC: 0.2 MG/DL (ref 0–1.2)
BUN SERPL-MCNC: 16 MG/DL (ref 8–23)
BUN/CREAT SERPL: 18.8 (ref 7–25)
CALCIUM SERPL-MCNC: 9.5 MG/DL (ref 8.6–10.5)
CHLORIDE SERPL-SCNC: 103 MMOL/L (ref 98–107)
CHOLEST SERPL-MCNC: 238 MG/DL (ref 0–200)
CHOLEST/HDLC SERPL: 3.26 {RATIO}
CO2 SERPL-SCNC: 27.7 MMOL/L (ref 22–29)
CREAT SERPL-MCNC: 0.85 MG/DL (ref 0.57–1)
GLOBULIN SER CALC-MCNC: 2.5 GM/DL
GLUCOSE SERPL-MCNC: 91 MG/DL (ref 65–99)
HCV AB S/CO SERPL IA: <0.1 S/CO RATIO (ref 0–0.9)
HDLC SERPL-MCNC: 73 MG/DL (ref 40–60)
LDLC SERPL CALC-MCNC: 156 MG/DL (ref 0–100)
POTASSIUM SERPL-SCNC: 4 MMOL/L (ref 3.5–5.2)
PROT SERPL-MCNC: 6.8 G/DL (ref 6–8.5)
SODIUM SERPL-SCNC: 140 MMOL/L (ref 136–145)
TRIGL SERPL-MCNC: 54 MG/DL (ref 0–150)
VLDLC SERPL CALC-MCNC: 9 MG/DL (ref 5–40)

## 2021-01-15 RX ORDER — ATORVASTATIN CALCIUM 10 MG/1
10 TABLET, FILM COATED ORAL DAILY
Qty: 90 TABLET | Refills: 1 | Status: SHIPPED | OUTPATIENT
Start: 2021-01-15 | End: 2021-03-19 | Stop reason: SDUPTHER

## 2021-01-15 NOTE — TELEPHONE ENCOUNTER
----- Message from Leidy Reeves MA sent at 1/15/2021  2:10 PM EST -----  Pt informed of lab results Via phone. Pt would like to start the statin medication.

## 2021-01-15 NOTE — TELEPHONE ENCOUNTER
"----- Message from GABINO Barr sent at 1/15/2021  1:25 PM EST -----  Please call patient with results and send result letter to home address.    Julissa:     Here are the results of your labs from your visit.    Your CMP labs are all normal. This is a measure of kidney function, electrolytes, and liver function. Fasting blood sugar is at an acceptable level.     Lipid panel shows that total cholesterol and LDL cholesterol numbers are still high, slightly higher than previous values. LDL cholesterol is considered the \"bad\" cholesterol because it causes buildup in the blood vessels of the body and heart, predisposing you to greater risk of heart attack, stroke, and peripheral artery disease.     Your 10-year ASCVD risk (risk of heart attack,stroke, or peripheral vascular disease in the next 10 years) at this time is 10%, which is intermediate risk. ASCVD risk is calculated based on risk factors including age, gender, race, hypertension, diabetes and smoking history.  It is recommended to start statin therapy (medication for lowering cholesterol) once your ASCVD risk is 7.5% or greater OR your LDL is 190 or greater.   I would recommend starting medication at this point to reduce your cardiovascular and stroke risk. Please let me know if you are agreeable to this.     Swetha LLOYD       "

## 2021-01-15 NOTE — TELEPHONE ENCOUNTER
Contact patient.     She will need repeat labs in 2 months (fasting) to evaluate response to medication. Please schedule her for lab appt.   RX sent to pharmacy.

## 2021-03-19 ENCOUNTER — TELEPHONE (OUTPATIENT)
Dept: INTERNAL MEDICINE | Age: 66
End: 2021-03-19

## 2021-03-19 DIAGNOSIS — E78.5 HYPERLIPIDEMIA, UNSPECIFIED HYPERLIPIDEMIA TYPE: ICD-10-CM

## 2021-03-19 RX ORDER — ATORVASTATIN CALCIUM 10 MG/1
10 TABLET, FILM COATED ORAL DAILY
Qty: 90 TABLET | Refills: 1 | Status: SHIPPED | OUTPATIENT
Start: 2021-03-19 | End: 2021-07-15

## 2021-03-19 NOTE — TELEPHONE ENCOUNTER
----- Message from Leidy Reeves MA sent at 3/19/2021  1:45 PM EDT -----  Pt informed of lab results letter sent. Pt only had 30 day supply. Can she lower it naturally? Pt hasn't had medication in a month.

## 2021-03-19 NOTE — TELEPHONE ENCOUNTER
I would probably go ahead and continue the medication for now. She seems to have a pretty healthy diet from our previous discussions. A lot of her cholesterol issues may be related to family history. I will go ahead and send refills and we can recheck it again at her next visit.

## 2021-03-22 ENCOUNTER — BULK ORDERING (OUTPATIENT)
Dept: CASE MANAGEMENT | Facility: OTHER | Age: 66
End: 2021-03-22

## 2021-03-22 DIAGNOSIS — Z23 IMMUNIZATION DUE: ICD-10-CM

## 2021-07-13 ENCOUNTER — APPOINTMENT (OUTPATIENT)
Dept: WOMENS IMAGING | Facility: HOSPITAL | Age: 66
End: 2021-07-13

## 2021-07-13 ENCOUNTER — PROCEDURE VISIT (OUTPATIENT)
Dept: OBSTETRICS AND GYNECOLOGY | Facility: CLINIC | Age: 66
End: 2021-07-13

## 2021-07-13 ENCOUNTER — OFFICE VISIT (OUTPATIENT)
Dept: OBSTETRICS AND GYNECOLOGY | Facility: CLINIC | Age: 66
End: 2021-07-13

## 2021-07-13 VITALS
BODY MASS INDEX: 26.8 KG/M2 | DIASTOLIC BLOOD PRESSURE: 78 MMHG | WEIGHT: 157 LBS | HEIGHT: 64 IN | SYSTOLIC BLOOD PRESSURE: 140 MMHG

## 2021-07-13 DIAGNOSIS — Z12.31 VISIT FOR SCREENING MAMMOGRAM: Primary | ICD-10-CM

## 2021-07-13 DIAGNOSIS — Z78.0 POSTMENOPAUSE: ICD-10-CM

## 2021-07-13 DIAGNOSIS — Z01.419 WOMEN'S ANNUAL ROUTINE GYNECOLOGICAL EXAMINATION: Primary | ICD-10-CM

## 2021-07-13 PROCEDURE — 77067 SCR MAMMO BI INCL CAD: CPT | Performed by: RADIOLOGY

## 2021-07-13 PROCEDURE — 77063 BREAST TOMOSYNTHESIS BI: CPT | Performed by: NURSE PRACTITIONER

## 2021-07-13 PROCEDURE — G0101 CA SCREEN;PELVIC/BREAST EXAM: HCPCS | Performed by: NURSE PRACTITIONER

## 2021-07-13 PROCEDURE — 77067 SCR MAMMO BI INCL CAD: CPT | Performed by: NURSE PRACTITIONER

## 2021-07-13 PROCEDURE — 77063 BREAST TOMOSYNTHESIS BI: CPT | Performed by: RADIOLOGY

## 2021-07-13 NOTE — PROGRESS NOTES
GYN Annual Exam     Chief Complaint   Patient presents with   • Gynecologic Exam     Annual exam - last pap 2020 ASCUS, HPV negative (needs repeat pap), MG today, Dexa 2020       Julissa Armenta is a 65 y.o. female who presents for annual well woman exam. She is postmenopausal. She denies vaginal spotting or discharge. She completes SBE monthly. Hx of HTN, no current medications.       This is my first time meeting Julissa Armenta  She is a former pt of Dr Duran's    OB History        1    Para   1    Term   1            AB        Living           SAB        TAB        Ectopic        Molar        Multiple        Live Births                    Mammogram: Today  Dexa scan:  osteopenia, she is taking vitamin D. She has previously taken fosamax  Colonoscopy: due   Last Pap : ASCUS, negative HPV 2020  History of abnormal Pap smear: yes  Family history of uterine, colon or ovarian cancer: yes - MGM colon cancer  Family history of breast cancer: no  History of abnormal mammogram: no    Menopause:  Bleeding since? no  Vasomotor symptoms: no  HRT: no  Incontinence?  No, .Patient reports that she is not currently experiencing any symptoms of urinary incontinence.  Dyspareunia: no      Past Medical History:   Diagnosis Date   • Disease of thyroid gland    • Goiter    • Hypertension        Past Surgical History:   Procedure Laterality Date   • COLONOSCOPY         • FINGER SURGERY     • THYROID SURGERY           Current Outpatient Medications:   •  ascorbic acid (VITAMIN C) 1000 MG tablet, Take 1,000 mg by mouth Daily., Disp: , Rfl:   •  aspirin 81 MG EC tablet, Take 1 tablet by mouth Daily. (Patient taking differently: Take  by mouth Daily.), Disp: , Rfl:   •  [employer] POTASSIUM GLUCONATE PO, Take  by mouth., Disp: , Rfl:   •  Misc Natural Products (EQ GLUCOSAMINE-CHONDROITIN-MSM PO), Take  by mouth., Disp: , Rfl:   •  Multiple Vitamin (MULTI-DAY VITAMINS PO), Take  by mouth., Disp: , Rfl:   •   "Vitamin D, Cholecalciferol, 25 MCG (1000 UT) capsule, Take  by mouth., Disp: , Rfl:   •  atorvastatin (LIPITOR) 10 MG tablet, Take 1 tablet by mouth Daily., Disp: 90 tablet, Rfl: 1  •  Diclofenac Sodium (VOLTAREN) 1 % gel gel, Apply 4 g topically to the appropriate area as directed 4 (Four) Times a Day As Needed (pain)., Disp: 100 g, Rfl: 0    Allergies   Allergen Reactions   • Cephalexin Rash       Social History     Tobacco Use   • Smoking status: Never Smoker   • Smokeless tobacco: Never Used   Substance Use Topics   • Alcohol use: No   • Drug use: No       Family History   Problem Relation Age of Onset   • Colon cancer Maternal Grandmother    • Hypertension Mother    • Diabetes Mother    • Multiple myeloma Mother    • Hypertension Sister    • Heart disease Sister    • Diabetes Brother    • Hypertension Brother    • Hypertension Sister    • Liver cancer Brother    • Hypertension Brother        Review of Systems   Constitutional: Negative for chills, fatigue and fever.   Gastrointestinal: Negative for abdominal distention, abdominal pain, nausea and vomiting.   Endocrine: Negative for cold intolerance and heat intolerance.   Genitourinary: Negative for dyspareunia, dysuria, pelvic pain, vaginal bleeding, vaginal discharge and vaginal pain.   Musculoskeletal: Negative for gait problem.   Skin: Negative for rash.   Psychiatric/Behavioral: Negative for behavioral problems.       /78   Ht 162.6 cm (64\")   Wt 71.2 kg (157 lb)   BMI 26.95 kg/m²     Physical Exam  Constitutional:       Appearance: Normal appearance.   Genitourinary:      Pelvic exam was performed with patient in the lithotomy position.      Vulva, inguinal canal, urethra, vagina, cervix, uterus, right adnexa and left adnexa normal.   Rectum:      No external hemorrhoid.   HENT:      Head: Normocephalic and atraumatic.   Cardiovascular:      Rate and Rhythm: Normal rate.   Pulmonary:      Effort: Pulmonary effort is normal.   Chest:      Breasts: " Breasts are symmetrical.         Right: Normal.         Left: Normal.   Abdominal:      General: There is no distension.      Palpations: Abdomen is soft. There is no mass.      Tenderness: There is no abdominal tenderness. There is no guarding.      Hernia: No hernia is present.   Musculoskeletal:         General: Normal range of motion.      Cervical back: Normal range of motion and neck supple. No tenderness.   Lymphadenopathy:      Cervical: No cervical adenopathy.      Upper Body:      Right upper body: No supraclavicular, axillary or pectoral adenopathy.      Left upper body: No supraclavicular, axillary or pectoral adenopathy.   Neurological:      General: No focal deficit present.      Mental Status: She is alert and oriented to person, place, and time.      Cranial Nerves: No cranial nerve deficit.   Skin:     General: Skin is warm and dry.   Psychiatric:         Mood and Affect: Mood normal.         Behavior: Behavior normal.         Thought Content: Thought content normal.         Judgment: Judgment normal.   Vitals reviewed.         Assessment     1) GYN annual well woman exam.   2) Postmenopausal      Plan     1) Breast Health - Clinical breast exam & mammogram yearly, Self breast awareness. Schedule screening mammogram.   2) Pap - collected today  3) STD-no  4) Smoking status- nonsmoker  5) Colon health - screening colonoscopy recommended if not up to date  6) Patient's Body mass index is 26.95 kg/m². indicating that she is overweight by BMI (BMI 25-29.9).   7) Bone health - Weight bearing exercise, dietary calcium recommendations and vitamin D  reviewed. Repeat Dexa due 2022  8) Encouraged 150 minutes of exercise per week if not medically contraindicated    Follow up prn and one year    Mikki Bryson, GABINO  7/13/2021  12:02 EDT

## 2021-07-15 ENCOUNTER — OFFICE VISIT (OUTPATIENT)
Dept: INTERNAL MEDICINE | Age: 66
End: 2021-07-15

## 2021-07-15 ENCOUNTER — HOSPITAL ENCOUNTER (OUTPATIENT)
Dept: GENERAL RADIOLOGY | Facility: HOSPITAL | Age: 66
Discharge: HOME | End: 2021-07-15
Admitting: NURSE PRACTITIONER

## 2021-07-15 VITALS
OXYGEN SATURATION: 98 % | SYSTOLIC BLOOD PRESSURE: 116 MMHG | HEART RATE: 68 BPM | HEIGHT: 64 IN | TEMPERATURE: 97.2 F | BODY MASS INDEX: 26.73 KG/M2 | DIASTOLIC BLOOD PRESSURE: 78 MMHG | WEIGHT: 156.6 LBS

## 2021-07-15 DIAGNOSIS — G89.29 CHRONIC LEFT SHOULDER PAIN: ICD-10-CM

## 2021-07-15 DIAGNOSIS — E78.5 HYPERLIPIDEMIA, UNSPECIFIED HYPERLIPIDEMIA TYPE: ICD-10-CM

## 2021-07-15 DIAGNOSIS — I10 ESSENTIAL HYPERTENSION: Primary | ICD-10-CM

## 2021-07-15 DIAGNOSIS — R73.01 IFG (IMPAIRED FASTING GLUCOSE): ICD-10-CM

## 2021-07-15 DIAGNOSIS — M25.512 CHRONIC LEFT SHOULDER PAIN: ICD-10-CM

## 2021-07-15 DIAGNOSIS — M81.0 AGE-RELATED OSTEOPOROSIS WITHOUT CURRENT PATHOLOGICAL FRACTURE: ICD-10-CM

## 2021-07-15 PROCEDURE — 99214 OFFICE O/P EST MOD 30 MIN: CPT | Performed by: NURSE PRACTITIONER

## 2021-07-15 PROCEDURE — 73030 X-RAY EXAM OF SHOULDER: CPT

## 2021-07-15 NOTE — PROGRESS NOTES
"Chief Complaint  Hyperlipidemia and Hypertension    Subjective          Julissa Armenta presents to [facility]  Hyperlipidemia  This is a chronic problem. The problem is controlled. Current antihyperlipidemic treatment includes statins (Patient reports took 90 days of statin, stopped last month, made dietary changes on own). Risk factors for coronary artery disease include post-menopausal, hypertension and dyslipidemia.   Hypertension  This is a chronic problem. The problem is controlled. Current antihypertension treatment includes lifestyle changes. There are no compliance problems.      Continue to complain of chronic shoulder pain (left). Carried mail for 23 years as . Shoulder pain worsening, certain movements causing shooting pain through shoulder. No weakness or decrease ROM.     Objective   Vital Signs:   /78   Pulse 68   Temp 97.2 °F (36.2 °C)   Ht 162.6 cm (64.02\")   Wt 71 kg (156 lb 9.6 oz)   SpO2 98%   BMI 26.87 kg/m²     Physical Exam  Vitals and nursing note reviewed.   Constitutional:       General: She is not in acute distress.     Appearance: She is well-developed. She is not ill-appearing.   Cardiovascular:      Rate and Rhythm: Normal rate and regular rhythm.      Heart sounds: Normal heart sounds, S1 normal and S2 normal. No murmur heard.     Pulmonary:      Effort: Pulmonary effort is normal.      Breath sounds: Normal breath sounds. No decreased breath sounds, wheezing, rhonchi or rales.   Skin:     General: Skin is warm and dry.   Neurological:      Mental Status: She is alert and oriented to person, place, and time.   Psychiatric:         Speech: Speech normal.         Behavior: Behavior normal. Behavior is cooperative.         Thought Content: Thought content normal.         Judgment: Judgment normal.        Result Review :   The following data was reviewed by: GABINO Barr on 07/15/2021:  Common labs    Common Labsle 1/14/21 " 1/14/21 3/18/21 3/18/21    0801 0801 0821 0821   Glucose 91   97   BUN 16   19   Creatinine 0.85   0.92   eGFR Non  Am 67   61   eGFR African Am 81   74   Sodium 140   143   Potassium 4.0   4.8   Chloride 103   105   Calcium 9.5   9.8   Total Protein 6.8   6.8   Albumin 4.30   4.40   Total Bilirubin 0.2   0.2   Alkaline Phosphatase 60   64   AST (SGOT) 22   26   ALT (SGPT) 13   19   Total Cholesterol  238 (A) 220 (A)    Triglycerides  54 53    HDL Cholesterol  73 (A) 71 (A)    LDL Cholesterol   156 (A) 140 (A)    (A) Abnormal value       Comments are available for some flowsheets but are not being displayed.                  Assessment and Plan    Diagnoses and all orders for this visit:    1. Essential hypertension (Primary)  Blood pressure stable off of medication.  Check labs today, follow up 6 months.     -     Hemoglobin A1c    2. Hyperlipidemia, unspecified hyperlipidemia type  Check fasting lipid panel today.  Patient stopped statin about 1 week ago. Would prefer not to go back on statin if possible.   Continue to follow and improve on low-fat, low carbohydrate diet.     -     Comprehensive Metabolic Panel  -     Lipid Panel With / Chol / HDL Ratio    3. Age-related osteoporosis without current pathological fracture    4. IFG (impaired fasting glucose)  -     Hemoglobin A1c    5. Chronic left shoulder pain  XR today. Suspect OA.  May consider recommendations for referral to PT and/or orthopedics. Can use topical Voltaren gel as needed.   -     XR Shoulder 2+ View Left      Follow Up   Return in about 6 months (around 1/15/2022) for Next scheduled follow up.  Patient was given instructions and counseling regarding her condition or for health maintenance advice. Please see specific information pulled into the AVS if appropriate.

## 2021-07-16 DIAGNOSIS — E78.5 HYPERLIPIDEMIA, UNSPECIFIED HYPERLIPIDEMIA TYPE: Primary | ICD-10-CM

## 2021-07-16 LAB
ALBUMIN SERPL-MCNC: 4.2 G/DL (ref 3.5–5.2)
ALBUMIN/GLOB SERPL: 1.7 G/DL
ALP SERPL-CCNC: 72 U/L (ref 39–117)
ALT SERPL-CCNC: 15 U/L (ref 1–33)
AST SERPL-CCNC: 22 U/L (ref 1–32)
BILIRUB SERPL-MCNC: 0.2 MG/DL (ref 0–1.2)
BUN SERPL-MCNC: 12 MG/DL (ref 8–23)
BUN/CREAT SERPL: 15.2 (ref 7–25)
CALCIUM SERPL-MCNC: 9.4 MG/DL (ref 8.6–10.5)
CHLORIDE SERPL-SCNC: 106 MMOL/L (ref 98–107)
CHOLEST SERPL-MCNC: 218 MG/DL (ref 0–200)
CHOLEST/HDLC SERPL: 3.16 {RATIO}
CO2 SERPL-SCNC: 26.9 MMOL/L (ref 22–29)
CREAT SERPL-MCNC: 0.79 MG/DL (ref 0.57–1)
GLOBULIN SER CALC-MCNC: 2.5 GM/DL
GLUCOSE SERPL-MCNC: 87 MG/DL (ref 65–99)
HBA1C MFR BLD: 5.9 % (ref 4.8–5.6)
HDLC SERPL-MCNC: 69 MG/DL (ref 40–60)
LDLC SERPL CALC-MCNC: 140 MG/DL (ref 0–100)
POTASSIUM SERPL-SCNC: 4.5 MMOL/L (ref 3.5–5.2)
PROT SERPL-MCNC: 6.7 G/DL (ref 6–8.5)
SODIUM SERPL-SCNC: 143 MMOL/L (ref 136–145)
TRIGL SERPL-MCNC: 51 MG/DL (ref 0–150)
VLDLC SERPL CALC-MCNC: 9 MG/DL (ref 5–40)

## 2021-07-16 RX ORDER — ATORVASTATIN CALCIUM 10 MG/1
10 TABLET, FILM COATED ORAL DAILY
Qty: 90 TABLET | Refills: 3 | Status: SHIPPED | OUTPATIENT
Start: 2021-07-16 | End: 2022-07-11 | Stop reason: SDUPTHER

## 2021-07-18 LAB
CONV .: NORMAL
CYTOLOGIST CVX/VAG CYTO: NORMAL
CYTOLOGY CVX/VAG DOC CYTO: NORMAL
CYTOLOGY CVX/VAG DOC THIN PREP: NORMAL
DX ICD CODE: NORMAL
HIV 1 & 2 AB SER-IMP: NORMAL
Lab: NORMAL
OTHER STN SPEC: NORMAL
STAT OF ADQ CVX/VAG CYTO-IMP: NORMAL

## 2021-07-19 ENCOUNTER — TELEPHONE (OUTPATIENT)
Dept: INTERNAL MEDICINE | Age: 66
End: 2021-07-19

## 2021-07-21 ENCOUNTER — HOSPITAL ENCOUNTER (OUTPATIENT)
Dept: ULTRASOUND IMAGING | Facility: HOSPITAL | Age: 66
Discharge: HOME | End: 2021-07-21
Admitting: NURSE PRACTITIONER

## 2021-07-21 DIAGNOSIS — E04.2 MULTIPLE THYROID NODULES: ICD-10-CM

## 2021-07-21 PROCEDURE — 76536 US EXAM OF HEAD AND NECK: CPT

## 2021-12-21 ENCOUNTER — TELEPHONE (OUTPATIENT)
Dept: INTERNAL MEDICINE | Age: 66
End: 2021-12-21

## 2021-12-21 NOTE — TELEPHONE ENCOUNTER
Caller: Julissa Armenta    Relationship to patient: Self    Best call back number: [phone]    Patient is needing: PATIENT CALLED IN AND SAID SHE TALKED TO A FRIEND FOR A COUPLE MINUTES ON Sunday AND HER FRIEND JUST FOUND OUT SHE HAS COVID. THE PATIENT SAID THEY BOTH WERE WEARING MASKS AND ARE VACCINATED. SHE SAID THAT IT WAS A BRIEF ENCOUNTER BUT SHE WANTS TO MAKE SURE SHE DOES NOT NEED TO GET TESTED? PLEASE CALL PATIENT AND ADVISE.

## 2022-01-17 ENCOUNTER — OFFICE VISIT (OUTPATIENT)
Dept: INTERNAL MEDICINE | Age: 67
End: 2022-01-17

## 2022-01-17 VITALS
BODY MASS INDEX: 27.45 KG/M2 | HEIGHT: 64 IN | OXYGEN SATURATION: 98 % | SYSTOLIC BLOOD PRESSURE: 152 MMHG | HEART RATE: 98 BPM | TEMPERATURE: 97.8 F | DIASTOLIC BLOOD PRESSURE: 88 MMHG | WEIGHT: 160.8 LBS

## 2022-01-17 DIAGNOSIS — Z87.19 HISTORY OF ESOPHAGEAL STRICTURE: ICD-10-CM

## 2022-01-17 DIAGNOSIS — R73.01 IFG (IMPAIRED FASTING GLUCOSE): ICD-10-CM

## 2022-01-17 DIAGNOSIS — I10 PRIMARY HYPERTENSION: Primary | ICD-10-CM

## 2022-01-17 DIAGNOSIS — E04.2 MULTIPLE THYROID NODULES: ICD-10-CM

## 2022-01-17 DIAGNOSIS — M81.0 AGE-RELATED OSTEOPOROSIS WITHOUT CURRENT PATHOLOGICAL FRACTURE: ICD-10-CM

## 2022-01-17 DIAGNOSIS — R13.19 ESOPHAGEAL DYSPHAGIA: ICD-10-CM

## 2022-01-17 DIAGNOSIS — E78.5 HYPERLIPIDEMIA, UNSPECIFIED HYPERLIPIDEMIA TYPE: ICD-10-CM

## 2022-01-17 PROCEDURE — 99214 OFFICE O/P EST MOD 30 MIN: CPT | Performed by: NURSE PRACTITIONER

## 2022-01-17 NOTE — PROGRESS NOTES
"Chief Complaint  Hyperlipidemia and Hypertension    Subjective          Julissa Armenta presents to [facility]  Hyperlipidemia  This is a chronic problem. Pertinent negatives include no chest pain or shortness of breath. Current antihyperlipidemic treatment includes statins. There are no compliance problems.  Risk factors for coronary artery disease include dyslipidemia, hypertension and post-menopausal.   Hypertension  This is a chronic problem. The problem is controlled (Reports checks BP daily at home, usually 120/80 range, did shovel snow before coming into office today). Pertinent negatives include no anxiety, blurred vision, chest pain, headaches, peripheral edema, PND, shortness of breath or sweats. Risk factors for coronary artery disease include dyslipidemia and post-menopausal state. Past treatments include calcium channel blockers. Current antihypertension treatment includes nothing. There are no compliance problems.        Objective   Vital Signs:   /88   Pulse 98   Temp 97.8 °F (36.6 °C)   Ht 162.6 cm (64.02\")   Wt 72.9 kg (160 lb 12.8 oz)   SpO2 98%   BMI 27.59 kg/m²     Physical Exam  Vitals and nursing note reviewed.   Constitutional:       General: She is not in acute distress.     Appearance: She is well-developed. She is not ill-appearing.   Cardiovascular:      Rate and Rhythm: Normal rate and regular rhythm.      Heart sounds: Normal heart sounds, S1 normal and S2 normal. No murmur heard.      Pulmonary:      Effort: Pulmonary effort is normal.      Breath sounds: Normal breath sounds. No decreased breath sounds, wheezing, rhonchi or rales.   Skin:     General: Skin is warm and dry.   Neurological:      Mental Status: She is alert and oriented to person, place, and time.   Psychiatric:         Speech: Speech normal.         Behavior: Behavior normal. Behavior is cooperative.         Thought Content: Thought content normal.         Judgment: Judgment " Left message for patient to return call   normal.        Result Review :   The following data was reviewed by: GABINO Barr on 01/17/2022:  Common labs    Common Labsle 3/18/21 3/18/21 7/15/21 7/15/21 7/15/21    0821 0821 0734 0734 0734   Glucose  97 87     BUN  19 12     Creatinine  0.92 0.79     eGFR Non  Am  61 73     eGFR African Am  74 88     Sodium  143 143     Potassium  4.8 4.5     Chloride  105 106     Calcium  9.8 9.4     Total Protein  6.8 6.7     Albumin  4.40 4.20     Total Bilirubin  0.2 0.2     Alkaline Phosphatase  64 72     AST (SGOT)  26 22     ALT (SGPT)  19 15     Total Cholesterol 220 (A)    218 (A)   Triglycerides 53    51   HDL Cholesterol 71 (A)    69 (A)   LDL Cholesterol  140 (A)    140 (A)   Hemoglobin A1C    5.90 (A)    (A) Abnormal value       Comments are available for some flowsheets but are not being displayed.           Data reviewed: Radiologic studies      US THYROID-     CLINICAL INFORMATION: Followup thyroid nodules, previous right  thyroidectomy. Prior thyroid biopsy.     COMPARISON: 07/20/2020 and 02/22/2012.     FINDINGS: Right thyroidectomy site is stable. The left lobe measures 5.0  x 2.6 x 1.8 cm. The isthmus is 7 mm.     There are 2 thyroid nodules demonstrated within the left gland, one  within the midpole and the other one in the lower pole. Both of these  remain stable in size compared to the previous examination. Both are  isoechoic solid, ACR TR Category 3. None of these qualify for  fine-needle aspiration.     Located at the junction of the isthmus and left gland there is a  hypoechoic solid nodule which remain stable in size currently measuring  8 x 4 x 5 mm. ACR TR category 4. This does not qualify for fine-needle  aspiration. Second nodule within the isthmus is slightly hypoechoic and  predominantly solid measuring 13 x 5 x 10 mm. ACR TR Category 4. Neither  of these nodules qualify for fine-needle aspiration.     There are other smaller thyroid nodules demonstrated which are  clinically  insignificant. No suspicious lesion has developed.     CONCLUSION: Thyroid nodules as described above, none qualifies for  fine-needle aspiration. Continue conservative sonographic surveillance  with one year followup.     This report was finalized on 7/21/2021 3:39 PM by Dr. Momo Sierra M.D.         Assessment and Plan    Diagnoses and all orders for this visit:    1. Primary hypertension (Primary)  BP high today. Patient reports that she has been monitoring at home, consistently < 130/80.   Advised to continue monitor at home, notify office/new PCP if BP > 140/80.   Continue lifestyle modifications for high blood pressure, high cholesterol, and increased weight. Decrease/eliminate soda, caffeine, alcohol and overall caloric intake. Reduce carbohydrates and sweets in diet.  Continue to improve dietary habits with lean proteins, fresh vegetables, fruits, and nuts. Improve aerobic exercise: walking/biking/swimming daily as tolerated, recommend 30 minutes/day at least 5 days/week.     -     Comprehensive Metabolic Panel    2. Hyperlipidemia, unspecified hyperlipidemia type  Check fasting lipid panel today.  Adjustment of medication as necessary.  Continue to follow and improve on low-fat, low carbohydrate diet.     -     Lipid Panel With / Chol / HDL Ratio    3. Multiple thyroid nodules  Patient that she will be due for repeat annual thyroid ultrasound in July, she will need to discuss this with her new primary care provider.   -     TSH+Free T4    4. Age-related osteoporosis without current pathological fracture  -     Vitamin D 25 Hydroxy    5. IFG (impaired fasting glucose)  -     Hemoglobin A1c    6. History of esophageal stricture  Complaints of issues swallowing intermittently, has history of previous esophageal dilatation due to stricture.  Requesting referral for EGD.   -     Ambulatory Referral to Gastroenterology    7. Esophageal dysphagia  -     Ambulatory Referral to Gastroenterology        Follow Up    Return in about 6 months (around 7/17/2022) for Next scheduled follow up.  Patient was given instructions and counseling regarding her condition or for health maintenance advice. Please see specific information pulled into the AVS if appropriate.

## 2022-01-18 LAB
25(OH)D3+25(OH)D2 SERPL-MCNC: 26.7 NG/ML (ref 30–100)
ALBUMIN SERPL-MCNC: 4.1 G/DL (ref 3.8–4.8)
ALBUMIN/GLOB SERPL: 1.7 {RATIO} (ref 1.2–2.2)
ALP SERPL-CCNC: 76 IU/L (ref 44–121)
ALT SERPL-CCNC: 15 IU/L (ref 0–32)
AST SERPL-CCNC: 24 IU/L (ref 0–40)
BILIRUB SERPL-MCNC: <0.2 MG/DL (ref 0–1.2)
BUN SERPL-MCNC: 12 MG/DL (ref 8–27)
BUN/CREAT SERPL: 14 (ref 12–28)
CALCIUM SERPL-MCNC: 9.3 MG/DL (ref 8.7–10.3)
CHLORIDE SERPL-SCNC: 107 MMOL/L (ref 96–106)
CHOLEST SERPL-MCNC: 183 MG/DL (ref 100–199)
CHOLEST/HDLC SERPL: 2.4 RATIO (ref 0–4.4)
CO2 SERPL-SCNC: 25 MMOL/L (ref 20–29)
CREAT SERPL-MCNC: 0.86 MG/DL (ref 0.57–1)
GLOBULIN SER CALC-MCNC: 2.4 G/DL (ref 1.5–4.5)
GLUCOSE SERPL-MCNC: 91 MG/DL (ref 65–99)
HBA1C MFR BLD: 5.7 % (ref 4.8–5.6)
HDLC SERPL-MCNC: 75 MG/DL
LDLC SERPL CALC-MCNC: 98 MG/DL (ref 0–99)
POTASSIUM SERPL-SCNC: 3.9 MMOL/L (ref 3.5–5.2)
PROT SERPL-MCNC: 6.5 G/DL (ref 6–8.5)
SODIUM SERPL-SCNC: 144 MMOL/L (ref 134–144)
T4 FREE SERPL-MCNC: 1.07 NG/DL (ref 0.82–1.77)
TRIGL SERPL-MCNC: 53 MG/DL (ref 0–149)
TSH SERPL DL<=0.005 MIU/L-ACNC: 1.8 UIU/ML (ref 0.45–4.5)
VLDLC SERPL CALC-MCNC: 10 MG/DL (ref 5–40)

## 2022-01-25 ENCOUNTER — PRE-PROCEDURE SCREENING (OUTPATIENT)
Dept: GASTROENTEROLOGY | Facility: CLINIC | Age: 67
End: 2022-01-25

## 2022-02-28 ENCOUNTER — TELEPHONE (OUTPATIENT)
Dept: GASTROENTEROLOGY | Facility: CLINIC | Age: 67
End: 2022-02-28

## 2022-03-01 NOTE — TELEPHONE ENCOUNTER
It sounds more as though her issue may be with the swallowing mechanism above the esophagus.  I would recommend an office visit to determine if EGD is the best test-please schedule with next available provider

## 2022-03-01 NOTE — TELEPHONE ENCOUNTER
Called pt and advised of Dr Pickens's note. Verb understanding and f/u appt made for 03/02 at 115p with Julia VALDIVIA

## 2022-03-08 ENCOUNTER — OFFICE VISIT (OUTPATIENT)
Dept: GASTROENTEROLOGY | Facility: CLINIC | Age: 67
End: 2022-03-08

## 2022-03-08 ENCOUNTER — PREP FOR SURGERY (OUTPATIENT)
Dept: OTHER | Facility: HOSPITAL | Age: 67
End: 2022-03-08

## 2022-03-08 ENCOUNTER — TELEPHONE (OUTPATIENT)
Dept: GASTROENTEROLOGY | Facility: CLINIC | Age: 67
End: 2022-03-08

## 2022-03-08 VITALS — BODY MASS INDEX: 27.82 KG/M2 | HEIGHT: 63 IN | TEMPERATURE: 97.3 F | WEIGHT: 157 LBS

## 2022-03-08 DIAGNOSIS — R13.19 ESOPHAGEAL DYSPHAGIA: Primary | ICD-10-CM

## 2022-03-08 PROCEDURE — 99203 OFFICE O/P NEW LOW 30 MIN: CPT | Performed by: INTERNAL MEDICINE

## 2022-03-08 NOTE — TELEPHONE ENCOUNTER
spoke with pt in office scheduled at [facility] on aprl 13 arrive at 0930 am [facility]-handed pt egd instructions in office ---3 pts ok to schedule on 4-13 per [facility] email

## 2022-03-08 NOTE — PROGRESS NOTES
Subjective   Chief Complaint   Patient presents with   • Difficulty Swallowing       Julissa Armenta is a  66 y.o. female here for dysphagia.  She reports longstanding issues with swallowing that she feels like her progressive.  She reports that sometimes she will have food gets stuck in her mid chest.  She also reports that her throat closes when she drinks cold liquids and she chokes.  Sometimes she will cough when she eats and she also coughs up food.  She denies any acid reflux or heartburn.  She reports when the food stops it is in the middle of her chest.  She had an EGD that we think was in 2005.  This was with another provider.  She had a colonoscopy in 2016 that was normal-10-year follow-up recommended.  Weight has been stable and appetite has been good.  HPI  Past Medical History:   Diagnosis Date   • Disease of thyroid gland    • Goiter    • Hypertension      Past Surgical History:   Procedure Laterality Date   • COLONOSCOPY      2015   • FINGER SURGERY     • THYROID SURGERY         Current Outpatient Medications:   •  ascorbic acid (VITAMIN C) 1000 MG tablet, Take 1,000 mg by mouth Daily., Disp: , Rfl:   •  aspirin 81 MG EC tablet, Take 1 tablet by mouth Daily. (Patient taking differently: Take  by mouth Daily.), Disp: , Rfl:   •  atorvastatin (LIPITOR) 10 MG tablet, Take 1 tablet by mouth Daily., Disp: 90 tablet, Rfl: 3  •  [employer]S POTASSIUM GLUCONATE PO, Take  by mouth., Disp: , Rfl:   •  Misc Natural Products (EQ GLUCOSAMINE-CHONDROITIN-MSM PO), Take  by mouth., Disp: , Rfl:   •  Multiple Vitamin (MULTI-DAY VITAMINS PO), Take  by mouth., Disp: , Rfl:   •  Vitamin D, Cholecalciferol, 25 MCG (1000 UT) capsule, Take 2,000 Units by mouth., Disp: , Rfl:   PRN Meds:.  Allergies   Allergen Reactions   • Cephalexin Rash     Social History     Socioeconomic History   • Marital status: Single   Tobacco Use   • Smoking status: Never Smoker   • Smokeless tobacco: Never Used   Substance and Sexual Activity   •  Alcohol use: No   • Drug use: No   • Sexual activity: Never     Birth control/protection: Post-menopausal     Family History   Problem Relation Age of Onset   • Colon cancer Maternal Grandmother    • Hypertension Mother    • Diabetes Mother    • Multiple myeloma Mother    • Hypertension Sister    • Heart disease Sister    • Diabetes Brother    • Hypertension Brother    • Hypertension Sister    • Liver cancer Brother    • Hypertension Brother      Review of Systems   Constitutional: Negative for appetite change and unexpected weight change.   HENT: Positive for trouble swallowing.    Gastrointestinal: Negative for abdominal pain and nausea.     Vitals:    03/08/22 1316   Temp: 97.3 °F (36.3 °C)         03/08/22  1316   Weight: 71.2 kg (157 lb)       Objective   Physical Exam  Constitutional:       Appearance: Normal appearance. She is well-developed.   HENT:      Head: Normocephalic and atraumatic.   Eyes:      General: No scleral icterus.     Conjunctiva/sclera: Conjunctivae normal.   Pulmonary:      Effort: Pulmonary effort is normal.   Abdominal:      General: There is no distension.      Palpations: Abdomen is soft.   Musculoskeletal:      Cervical back: Normal range of motion and neck supple.   Skin:     General: Skin is warm and dry.   Neurological:      Mental Status: She is alert.   Psychiatric:         Mood and Affect: Mood normal.         Behavior: Behavior normal.       No radiology results for the last 7 days    Assessment/Plan   Diagnoses and all orders for this visit:    Esophageal dysphagia      Plan:  · She certainly has some symptoms that sound esophageal but she also has symptoms that sound oropharyngeal.  We will begin with EGD for further evaluation but may also need video swallow depending on EGD results.  · No signs or symptoms of heartburn at this point-we will hold off any medication until she is had an endoscopic evaluation

## 2022-04-12 ENCOUNTER — LAB (OUTPATIENT)
Dept: LAB | Facility: HOSPITAL | Age: 67
End: 2022-04-12

## 2022-04-12 DIAGNOSIS — R13.19 ESOPHAGEAL DYSPHAGIA: ICD-10-CM

## 2022-04-12 LAB — SARS-COV-2 ORF1AB RESP QL NAA+PROBE: NOT DETECTED

## 2022-04-12 PROCEDURE — U0005 INFEC AGEN DETEC AMPLI PROBE: HCPCS

## 2022-04-12 PROCEDURE — U0004 COV-19 TEST NON-CDC HGH THRU: HCPCS

## 2022-04-12 PROCEDURE — C9803 HOPD COVID-19 SPEC COLLECT: HCPCS

## 2022-04-13 ENCOUNTER — ANESTHESIA (OUTPATIENT)
Dept: GASTROENTEROLOGY | Facility: HOSPITAL | Age: 67
End: 2022-04-13

## 2022-04-13 ENCOUNTER — ANESTHESIA EVENT (OUTPATIENT)
Dept: GASTROENTEROLOGY | Facility: HOSPITAL | Age: 67
End: 2022-04-13

## 2022-04-13 ENCOUNTER — HOSPITAL ENCOUNTER (OUTPATIENT)
Facility: HOSPITAL | Age: 67
Setting detail: HOSPITAL OUTPATIENT SURGERY
Discharge: HOME | End: 2022-04-13
Attending: INTERNAL MEDICINE | Admitting: INTERNAL MEDICINE

## 2022-04-13 VITALS
SYSTOLIC BLOOD PRESSURE: 141 MMHG | DIASTOLIC BLOOD PRESSURE: 85 MMHG | OXYGEN SATURATION: 98 % | HEART RATE: 70 BPM | RESPIRATION RATE: 16 BRPM | WEIGHT: 159.5 LBS | BODY MASS INDEX: 27.23 KG/M2 | HEIGHT: 64 IN

## 2022-04-13 DIAGNOSIS — R13.19 ESOPHAGEAL DYSPHAGIA: ICD-10-CM

## 2022-04-13 LAB — KOH PREP NAIL: ABNORMAL

## 2022-04-13 PROCEDURE — 43251 EGD REMOVE LESION SNARE: CPT | Performed by: INTERNAL MEDICINE

## 2022-04-13 PROCEDURE — 43249 ESOPH EGD DILATION <30 MM: CPT | Performed by: INTERNAL MEDICINE

## 2022-04-13 PROCEDURE — 25010000002 PROPOFOL 10 MG/ML EMULSION: Performed by: ANESTHESIOLOGY

## 2022-04-13 PROCEDURE — 88305 TISSUE EXAM BY PATHOLOGIST: CPT | Performed by: INTERNAL MEDICINE

## 2022-04-13 PROCEDURE — 87102 FUNGUS ISOLATION CULTURE: CPT | Performed by: INTERNAL MEDICINE

## 2022-04-13 PROCEDURE — S0260 H&P FOR SURGERY: HCPCS | Performed by: INTERNAL MEDICINE

## 2022-04-13 PROCEDURE — 87220 TISSUE EXAM FOR FUNGI: CPT | Performed by: INTERNAL MEDICINE

## 2022-04-13 PROCEDURE — 43239 EGD BIOPSY SINGLE/MULTIPLE: CPT | Performed by: INTERNAL MEDICINE

## 2022-04-13 PROCEDURE — C1726 CATH, BAL DIL, NON-VASCULAR: HCPCS | Performed by: INTERNAL MEDICINE

## 2022-04-13 RX ORDER — SODIUM CHLORIDE 0.9 % (FLUSH) 0.9 %
10 SYRINGE (ML) INJECTION AS NEEDED
Status: DISCONTINUED | OUTPATIENT
Start: 2022-04-13 | End: 2022-04-13 | Stop reason: HOSPADM

## 2022-04-13 RX ORDER — OMEPRAZOLE 20 MG/1
20 CAPSULE, DELAYED RELEASE ORAL DAILY
Qty: 30 CAPSULE | Refills: 5 | Status: SHIPPED | OUTPATIENT
Start: 2022-04-13 | End: 2022-05-31 | Stop reason: SDUPTHER

## 2022-04-13 RX ORDER — LIDOCAINE HYDROCHLORIDE 10 MG/ML
0.5 INJECTION, SOLUTION INFILTRATION; PERINEURAL ONCE AS NEEDED
Status: DISCONTINUED | OUTPATIENT
Start: 2022-04-13 | End: 2022-04-13 | Stop reason: HOSPADM

## 2022-04-13 RX ORDER — PROPOFOL 10 MG/ML
VIAL (ML) INTRAVENOUS AS NEEDED
Status: DISCONTINUED | OUTPATIENT
Start: 2022-04-13 | End: 2022-04-13 | Stop reason: SURG

## 2022-04-13 RX ORDER — SODIUM CHLORIDE, SODIUM LACTATE, POTASSIUM CHLORIDE, CALCIUM CHLORIDE 600; 310; 30; 20 MG/100ML; MG/100ML; MG/100ML; MG/100ML
1000 INJECTION, SOLUTION INTRAVENOUS CONTINUOUS
Status: DISCONTINUED | OUTPATIENT
Start: 2022-04-13 | End: 2022-04-13 | Stop reason: HOSPADM

## 2022-04-13 RX ADMIN — PROPOFOL 50 MG: 10 INJECTION, EMULSION INTRAVENOUS at 10:37

## 2022-04-13 RX ADMIN — PROPOFOL 50 MG: 10 INJECTION, EMULSION INTRAVENOUS at 10:41

## 2022-04-13 RX ADMIN — PROPOFOL 50 MG: 10 INJECTION, EMULSION INTRAVENOUS at 10:35

## 2022-04-13 RX ADMIN — SODIUM CHLORIDE, POTASSIUM CHLORIDE, SODIUM LACTATE AND CALCIUM CHLORIDE 1000 ML: 600; 310; 30; 20 INJECTION, SOLUTION INTRAVENOUS at 10:21

## 2022-04-13 RX ADMIN — PROPOFOL 50 MG: 10 INJECTION, EMULSION INTRAVENOUS at 10:45

## 2022-04-13 NOTE — ANESTHESIA PREPROCEDURE EVALUATION
Anesthesia Evaluation     Patient summary reviewed and Nursing notes reviewed   NPO Solid Status: > 8 hours  NPO Liquid Status: > 2 hours           Airway   Mallampati: II  TM distance: >3 FB  Neck ROM: full  Dental - normal exam     Pulmonary - negative pulmonary ROS and normal exam   Cardiovascular - normal exam    (+) hypertension, hyperlipidemia,       Neuro/Psych  (+) headaches,    GI/Hepatic/Renal/Endo    (+)   thyroid problem     Musculoskeletal (-) negative ROS    Abdominal    Substance History - negative use     OB/GYN negative ob/gyn ROS         Other                        Anesthesia Plan    ASA 2     MAC       Anesthetic plan, all risks, benefits, and alternatives have been provided, discussed and informed consent has been obtained with: patient.        CODE STATUS:

## 2022-04-13 NOTE — ANESTHESIA POSTPROCEDURE EVALUATION
"Patient: Julissa Armenta    Procedure Summary     Date: 04/13/22 Room / Location: [facility] ENDOSCOPY 10 /  [facility]    Anesthesia Start: 1031 Anesthesia Stop: 1057    Procedure: ESOPHAGOGASTRODUODENOSCOPY WITH BIOPSIES AND HOT SNARE POLYPECTOMY AND BRUSHING AND 12MM- 18MM BALLOON DILATION (N/A Esophagus) Diagnosis:       Esophageal dysphagia      (Esophageal dysphagia [R13.19])    Surgeons: Nayla Pickens MD Provider: Warner Roldan MD    Anesthesia Type: MAC ASA Status: 2          Anesthesia Type: MAC    Vitals  Vitals Value Taken Time   /85 04/13/22 1115   Temp     Pulse 70 04/13/22 1115   Resp 16 04/13/22 1115   SpO2 98 % 04/13/22 1115           Post Anesthesia Care and Evaluation    Patient location during evaluation: bedside  Patient participation: complete - patient participated  Level of consciousness: awake  Pain score: 2  Pain management: adequate  Airway patency: patent  Anesthetic complications: No anesthetic complications  PONV Status: none  Cardiovascular status: acceptable  Respiratory status: acceptable  Hydration status: acceptable    Comments: /85 (BP Location: Left arm, Patient Position: Sitting)   Pulse 70   Resp 16   Ht 162.6 cm (64\")   Wt 72.3 kg (159 lb 8 oz)   SpO2 98%   BMI 27.38 kg/m²         "

## 2022-04-13 NOTE — DISCHARGE INSTRUCTIONS
For the next 24 hours patient needs to be with a responsible adult.    For 24 hours DO NOT drive, operate machinery, appliances, drink alcohol, make important decisions or sign legal documents.    Start with a light or bland diet if you are feeling sick to your stomach otherwise advance to regular diet as tolerated.    Follow recommendations on procedure report if provided by your doctor.    Call Dr Pickens for problems [phone]    Problems may include but not limited to: large amounts of bleeding, trouble breathing, repeated vomiting, severe unrelieved pain, fever or chills.

## 2022-04-13 NOTE — H&P
[facility]  Pre Procedure History & Physical    Chief Complaint:   dysphagia    Subjective     HPI: Julissa Armenta is a  66 y.o. female here for dysphagia.  She reports longstanding issues with swallowing that she feels like her progressive.  She reports that sometimes she will have food gets stuck in her mid chest.  She also reports that her throat closes when she drinks cold liquids and she chokes.  Sometimes she will cough when she eats and she also coughs up food.  She denies any acid reflux or heartburn.  She reports when the food stops it is in the middle of her chest.  She had an EGD that we think was in 2005.  This was with another provider.      Past Medical History:   Past Medical History:   Diagnosis Date   • Disease of thyroid gland    • Esophageal dysphagia    • Goiter    • Hyperlipidemia    • Hypertension     no meds currently       Past Surgical History:  Past Surgical History:   Procedure Laterality Date   • COLONOSCOPY      2015   • FINGER SURGERY     • THYROID SURGERY         Family History:  Family History   Problem Relation Age of Onset   • Hypertension Mother    • Diabetes Mother    • Multiple myeloma Mother    • Hypertension Sister    • Heart disease Sister    • Hypertension Sister    • Diabetes Brother    • Hypertension Brother    • Liver cancer Brother    • Hypertension Brother    • Colon cancer Maternal Grandmother    • Malig Hyperthermia Neg Hx        Social History:   reports that she has never smoked. She has never used smokeless tobacco. She reports that she does not drink alcohol and does not use drugs.    Medications:   Medications Prior to Admission   Medication Sig Dispense Refill Last Dose   • ascorbic acid (VITAMIN C) 1000 MG tablet Take 1,000 mg by mouth Daily.   4/12/2022 at 0900   • aspirin 81 MG EC tablet Take 1 tablet by mouth Daily. (Patient taking differently: Take  by mouth Daily.)   4/12/2022 at 0900   • atorvastatin (LIPITOR) 10 MG tablet Take  "1 tablet by mouth Daily. 90 tablet 3 4/11/2022 at 2100   • [facility] POTASSIUM GLUCONATE PO Take  by mouth.   4/12/2022 at 0900   • Misc Natural Products (EQ GLUCOSAMINE-CHONDROITIN-MSM PO) Take  by mouth.   4/12/2022 at 0900   • Multiple Vitamin (MULTI-DAY VITAMINS PO) Take  by mouth.   4/12/2022 at 0900   • Vitamin D, Cholecalciferol, 25 MCG (1000 UT) capsule Take 2,000 Units by mouth.   4/12/2022 at 0900       Allergies:  Cephalexin    ROS:    Pertinent items are noted in HPI, all other systems reviewed and negative     Objective     Blood pressure 148/86, pulse 67, resp. rate 16, height 162.6 cm (64\"), weight 72.3 kg (159 lb 8 oz), SpO2 98 %.    Physical Exam   Constitutional: Pt is oriented to person, place, and time and well-developed, well-nourished, and in no distress.   Mouth/Throat: Oropharynx is clear and moist.   Neck: Normal range of motion.   Cardiovascular: Normal rate, regular rhythm    Pulmonary/Chest: Effort normal    Abdominal: Soft. Nontender  Skin: Skin is warm and dry.   Psychiatric: Mood, memory, affect and judgment normal.     Assessment/Plan     Diagnosis:  dysphagia    Anticipated Surgical Procedure:  egd with possible interventions    The risks, benefits, and alternatives of this procedure have been discussed with the patient or the responsible party- the patient understands and agrees to proceed.                                                              "

## 2022-04-14 LAB
LAB AP CASE REPORT: NORMAL
PATH REPORT.FINAL DX SPEC: NORMAL
PATH REPORT.GROSS SPEC: NORMAL

## 2022-04-15 RX ORDER — FLUCONAZOLE 100 MG/1
TABLET ORAL
Qty: 15 TABLET | Refills: 0 | Status: SHIPPED | OUTPATIENT
Start: 2022-04-15 | End: 2022-07-11

## 2022-04-15 NOTE — PROGRESS NOTES
Mild chronic inflammation seen in the stomach on biopsy.  Gastric polyp was benign.    Esophageal sample confirmed yeast in the esophagus - antifungal sent to pharmacy.    Also continue omeprazole 20 mg daily    Schedule 6 week f/u appt

## 2022-04-18 ENCOUNTER — TELEPHONE (OUTPATIENT)
Dept: GASTROENTEROLOGY | Facility: CLINIC | Age: 67
End: 2022-04-18

## 2022-04-18 NOTE — TELEPHONE ENCOUNTER
----- Message from Nayla Pickens MD sent at 4/15/2022  5:39 PM EDT -----  Mild chronic inflammation seen in the stomach on biopsy.  Gastric polyp was benign.    Esophageal sample confirmed yeast in the esophagus - antifungal sent to pharmacy.    Also continue omeprazole 20 mg daily    Schedule 6 week f/u appt

## 2022-04-18 NOTE — TELEPHONE ENCOUNTER
Called pt and advised of Dr Renee' note. Verb understanding.     F/u appt made for 05/31 at 830a with Zeinab VALDIVIA.

## 2022-05-17 LAB — FUNGUS WND CULT: ABNORMAL

## 2022-05-31 ENCOUNTER — OFFICE VISIT (OUTPATIENT)
Dept: GASTROENTEROLOGY | Facility: CLINIC | Age: 67
End: 2022-05-31

## 2022-05-31 VITALS
DIASTOLIC BLOOD PRESSURE: 83 MMHG | WEIGHT: 159 LBS | TEMPERATURE: 96.5 F | HEIGHT: 64 IN | SYSTOLIC BLOOD PRESSURE: 135 MMHG | BODY MASS INDEX: 27.14 KG/M2 | HEART RATE: 61 BPM

## 2022-05-31 DIAGNOSIS — R13.19 ESOPHAGEAL DYSPHAGIA: Primary | ICD-10-CM

## 2022-05-31 DIAGNOSIS — B37.81 ESOPHAGEAL CANDIDIASIS: ICD-10-CM

## 2022-05-31 DIAGNOSIS — K21.00 GASTROESOPHAGEAL REFLUX DISEASE WITH ESOPHAGITIS WITHOUT HEMORRHAGE: ICD-10-CM

## 2022-05-31 PROCEDURE — 99214 OFFICE O/P EST MOD 30 MIN: CPT | Performed by: PHYSICIAN ASSISTANT

## 2022-05-31 RX ORDER — OMEPRAZOLE 20 MG/1
20 CAPSULE, DELAYED RELEASE ORAL
Qty: 30 CAPSULE | Refills: 5 | Status: SHIPPED | OUTPATIENT
Start: 2022-05-31 | End: 2023-01-12 | Stop reason: SDUPTHER

## 2022-05-31 NOTE — PROGRESS NOTES
"Chief Complaint  Follow-up (dysphagia)    Subjective          History of Present Illness    Julissa Armenta is a  66 y.o. female presents for follow-up on trouble swallowing.  She is a patient of Dr. Pickens last seen for EGD in April.  She is new to me.    She underwent dilation of esophageal stricture and was started on omeprazole 20 mg daily for mild gastritis.  She also was found to have esophageal candidiasis and was given Diflucan 100 mg daily x14 days.  She is doing well today reports resolution in her trouble swallowing.  She reports feeling \"raw\" in esophagus.  She denies GERD or dyspepsia.  She denies abdominal pain, nausea, vomiting, melena hematochezia, weight loss, poor appetite, diarrhea, constipation.    She does not use inhalers, she is not diabetic or immunosuppressed that she is aware of.  She denies frequent thrush.  She does admit to eating lots of sugary things prior to the endoscopy and has backed off after she read that this could cause the candidiasis.    EGD on 4/13/2022 showed white nummular lesions lesions in the esophagus, benign esophageal stenosis-dilated to 18 mm balloon.  Mild gastritis and single gastric polyp.  Pathology showed mild chronic gastric inflammation and fundic gland polyp.  He is confirmed in the esophagus.    Last colonoscopy in 2016 which was normal.  10-year recall.      Objective   Vital Signs:   /83   Pulse 61   Temp 96.5 °F (35.8 °C)   Ht 162.6 cm (64\")   Wt 72.1 kg (159 lb)   BMI 27.29 kg/m²       Physical Exam  Vitals reviewed.   Constitutional:       General: She is awake. She is not in acute distress.     Appearance: Normal appearance. She is well-developed and well-groomed.   HENT:      Head: Normocephalic.   Pulmonary:      Effort: Pulmonary effort is normal. No respiratory distress.   Skin:     Coloration: Skin is not pale.   Neurological:      Mental Status: She is alert and oriented to person, place, and time.      Gait: Gait is intact. " "  Psychiatric:         Mood and Affect: Mood and affect normal.         Speech: Speech normal.         Behavior: Behavior is cooperative.         Judgment: Judgment normal.          Result Review :             Assessment and Plan    Diagnoses and all orders for this visit:    1. Esophageal dysphagia (Primary)    2. Esophageal candidiasis (HCC)    3. Gastroesophageal reflux disease with esophagitis without hemorrhage    Other orders  -     omeprazole (priLOSEC) 20 MG capsule; Take 1 capsule by mouth 2 (Two) Times a Day Before Meals.  Dispense: 30 capsule; Refill: 5    Her trouble swallowing has resolved with dilation.  We will increase omeprazole 20mg BID due \"raw feeling\" in esophagus.  He did complete the entire 14 days of Diflucan.    Follow Up   Return in about 7 weeks (around 7/18/2022) for Zeinab or Dr. Pickens.    Rhoda dictation used throughout this note.     Zeinab Ge PA-C   "

## 2022-07-11 ENCOUNTER — OFFICE VISIT (OUTPATIENT)
Dept: INTERNAL MEDICINE | Age: 67
End: 2022-07-11

## 2022-07-11 VITALS
OXYGEN SATURATION: 98 % | TEMPERATURE: 97.3 F | HEART RATE: 74 BPM | WEIGHT: 158.2 LBS | SYSTOLIC BLOOD PRESSURE: 120 MMHG | DIASTOLIC BLOOD PRESSURE: 70 MMHG | HEIGHT: 64 IN | BODY MASS INDEX: 27.01 KG/M2

## 2022-07-11 DIAGNOSIS — I10 PRIMARY HYPERTENSION: ICD-10-CM

## 2022-07-11 DIAGNOSIS — E04.2 MULTIPLE THYROID NODULES: ICD-10-CM

## 2022-07-11 DIAGNOSIS — R13.19 ESOPHAGEAL DYSPHAGIA: ICD-10-CM

## 2022-07-11 DIAGNOSIS — R73.9 HYPERGLYCEMIA: ICD-10-CM

## 2022-07-11 DIAGNOSIS — E78.5 HYPERLIPIDEMIA, UNSPECIFIED HYPERLIPIDEMIA TYPE: Primary | ICD-10-CM

## 2022-07-11 PROCEDURE — 0051A COVID-19 (PFIZER) 12+ YRS: CPT | Performed by: NURSE PRACTITIONER

## 2022-07-11 PROCEDURE — 91305 COVID-19 (PFIZER) 12+ YRS: CPT | Performed by: NURSE PRACTITIONER

## 2022-07-11 PROCEDURE — 99214 OFFICE O/P EST MOD 30 MIN: CPT | Performed by: NURSE PRACTITIONER

## 2022-07-11 RX ORDER — ATORVASTATIN CALCIUM 10 MG/1
10 TABLET, FILM COATED ORAL DAILY
Qty: 90 TABLET | Refills: 3 | Status: SHIPPED | OUTPATIENT
Start: 2022-07-11

## 2022-07-11 NOTE — PROGRESS NOTES
"    I N T E R N A L  M E D I C I N E  GABINO ANNE      ENCOUNTER DATE:  07/11/2022    Julissa CARLISLE Black / 66 y.o. / female      CHIEF COMPLAINT / REASON FOR OFFICE VISIT     Establish Care, Hypertension, and Hyperlipidemia      ASSESSMENT & PLAN     1. Hyperlipidemia, unspecified hyperlipidemia type  -Restart atorvastatin 10 mg  - Lipid Panel With / Chol / HDL Ratio  - atorvastatin (LIPITOR) 10 MG tablet; Take 1 tablet by mouth Daily.  Dispense: 90 tablet; Refill: 3    2. Primary hypertension  -Monitor blood pressure at least weekly at home, call if above 130/80  - Comprehensive Metabolic Panel  - CBC & Differential    3. Multiple thyroid nodules  - US Thyroid; Future  - TSH+Free T4    4. Esophageal dysphagia  -Continue management with gastroenterology, omeprazole 20 twice daily    5. Hyperglycemia  - Decrease/eliminate soda, caffeine, and overall caloric intake. Reduce carbohydrates and sweets in diet.  Continue to improve dietary habits with lean proteins, fresh vegetables, fruits, and nuts. Improve aerobic exercise: walking/biking/swimming daily as tolerated, recommend 30 minutes/day at least 5 days/week.  - Hemoglobin A1c    Orders Placed This Encounter   Procedures   • US Thyroid   • COVID-19 Vaccine (Pfizer) Gray Cap   • Comprehensive Metabolic Panel   • Hemoglobin A1c   • Lipid Panel With / Chol / HDL Ratio   • TSH+Free T4   • CBC & Differential     New Medications Ordered This Visit   Medications   • atorvastatin (LIPITOR) 10 MG tablet     Sig: Take 1 tablet by mouth Daily.     Dispense:  90 tablet     Refill:  3       SUMMARY/DISCUSSION  • Follow-up in 6 months for chronic medical, 1 year annual wellness    Next Appointment with me: Visit date not found    Return in about 6 months (around 1/11/2023) for Next scheduled follow up; 1 year wellness.      VITAL SIGNS     Visit Vitals  /70   Pulse 74   Temp 97.3 °F (36.3 °C) (Temporal)   Ht 162.6 cm (64\")   Wt 71.8 kg (158 lb 3.2 oz)   SpO2 98%   BMI " 27.15 kg/m²     Wt Readings from Last 3 Encounters:   07/11/22 71.8 kg (158 lb 3.2 oz)   05/31/22 72.1 kg (159 lb)   04/13/22 72.3 kg (159 lb 8 oz)     Body mass index is 27.15 kg/m².      MEDICATIONS AT THE TIME OF OFFICE VISIT     Current Outpatient Medications on File Prior to Visit   Medication Sig   • ascorbic acid (VITAMIN C) 1000 MG tablet Take 1,000 mg by mouth Daily.   • aspirin 81 MG EC tablet Take 1 tablet by mouth Daily. (Patient taking differently: Take  by mouth Daily.)   • [facility]S POTASSIUM GLUCONATE PO Take  by mouth.   • Misc Natural Products (EQ GLUCOSAMINE-CHONDROITIN-MSM PO) Take  by mouth.   • Multiple Vitamin (MULTI-DAY VITAMINS PO) Take  by mouth.   • omeprazole (priLOSEC) 20 MG capsule Take 1 capsule by mouth 2 (Two) Times a Day Before Meals.   • Vitamin D, Cholecalciferol, 25 MCG (1000 UT) capsule Take 2,000 Units by mouth.   • [DISCONTINUED] atorvastatin (LIPITOR) 10 MG tablet Take 1 tablet by mouth Daily.   • [DISCONTINUED] fluconazole (Diflucan) 100 MG tablet Take 2 by mouth on day 1 and then one by mouth daily for a total of 14 days     No current facility-administered medications on file prior to visit.         HISTORY OF PRESENT ILLNESS     Patient presents to establish care new provider in office.  Previous patient of Swetha Gato GABINO last seen January 2022 for hypertension, hyperlipidemia, multiple thyroid nodules, osteopenia,  impaired fasting glucose, followed every 6 months.    Hypertension: Monitoring, no medication treatment at this time, previous elevations as high as 200 systolic.    Hyperlipidemia: Previously well-controlled with atorvastatin 10 mg daily.  No elevation in liver enzymes.  No myalgias with medication.  She has been off her medication for approximately 6 months due to establishing with new PCP.    Impaired fasting glucose with last hemoglobin A1c of 5.7, attempt to practice healthy diet and exercise.    Last thyroid ultrasound completed in July 2021, showing  multiple nodules, nonconcerning, repeating ultrasound yearly.  No trouble swallowing.  Last TSH and free T4 within normal, negative thyroid antibodies.    Followed by Dr. Cross cardiology last seen July 2020 for hypertension, EKG showed sinus rhythm with nonspecific ST-T wave changes.  Palpitations classically described as PVCs, chronic, no treatment per cardiology.  Last echocardiogram 2018 showing EF of 63%.    Followed by Dr. Page OB/GYN, last seen by GABINO Gunn July 2021 for gynecological exam.  Mammogram July 2021 benign.  DEXA scan June 2020 showing osteopenia.  Recommended vitamin D, weightbearing exercises.    Followed by Dr. Pickens gastroenterology last seen by Zeinab VALDIVIA May 2022 for esophageal dysphagia, esophageal candidiasis, GERD with esophagitis without hemorrhage.  EGD performed in April 2022.  Underwent dilation of esophageal stricture at that time and started on omeprazole 20 mg for mild gastritis.  Treated for candidiasis with Diflucan, posttreatment resolution and dysphagia.  Last colonoscopy in 2016, repeat in 10 years.  At last office visit omeprazole was increased to 20 mg twice daily.    REVIEW OF SYSTEMS     Constitutional neg except per HPI   Resp neg  CV neg    PHYSICAL EXAMINATION     Physical Exam  Constitutional  No distress  Cardiovascular Rate  normal . Rhythm: regular . Heart sounds:  normal  Pulmonary/Chest  Effort normal. Breath sounds:  normal  Psychiatric  Alert. Judgment and thought content normal. Mood normal     REVIEWED DATA     Labs:   Lab Results   Component Value Date    GLUCOSE 91 01/17/2022    BUN 12 01/17/2022    CREATININE 0.86 01/17/2022    EGFRIFNONA 70 01/17/2022    EGFRIFAFRI 80 01/17/2022    BCR 14 01/17/2022    K 3.9 01/17/2022    CO2 25 01/17/2022    CALCIUM 9.3 01/17/2022    PROTENTOTREF 6.5 01/17/2022    ALBUMIN 4.1 01/17/2022    LABIL2 1.7 01/17/2022    AST 24 01/17/2022    ALT 15 01/17/2022     Lab Results   Component Value Date    HGBA1C 5.7  (H) 01/17/2022     Lab Results   Component Value Date    CHOL 183 11/15/2018    CHLPL 183 01/17/2022    TRIG 53 01/17/2022    HDL 75 01/17/2022    LDL 98 01/17/2022     Lab Results   Component Value Date    WBC 4.05 06/05/2020    HGB 12.4 06/05/2020    HCT 37.7 06/05/2020    MCV 92.6 06/05/2020     06/05/2020     Lab Results   Component Value Date    TSH 1.800 01/17/2022    Z1HPGTW 5.2 03/27/2017    THYROIDAB 10 12/03/2018     Imaging:           Medical Tests:             Summary of old records / correspondence / consultant report:           Request outside records:           *Examiner was wearing medical surgical mask, face shield and exam gloves during the entire duration of the visit. Patient was masked the entire time.   Minimum social distance of 6 ft maintained entire visit except if physical contact was necessary as documented.     Dictated utilizing Dragon dictation

## 2022-07-12 LAB
ALBUMIN SERPL-MCNC: 4.2 G/DL (ref 3.8–4.8)
ALBUMIN/GLOB SERPL: 1.6 {RATIO} (ref 1.2–2.2)
ALP SERPL-CCNC: 77 IU/L (ref 44–121)
ALT SERPL-CCNC: 17 IU/L (ref 0–32)
AST SERPL-CCNC: 24 IU/L (ref 0–40)
BASOPHILS # BLD AUTO: 0 X10E3/UL (ref 0–0.2)
BASOPHILS NFR BLD AUTO: 1 %
BILIRUB SERPL-MCNC: <0.2 MG/DL (ref 0–1.2)
BUN SERPL-MCNC: 20 MG/DL (ref 8–27)
BUN/CREAT SERPL: 20 (ref 12–28)
CALCIUM SERPL-MCNC: 9.5 MG/DL (ref 8.7–10.3)
CHLORIDE SERPL-SCNC: 105 MMOL/L (ref 96–106)
CHOLEST SERPL-MCNC: 218 MG/DL (ref 100–199)
CHOLEST/HDLC SERPL: 3.1 RATIO (ref 0–4.4)
CO2 SERPL-SCNC: 26 MMOL/L (ref 20–29)
CREAT SERPL-MCNC: 0.98 MG/DL (ref 0.57–1)
EGFRCR SERPLBLD CKD-EPI 2021: 63 ML/MIN/1.73
EOSINOPHIL # BLD AUTO: 0.1 X10E3/UL (ref 0–0.4)
EOSINOPHIL NFR BLD AUTO: 2 %
ERYTHROCYTE [DISTWIDTH] IN BLOOD BY AUTOMATED COUNT: 13.1 % (ref 11.7–15.4)
GLOBULIN SER CALC-MCNC: 2.6 G/DL (ref 1.5–4.5)
GLUCOSE SERPL-MCNC: 94 MG/DL (ref 65–99)
HBA1C MFR BLD: 5.8 % (ref 4.8–5.6)
HCT VFR BLD AUTO: 38.1 % (ref 34–46.6)
HDLC SERPL-MCNC: 70 MG/DL
HGB BLD-MCNC: 12.3 G/DL (ref 11.1–15.9)
IMM GRANULOCYTES # BLD AUTO: 0 X10E3/UL (ref 0–0.1)
IMM GRANULOCYTES NFR BLD AUTO: 0 %
LDLC SERPL CALC-MCNC: 138 MG/DL (ref 0–99)
LYMPHOCYTES # BLD AUTO: 1.6 X10E3/UL (ref 0.7–3.1)
LYMPHOCYTES NFR BLD AUTO: 29 %
MCH RBC QN AUTO: 29.4 PG (ref 26.6–33)
MCHC RBC AUTO-ENTMCNC: 32.3 G/DL (ref 31.5–35.7)
MCV RBC AUTO: 91 FL (ref 79–97)
MONOCYTES # BLD AUTO: 0.4 X10E3/UL (ref 0.1–0.9)
MONOCYTES NFR BLD AUTO: 8 %
NEUTROPHILS # BLD AUTO: 3.2 X10E3/UL (ref 1.4–7)
NEUTROPHILS NFR BLD AUTO: 60 %
PLATELET # BLD AUTO: 226 X10E3/UL (ref 150–450)
POTASSIUM SERPL-SCNC: 4.7 MMOL/L (ref 3.5–5.2)
PROT SERPL-MCNC: 6.8 G/DL (ref 6–8.5)
RBC # BLD AUTO: 4.19 X10E6/UL (ref 3.77–5.28)
SODIUM SERPL-SCNC: 141 MMOL/L (ref 134–144)
T4 FREE SERPL-MCNC: 1.01 NG/DL (ref 0.82–1.77)
TRIGL SERPL-MCNC: 59 MG/DL (ref 0–149)
TSH SERPL DL<=0.005 MIU/L-ACNC: 1.02 UIU/ML (ref 0.45–4.5)
VLDLC SERPL CALC-MCNC: 10 MG/DL (ref 5–40)
WBC # BLD AUTO: 5.4 X10E3/UL (ref 3.4–10.8)

## 2022-07-13 ENCOUNTER — PATIENT ROUNDING (BHMG ONLY) (OUTPATIENT)
Dept: INTERNAL MEDICINE | Age: 67
End: 2022-07-13

## 2022-07-13 ENCOUNTER — TELEPHONE (OUTPATIENT)
Dept: INTERNAL MEDICINE | Age: 67
End: 2022-07-13

## 2022-07-13 NOTE — TELEPHONE ENCOUNTER
HUB MAY READ TO PT  ----- Message from GABINO Jung sent at 7/12/2022  5:23 PM EDT -----  All labs are in acceptable ranges except for increasing cholesterol.  Please restart atorvastatin as we discussed at office visit.  Blood sugar is also elevated in the prediabetic range.  Please practice healthy diet low in simple sweets and carbohydrates.

## 2022-07-13 NOTE — PROGRESS NOTES
A My-Chart message has been sent to the patient for PATIENT ROUNDING with [facility]

## 2022-07-15 ENCOUNTER — PROCEDURE VISIT (OUTPATIENT)
Dept: OBSTETRICS AND GYNECOLOGY | Facility: CLINIC | Age: 67
End: 2022-07-15

## 2022-07-15 ENCOUNTER — OFFICE VISIT (OUTPATIENT)
Dept: OBSTETRICS AND GYNECOLOGY | Facility: CLINIC | Age: 67
End: 2022-07-15

## 2022-07-15 ENCOUNTER — APPOINTMENT (OUTPATIENT)
Dept: WOMENS IMAGING | Facility: HOSPITAL | Age: 67
End: 2022-07-15

## 2022-07-15 VITALS
BODY MASS INDEX: 26.98 KG/M2 | WEIGHT: 158 LBS | DIASTOLIC BLOOD PRESSURE: 88 MMHG | SYSTOLIC BLOOD PRESSURE: 149 MMHG | HEIGHT: 64 IN

## 2022-07-15 DIAGNOSIS — Z87.42 H/O ABNORMAL CERVICAL PAPANICOLAOU SMEAR: Primary | ICD-10-CM

## 2022-07-15 DIAGNOSIS — Z12.4 ENCOUNTER FOR SCREENING FOR MALIGNANT NEOPLASM OF CERVIX: ICD-10-CM

## 2022-07-15 DIAGNOSIS — Z12.11 COLON CANCER SCREENING: ICD-10-CM

## 2022-07-15 DIAGNOSIS — Z01.419 VISIT FOR GYNECOLOGIC EXAMINATION: ICD-10-CM

## 2022-07-15 DIAGNOSIS — Z12.31 VISIT FOR SCREENING MAMMOGRAM: Primary | ICD-10-CM

## 2022-07-15 DIAGNOSIS — M85.80 OSTEOPENIA AFTER MENOPAUSE: ICD-10-CM

## 2022-07-15 DIAGNOSIS — Z78.0 OSTEOPENIA AFTER MENOPAUSE: ICD-10-CM

## 2022-07-15 LAB
DEVELOPER EXPIRATION DATE: NORMAL
DEVELOPER LOT NUMBER: NORMAL
EXPIRATION DATE: NORMAL
FECAL OCCULT BLOOD SCREEN, POC: NEGATIVE
Lab: NORMAL
NEGATIVE CONTROL: NEGATIVE
POSITIVE CONTROL: POSITIVE

## 2022-07-15 PROCEDURE — 77067 SCR MAMMO BI INCL CAD: CPT | Performed by: OBSTETRICS & GYNECOLOGY

## 2022-07-15 PROCEDURE — 77067 SCR MAMMO BI INCL CAD: CPT | Performed by: RADIOLOGY

## 2022-07-15 PROCEDURE — 99212 OFFICE O/P EST SF 10 MIN: CPT | Performed by: OBSTETRICS & GYNECOLOGY

## 2022-07-15 PROCEDURE — 77063 BREAST TOMOSYNTHESIS BI: CPT | Performed by: RADIOLOGY

## 2022-07-15 PROCEDURE — 77063 BREAST TOMOSYNTHESIS BI: CPT | Performed by: OBSTETRICS & GYNECOLOGY

## 2022-07-15 PROCEDURE — G0328 FECAL BLOOD SCRN IMMUNOASSAY: HCPCS | Performed by: OBSTETRICS & GYNECOLOGY

## 2022-07-15 PROCEDURE — G0101 CA SCREEN;PELVIC/BREAST EXAM: HCPCS | Performed by: OBSTETRICS & GYNECOLOGY

## 2022-07-15 NOTE — PROGRESS NOTES
[facility]  [address], [address]  [place], [place] [address]  Phone: [phone] / Fax:  [phone]      07/15/2022    [address] [place] [id number]    Michael Jones APRN    Chief Complaint   Patient presents with   • Gynecologic Exam     Annual Exam, last pap 7-13-21 NL, 6-5-20 ASCUS HPV (-). Mammogram today, previous 7-13-21-NL. Colonoscopy 1-- NL. DEXA 7-22-20 Osteopenia.       Julissa Armenta is here for annual gynecologic exam.  HPI - Patient with history of ASCUS pap with HPV negative testing 2 years ago.  Her last mammogram was one year ago and was negative; she underwent screening again today.  Patient with normal colonoscopy 6 years ago and 10 year interval screening was recommended.  Patient with osteopenia on DEXA two years ago; she takes Calcium and Vitamin D.      Past Medical History:   Diagnosis Date   • Abnormal Pap smear of cervix 06/05/2020    ASCUS   • Disease of thyroid gland    • Esophageal dysphagia    • Goiter    • Hyperlipidemia     Teakes Medication   • Hypertension     no meds currently   • Osteopenia 07/22/2020       Past Surgical History:   Procedure Laterality Date   • COLONOSCOPY      2015   • ENDOSCOPY N/A 4/13/2022    Procedure: ESOPHAGOGASTRODUODENOSCOPY WITH BIOPSIES AND HOT SNARE POLYPECTOMY AND BRUSHING AND 12MM- 18MM BALLOON DILATION;  Surgeon: Nayla Pickens MD;  Location: [facility] ENDOSCOPY;  Service: Gastroenterology;  Laterality: N/A;  PRE- DYSPHAGIA  POST- RULE OUT CANDIDA, GASTRIC POLYP, GASTRITIS, ESOPHAEAL STRICTURE   • FINGER SURGERY     • THYROID SURGERY         Allergies   Allergen Reactions   • Cephalexin Rash       Social History     Socioeconomic History   • Marital status: Single   Tobacco Use   • Smoking status: Never Smoker   • Smokeless tobacco: Never Used   Vaping Use   • Vaping Use: Never used   Substance and Sexual Activity   • Alcohol use: No   • Drug use: No   • Sexual activity: Not Currently     Partners: Male     " Birth control/protection: Post-menopausal       Family History   Problem Relation Age of Onset   • Hypertension Mother    • Diabetes Mother    • Multiple myeloma Mother    • Diabetes Brother    • Hypertension Brother    • Liver cancer Brother    • Hypertension Brother    • Hypertension Sister    • Heart disease Sister    • Hypertension Sister    • Colon cancer Maternal Grandmother    • Malig Hyperthermia Neg Hx    • Breast cancer Neg Hx        No LMP recorded. Patient is postmenopausal.    OB History        2    Para   1    Term   1            AB   1    Living           SAB   1    IAB        Ectopic        Molar        Multiple        Live Births   1                Vitals:    07/15/22 0904   BP: 149/88   Weight: 71.7 kg (158 lb)   Height: 162.6 cm (64.02\")       Physical Exam  Constitutional:       Appearance: Normal appearance. She is well-developed.   Genitourinary:      Bladder, rectum and urethral meatus normal.      Right Labia: No tenderness or lesions.     Left Labia: No tenderness or lesions.     No vaginal discharge or tenderness.        Right Adnexa: not tender and not full.     Left Adnexa: not tender and not full.     No cervical motion tenderness or lesion.      Uterus is not enlarged or tender.      No urethral tenderness present.   Rectum:      No rectal mass or tenderness.   Breasts:      Right: No mass or nipple discharge.      Left: No mass or nipple discharge.       HENT:      Right Ear: External ear normal.      Left Ear: External ear normal.      Nose: Nose normal.   Eyes:      Conjunctiva/sclera: Conjunctivae normal.   Neck:      Thyroid: No thyromegaly.   Cardiovascular:      Rate and Rhythm: Normal rate and regular rhythm.      Heart sounds: Normal heart sounds.   Pulmonary:      Effort: Pulmonary effort is normal.      Breath sounds: No stridor. No wheezing.   Abdominal:      Palpations: Abdomen is soft. There is no mass.      Tenderness: There is no guarding or rebound. "   Musculoskeletal:         General: Normal range of motion.      Cervical back: Normal range of motion and neck supple.   Neurological:      Mental Status: She is alert.      Coordination: Coordination normal.   Skin:     General: Skin is warm and dry.   Psychiatric:         Mood and Affect: Mood normal.         Behavior: Behavior normal.         Thought Content: Thought content normal.         Judgment: Judgment normal.   Vitals reviewed. Exam conducted with a chaperone present.         Diagnoses and all orders for this visit:    1. H/O abnormal cervical Papanicolaou smear/Encounter for screening for malignant neoplasm of cervix   -     IGP, Rfx Aptima HPV ASCU  -     After 3 normal pap tests, patient can forego further screening.    2. Visit for gynecologic examination        -     Discussed importance of regular screening and breast awareness.        -     Discussed diet and nutrition for wellness and improvement in lipids and blood pressure.        -     Discussed vaccines as prevention against disease; patient has been vaccinated against Covid 19.    3. Colon cancer screening        -     FOBT negative.  Screening up to date.    4. Osteopenia after menopause        -     Reinforced importance of Calcium and Vitamin D.  PCP recommends DEXA in one year.      Ray Page MD

## 2022-07-25 ENCOUNTER — TELEPHONE (OUTPATIENT)
Dept: OBSTETRICS AND GYNECOLOGY | Facility: CLINIC | Age: 67
End: 2022-07-25

## 2022-07-25 NOTE — TELEPHONE ENCOUNTER
Spoke with patient, she is aware of Nl pap smear. 7-26-22/lw    Left message for patient to call back. 7-25-22/lw     ----- Message from Ray Page MD sent at 7/19/2022 10:08 AM EDT -----  LAW - Let her know that her pap was normal this year.

## 2022-08-01 ENCOUNTER — HOSPITAL ENCOUNTER (OUTPATIENT)
Dept: ULTRASOUND IMAGING | Facility: HOSPITAL | Age: 67
Discharge: HOME | End: 2022-08-01
Admitting: NURSE PRACTITIONER

## 2022-08-01 DIAGNOSIS — E04.2 MULTIPLE THYROID NODULES: ICD-10-CM

## 2022-08-01 PROCEDURE — 76536 US EXAM OF HEAD AND NECK: CPT

## 2023-01-12 ENCOUNTER — OFFICE VISIT (OUTPATIENT)
Dept: INTERNAL MEDICINE | Age: 68
End: 2023-01-12
Payer: MEDICARE

## 2023-01-12 VITALS
SYSTOLIC BLOOD PRESSURE: 130 MMHG | TEMPERATURE: 96.9 F | HEIGHT: 64 IN | DIASTOLIC BLOOD PRESSURE: 86 MMHG | WEIGHT: 159.4 LBS | BODY MASS INDEX: 27.21 KG/M2 | HEART RATE: 74 BPM | OXYGEN SATURATION: 100 %

## 2023-01-12 DIAGNOSIS — I10 PRIMARY HYPERTENSION: ICD-10-CM

## 2023-01-12 DIAGNOSIS — E78.5 HYPERLIPIDEMIA, UNSPECIFIED HYPERLIPIDEMIA TYPE: Primary | ICD-10-CM

## 2023-01-12 DIAGNOSIS — R73.03 PREDIABETES: ICD-10-CM

## 2023-01-12 DIAGNOSIS — K21.00 GASTROESOPHAGEAL REFLUX DISEASE WITH ESOPHAGITIS WITHOUT HEMORRHAGE: ICD-10-CM

## 2023-01-12 DIAGNOSIS — Z78.0 POSTMENOPAUSAL: ICD-10-CM

## 2023-01-12 DIAGNOSIS — M85.80 OSTEOPENIA, UNSPECIFIED LOCATION: ICD-10-CM

## 2023-01-12 DIAGNOSIS — E55.9 VITAMIN D DEFICIENCY: ICD-10-CM

## 2023-01-12 DIAGNOSIS — E04.2 MULTIPLE THYROID NODULES: ICD-10-CM

## 2023-01-12 PROBLEM — R13.19 ESOPHAGEAL DYSPHAGIA: Status: RESOLVED | Noted: 2022-03-08 | Resolved: 2023-01-12

## 2023-01-12 PROBLEM — G43.109 COMPLICATED MIGRAINE: Status: RESOLVED | Noted: 2018-11-14 | Resolved: 2023-01-12

## 2023-01-12 PROBLEM — M81.0 AGE-RELATED OSTEOPOROSIS WITHOUT CURRENT PATHOLOGICAL FRACTURE: Status: RESOLVED | Noted: 2018-12-03 | Resolved: 2023-01-12

## 2023-01-12 LAB
ALBUMIN SERPL-MCNC: 4.5 G/DL (ref 3.5–5.2)
ALBUMIN/GLOB SERPL: 1.7 G/DL
ALP SERPL-CCNC: 88 U/L (ref 39–117)
ALT SERPL-CCNC: 13 U/L (ref 1–33)
AST SERPL-CCNC: 14 U/L (ref 1–32)
BILIRUB SERPL-MCNC: 0.3 MG/DL (ref 0–1.2)
BUN SERPL-MCNC: 12 MG/DL (ref 8–23)
BUN/CREAT SERPL: 11.8 (ref 7–25)
CALCIUM SERPL-MCNC: 9.9 MG/DL (ref 8.6–10.5)
CHLORIDE SERPL-SCNC: 103 MMOL/L (ref 98–107)
CHOLEST SERPL-MCNC: 246 MG/DL (ref 0–200)
CHOLEST/HDLC SERPL: 3.37 {RATIO}
CO2 SERPL-SCNC: 30.7 MMOL/L (ref 22–29)
CREAT SERPL-MCNC: 1.02 MG/DL (ref 0.57–1)
EGFRCR SERPLBLD CKD-EPI 2021: 59.7 ML/MIN/1.73
GLOBULIN SER CALC-MCNC: 2.7 GM/DL
GLUCOSE SERPL-MCNC: 93 MG/DL (ref 65–99)
HBA1C MFR BLD: 5.8 % (ref 4.8–5.6)
HDLC SERPL-MCNC: 73 MG/DL (ref 40–60)
LDLC SERPL CALC-MCNC: 165 MG/DL (ref 0–100)
POTASSIUM SERPL-SCNC: 4.4 MMOL/L (ref 3.5–5.2)
PROT SERPL-MCNC: 7.2 G/DL (ref 6–8.5)
SODIUM SERPL-SCNC: 142 MMOL/L (ref 136–145)
T4 FREE SERPL-MCNC: 1.18 NG/DL (ref 0.93–1.7)
TRIGL SERPL-MCNC: 50 MG/DL (ref 0–150)
TSH SERPL DL<=0.005 MIU/L-ACNC: 1.57 UIU/ML (ref 0.27–4.2)
VLDLC SERPL CALC-MCNC: 8 MG/DL (ref 5–40)

## 2023-01-12 PROCEDURE — 99214 OFFICE O/P EST MOD 30 MIN: CPT | Performed by: NURSE PRACTITIONER

## 2023-01-12 RX ORDER — OMEPRAZOLE 20 MG/1
20 CAPSULE, DELAYED RELEASE ORAL
Qty: 30 CAPSULE | Refills: 5
Start: 2023-01-12

## 2023-01-12 RX ORDER — FAMOTIDINE 20 MG
2000 TABLET ORAL DAILY
Qty: 60 CAPSULE
Start: 2023-01-12

## 2023-01-12 NOTE — PROGRESS NOTES
I N T E R N A L  M E D I C I N E  GABINO ANNE      ENCOUNTER DATE:  01/12/2023    Julissa CARLISLE Black / 67 y.o. / female      CHIEF COMPLAINT / REASON FOR OFFICE VISIT     Hypertension, Hyperlipidemia, thyroid nodule, and osteopenia      ASSESSMENT & PLAN     Problem List Items Addressed This Visit        Cardiac and Vasculature    Hypertensive disorder    Relevant Orders    Comprehensive Metabolic Panel    Hyperlipidemia - Primary    Relevant Medications    atorvastatin (LIPITOR) 10 MG tablet    Other Relevant Orders    Lipid Panel With / Chol / HDL Ratio       Endocrine and Metabolic    Multiple thyroid nodules    Relevant Orders    US Thyroid    TSH+Free T4    Vitamin D deficiency    Relevant Medications    Vitamin D, Cholecalciferol, 25 MCG (1000 UT) capsule    Prediabetes    Relevant Orders    Hemoglobin A1c       Musculoskeletal and Injuries    Osteopenia    Relevant Orders    DEXA Bone Density Axial       Other    Gastroesophageal reflux disease with esophagitis without hemorrhage    Relevant Medications    omeprazole (priLOSEC) 20 MG capsule   Other Visit Diagnoses     Postmenopausal        Relevant Orders    DEXA Bone Density Axial        Orders Placed This Encounter   Procedures   • DEXA Bone Density Axial   • US Thyroid   • Comprehensive Metabolic Panel   • Lipid Panel With / Chol / HDL Ratio   • TSH+Free T4   • Hemoglobin A1c     New Medications Ordered This Visit   Medications   • omeprazole (priLOSEC) 20 MG capsule     Sig: Take 1 capsule by mouth 2 (Two) Times a Day Before Meals.     Dispense:  30 capsule     Refill:  5   • Vitamin D, Cholecalciferol, 25 MCG (1000 UT) capsule     Sig: Take 2 capsules by mouth Daily.     Dispense:  60 capsule       SUMMARY/DISCUSSION  • Follow-up as scheduled, earlier if needed    Next Appointment with me: Visit date not found    No follow-ups on file.      VITAL SIGNS     Visit Vitals  /86 (Cuff Size: Adult)   Pulse 74   Temp 96.9 °F (36.1 °C) (Temporal)  "  Ht 162.6 cm (64\")   Wt 72.3 kg (159 lb 6.4 oz)   SpO2 100%   BMI 27.36 kg/m²       @BP@  Wt Readings from Last 3 Encounters:   01/12/23 72.3 kg (159 lb 6.4 oz)   07/15/22 71.7 kg (158 lb)   07/11/22 71.8 kg (158 lb 3.2 oz)     Body mass index is 27.36 kg/m².      MEDICATIONS AT THE TIME OF OFFICE VISIT     Current Outpatient Medications on File Prior to Visit   Medication Sig   • ascorbic acid (VITAMIN C) 1000 MG tablet Take 1,000 mg by mouth Daily.   • aspirin 81 MG EC tablet Take 1 tablet by mouth Daily. (Patient taking differently: Take  by mouth Daily.)   • [employer] POTASSIUM GLUCONATE PO Take  by mouth.   • Misc Natural Products (EQ GLUCOSAMINE-CHONDROITIN-MSM PO) Take  by mouth.   • Multiple Vitamin (MULTI-DAY VITAMINS PO) Take  by mouth.   • [DISCONTINUED] omeprazole (priLOSEC) 20 MG capsule Take 1 capsule by mouth 2 (Two) Times a Day Before Meals.   • [DISCONTINUED] Vitamin D, Cholecalciferol, 25 MCG (1000 UT) capsule Take 2,000 Units by mouth.   • atorvastatin (LIPITOR) 10 MG tablet Take 1 tablet by mouth Daily.     No current facility-administered medications on file prior to visit.          HISTORY OF PRESENT ILLNESS     Osteopenia last DEXA scan with vitamin D deficiency on 2000 units daily.  Due for updated DEXA scan.     Hypertension monitored at home, today 118/76.  No elevated readings at home.  Controlled with diet and exercise.    Hyperlipidemia, patient has restarted her atorvastatin but only approximately 3 days weekly.     Prediabetes with no medication treatment at this time.  Last hemoglobin A1c of 5.8.     GERD with esophagitis, patient is only taking her omeprazole 20 mg approximately 3 days weekly.  No trouble swallowing.  No blood in the stool or dark stools    Updated thyroid ultrasound will be due August of this year to monitor stable thyroid nodules.      REVIEW OF SYSTEMS     Constitutional neg except per HPI   Resp neg  CV neg    PHYSICAL EXAMINATION     Physical Exam  Constitutional  No " distress  Cardiovascular Rate  normal . Rhythm: regular . Heart sounds:  normal  Pulmonary/Chest  Effort normal. Breath sounds:  normal  Psychiatric  Alert. Judgment and thought content normal. Mood normal     REVIEWED DATA     Labs:   Lab Results   Component Value Date    GLUCOSE 94 07/11/2022    BUN 20 07/11/2022    CREATININE 0.98 07/11/2022    EGFRIFNONA 70 01/17/2022    EGFRIFAFRI 80 01/17/2022    BCR 20 07/11/2022    K 4.7 07/11/2022    CO2 26 07/11/2022    CALCIUM 9.5 07/11/2022    PROTENTOTREF 6.8 07/11/2022    ALBUMIN 4.2 07/11/2022    LABIL2 1.6 07/11/2022    AST 24 07/11/2022    ALT 17 07/11/2022     Lab Results   Component Value Date    WBC 5.4 07/11/2022    HGB 12.3 07/11/2022    HCT 38.1 07/11/2022    MCV 91 07/11/2022     07/11/2022     Lab Results   Component Value Date    TSH 1.020 07/11/2022    W0LDCNG 5.2 03/27/2017    THYROIDAB 10 12/03/2018     Lab Results   Component Value Date    HGBA1C 5.8 (H) 07/11/2022     Lab Results   Component Value Date    CHOL 183 11/15/2018    CHLPL 218 (H) 07/11/2022    TRIG 59 07/11/2022    HDL 70 07/11/2022     (H) 07/11/2022       Imaging:           Medical Tests:           Summary of old records / correspondence / consultant report:           Request outside records:             *Examiner was wearing medical surgical mask.   **Dragon dictation used for documentation

## 2023-07-25 ENCOUNTER — OFFICE VISIT (OUTPATIENT)
Dept: CARDIOLOGY | Facility: CLINIC | Age: 68
End: 2023-07-25
Payer: MEDICARE

## 2023-07-25 VITALS
HEIGHT: 63 IN | WEIGHT: 159 LBS | BODY MASS INDEX: 28.17 KG/M2 | DIASTOLIC BLOOD PRESSURE: 82 MMHG | SYSTOLIC BLOOD PRESSURE: 130 MMHG | HEART RATE: 56 BPM

## 2023-07-25 DIAGNOSIS — E78.5 HYPERLIPIDEMIA, UNSPECIFIED HYPERLIPIDEMIA TYPE: ICD-10-CM

## 2023-07-25 DIAGNOSIS — I10 PRIMARY HYPERTENSION: Primary | ICD-10-CM

## 2023-07-25 PROCEDURE — 99213 OFFICE O/P EST LOW 20 MIN: CPT | Performed by: NURSE PRACTITIONER

## 2023-07-25 PROCEDURE — 1159F MED LIST DOCD IN RCRD: CPT | Performed by: NURSE PRACTITIONER

## 2023-07-25 PROCEDURE — 3079F DIAST BP 80-89 MM HG: CPT | Performed by: NURSE PRACTITIONER

## 2023-07-25 PROCEDURE — 3075F SYST BP GE 130 - 139MM HG: CPT | Performed by: NURSE PRACTITIONER

## 2023-07-25 PROCEDURE — 1160F RVW MEDS BY RX/DR IN RCRD: CPT | Performed by: NURSE PRACTITIONER

## 2023-07-25 PROCEDURE — 93000 ELECTROCARDIOGRAM COMPLETE: CPT | Performed by: NURSE PRACTITIONER

## 2023-07-25 NOTE — PROGRESS NOTES
"    CARDIOLOGY        Patient Name: Julissa Armenta  :1955  Age: 67 y.o.  Primary Cardiologist: Harry Cross MD  Encounter Provider:  GABINO Reed    Date of Service: 23      CHIEF COMPLAINT / REASON FOR OFFICE VISIT     Essential hypertension (2020 Follow up)      HISTORY OF PRESENT ILLNESS       HPI  Julissa Armenta is a 67 y.o. female who presents today for evaluation of hypertension.  Patient was previously been evaluated by Dr. Cross but has not been evaluated in clinic since 2020.    Pt has a  history significant for hypertension.    Patient was recently evaluated for a Medicare wellness exam by PCP on 2023; at that time blood pressure controlled at 122/74.    Patient reports that she monitors blood pressure very routinely at home.  Blood pressure has been averaging with a maximum of 130 systolic.  At recent physician appointments blood pressure has also been controlled.  Patient is very active and exercises in the form of walking 8 to 10 miles at least 3 days weekly.  She is asymptomatic and denies chest pain, dyspnea at rest or with exertion, palpitations, lightheadedness, edema, fatigue.  Patient was recently diagnosed with hyperlipidemia and placed on atorvastatin 20 mg/day by her primary care provider.  She has not experienced any myalgias or adverse effects since adding this medication.      The following portions of the patient's history were reviewed and updated as appropriate: allergies, current medications, past family history, past medical history, past social history, past surgical history and problem list.      VITAL SIGNS     Visit Vitals  /82 Comment: Max at home per diary   Pulse 56   Ht 160 cm (63\")   Wt 72.1 kg (159 lb)   BMI 28.17 kg/m²         Wt Readings from Last 3 Encounters:   23 72.1 kg (159 lb)   23 72.1 kg (159 lb)   23 73.1 kg (161 lb 3.2 oz)     Body mass index is 28.17 kg/m².      REVIEW OF SYSTEMS   Review " of Systems   Constitutional: Negative for chills, fever, weight gain and weight loss.   Cardiovascular:  Negative for leg swelling.   Respiratory:  Negative for cough, snoring and wheezing.    Hematologic/Lymphatic: Negative for bleeding problem. Does not bruise/bleed easily.   Skin:  Negative for color change.   Musculoskeletal:  Negative for falls, joint pain and myalgias.   Gastrointestinal:  Negative for melena.   Genitourinary:  Negative for hematuria.   Neurological:  Negative for excessive daytime sleepiness.   Psychiatric/Behavioral:  Negative for depression. The patient is not nervous/anxious.          PHYSICAL EXAMINATION     Vitals and nursing note reviewed.   Constitutional:       Appearance: Normal appearance. Well-developed.   Eyes:      Conjunctiva/sclera: Conjunctivae normal.   Neck:      Vascular: No carotid bruit.   Pulmonary:      Breath sounds: Normal breath sounds.   Cardiovascular:      Normal rate. Regular rhythm. Normal S1 with normal intensity. Normal S2 with normal intensity.       Murmurs: There is no murmur.      No gallop.  No click. No rub.   Edema:     Peripheral edema absent.   Musculoskeletal: Normal range of motion. Skin:     General: Skin is warm and dry.   Neurological:      Mental Status: Alert and oriented to person, place, and time.      GCS: GCS eye subscore is 4. GCS verbal subscore is 5. GCS motor subscore is 6.   Psychiatric:         Speech: Speech normal.         Behavior: Behavior normal.         Thought Content: Thought content normal.         Judgment: Judgment normal.         REVIEWED DATA       ECG 12 Lead    Date/Time: 7/25/2023 8:51 AM  Performed by: Nayla Damon APRN  Authorized by: Nayla Damon APRN   Comparison: compared with previous ECG from 7/17/2020  Rhythm: sinus bradycardia  Rate: bradycardic  BPM: 56  Conduction: conduction normal  ST Segments: ST segments normal  T Waves: T waves normal  QRS axis: normal    Clinical impression: normal  ECG        Cardiac Procedures:  Echocardiogram 11/15/2018.  EF 63%.  Normal LV cavity size.  Mild LVH.  Diastolic function is normal.  Mild thickening of the aortic valve without regurgitation or stenosis.  Trace mitral valve regurgitation.  Trace tricuspid valve regurgitation.  Normal left atrial volume noted.  Saline test results are negative.    Lipid Panel          1/12/2023    08:10 7/12/2023    09:03   Lipid Panel   Total Cholesterol 246  222    Triglycerides 50  48    HDL Cholesterol 73  78    VLDL Cholesterol 8  8    LDL Cholesterol  165  136        Lab Results   Component Value Date     07/12/2023     01/12/2023    K 4.6 07/12/2023    K 4.4 01/12/2023     (H) 07/12/2023     01/12/2023    CO2 28.1 07/12/2023    CO2 30.7 (H) 01/12/2023    BUN 17 07/12/2023    BUN 12 01/12/2023    CREATININE 0.92 07/12/2023    CREATININE 1.02 (H) 01/12/2023    EGFRIFNONA 70 01/17/2022    EGFRIFNONA 73 07/15/2021    EGFRIFAFRI 80 01/17/2022    EGFRIFAFRI 88 07/15/2021    GLUCOSE 94 07/12/2023    GLUCOSE 93 01/12/2023    CALCIUM 9.6 07/12/2023    CALCIUM 9.9 01/12/2023    PROTENTOTREF 6.5 07/12/2023    PROTENTOTREF 7.2 01/12/2023    ALBUMIN 4.4 07/12/2023    ALBUMIN 4.5 01/12/2023    BILITOT <0.2 07/12/2023    BILITOT 0.3 01/12/2023    AST 21 07/12/2023    AST 14 01/12/2023    ALT 16 07/12/2023    ALT 13 01/12/2023     Lab Results   Component Value Date    WBC 4.25 07/12/2023    WBC 5.4 07/11/2022    HGB 12.4 07/12/2023    HGB 12.3 07/11/2022    HCT 37.5 07/12/2023    HCT 38.1 07/11/2022    MCV 89.3 07/12/2023    MCV 91 07/11/2022     07/12/2023     07/11/2022     No results found for: PROBNP, BNP  Lab Results   Component Value Date    TROPONINT <0.010 11/14/2018     Lab Results   Component Value Date    TSH 1.420 07/12/2023    TSH 1.570 01/12/2023             ASSESSMENT & PLAN     Diagnoses and all orders for this visit:    1. Primary hypertension (Primary)  Blood pressure slightly elevated  in clinic today at 142/84, however it has been controlled at recent doctors appointments.  Average at home is in the 130s for the maximum  Continue to monitor blood pressure  -     ECG 12 Lead    2. Hyperlipidemia, unspecified hyperlipidemia type  LDL greater than 100 at most recent assessment  Atorvastatin added by primary care physician which I agree with.  Patient educated on the importance of LDL at goal.        Return in about 1 year (around 7/25/2024) for Dr. Cross- Routine.        Future Appointments         Provider Department Center    8/4/2023 8:00 AM [facility] DEXA 1 [facility] DEXA [facility]    8/4/2023 8:30 AM [facility] US 3 [facility] US [facility]    8/16/2023 8:00 AM Michael Jones APRN [facility] PRIMARY CARE [facility]    7/19/2024 9:30 AM MAMMOGRAM LOBGYSAM MARTIN [facility] OBGYN [facility]    7/19/2024 9:45 AM Ray Page MD [facility] OBGYN [facility]    7/29/2024 8:00 AM Harry Cross MD [facility] CARDIOLOGY [facility]                  MEDICATIONS         Discharge Medications            Accurate as of July 25, 2023  9:25 AM. If you have any questions, ask your nurse or doctor.                Continue These Medications        Instructions Start Date   ascorbic acid 1000 MG tablet  Commonly known as: VITAMIN C   1,000 mg, Oral, Daily      atorvastatin 20 MG tablet  Commonly known as: LIPITOR   20 mg, Oral, Daily      [employer] POTASSIUM GLUCONATE PO   Oral      EQ GLUCOSAMINE-CHONDROITIN-MSM PO   Oral      multivitamin tablet tablet  Commonly known as: THERAGRAN   Oral      omeprazole 20 MG capsule  Commonly known as: priLOSEC   20 mg, Oral, 2 Times Daily Before Meals      Vitamin D (Cholecalciferol) 25 MCG (1000 UT) capsule   50 mcg, Oral, Daily             Stop These Medications      aspirin 81 MG EC tablet  Stopped by: GABINO Reed                  **Dragon Disclaimer:   Much of this encounter note is an electronic  transcription/translation of spoken language to printed text. The electronic translation of spoken language may permit erroneous, or at times, nonsensical words or phrases to be inadvertently transcribed. Although I have reviewed the note for such errors, some may still exist.

## 2023-07-28 ENCOUNTER — CLINICAL SUPPORT (OUTPATIENT)
Dept: CARDIOLOGY | Facility: CLINIC | Age: 68
End: 2023-07-28
Payer: MEDICARE

## 2023-07-28 VITALS — SYSTOLIC BLOOD PRESSURE: 142 MMHG | DIASTOLIC BLOOD PRESSURE: 86 MMHG

## 2023-07-28 NOTE — PROGRESS NOTES
Patient here for blood pressure check.    She brought log from home, as well as her home cuff.    Her electronic cuff read 140/94.    Our manual cuff was 142/86.    Readings from home are below:

## 2023-08-04 ENCOUNTER — HOSPITAL ENCOUNTER (OUTPATIENT)
Dept: ULTRASOUND IMAGING | Facility: HOSPITAL | Age: 68
Discharge: HOME | End: 2023-08-04
Payer: MEDICARE

## 2023-08-04 ENCOUNTER — HOSPITAL ENCOUNTER (OUTPATIENT)
Dept: BONE DENSITY | Facility: HOSPITAL | Age: 68
Discharge: HOME | End: 2023-08-04
Payer: MEDICARE

## 2023-08-04 DIAGNOSIS — M85.80 OSTEOPENIA, UNSPECIFIED LOCATION: ICD-10-CM

## 2023-08-04 DIAGNOSIS — Z78.0 POSTMENOPAUSAL: ICD-10-CM

## 2023-08-04 DIAGNOSIS — E04.2 MULTIPLE THYROID NODULES: ICD-10-CM

## 2023-08-04 PROCEDURE — 77080 DXA BONE DENSITY AXIAL: CPT

## 2023-08-04 PROCEDURE — 76536 US EXAM OF HEAD AND NECK: CPT

## 2023-08-10 ENCOUNTER — TELEPHONE (OUTPATIENT)
Dept: OBSTETRICS AND GYNECOLOGY | Facility: CLINIC | Age: 68
End: 2023-08-10
Payer: MEDICARE

## 2023-08-16 ENCOUNTER — OFFICE VISIT (OUTPATIENT)
Dept: INTERNAL MEDICINE | Age: 68
End: 2023-08-16
Payer: MEDICARE

## 2023-08-16 VITALS
DIASTOLIC BLOOD PRESSURE: 82 MMHG | TEMPERATURE: 98.6 F | HEART RATE: 74 BPM | OXYGEN SATURATION: 98 % | WEIGHT: 160.4 LBS | SYSTOLIC BLOOD PRESSURE: 130 MMHG | HEIGHT: 63 IN | BODY MASS INDEX: 28.42 KG/M2

## 2023-08-16 DIAGNOSIS — Z00.00 MEDICARE ANNUAL WELLNESS VISIT, SUBSEQUENT: Primary | ICD-10-CM

## 2023-08-16 DIAGNOSIS — M81.0 OSTEOPOROSIS, UNSPECIFIED OSTEOPOROSIS TYPE, UNSPECIFIED PATHOLOGICAL FRACTURE PRESENCE: ICD-10-CM

## 2023-08-16 RX ORDER — ALENDRONATE SODIUM 70 MG/1
70 TABLET ORAL
Qty: 12 TABLET | Refills: 3 | Status: SHIPPED | OUTPATIENT
Start: 2023-08-16

## 2023-08-16 NOTE — PROGRESS NOTES
"    I N T E R N A L  M E D I C I N E  GABINO ANNE      ENCOUNTER DATE:  08/16/2023    Julissa CARLISLE Black / 67 y.o. / female        MEDICARE ANNUAL WELLNESS VISIT       Chief Complaint: Medicare Wellness-subsequent and Osteoporosis       Patient's general assessment of her health since a year ago:     - Compared to one year ago, she feels her physical health is about the same without significant change.    - Compared to one year ago, she feels her mental health is about the same without significant change.      HPI for other active medical problems:     Pap smear completed July 17, 2023 by OB/GYN.  DEXA scan showed osteoporosis August 4, 2023.  Recommended Prolia at that time due to previous esophageal stenosis that has been dilated but patient would really like to proceed with Fosamax and determine if she has any side effects that she would not like to proceed with the injection.    Recent ultrasound of the thyroid showing post right thyroidectomy removal with no suspicious lesion.  Left-sided nodules require no further follow-up.    Last colonoscopy in 2016 with 10-year recall recommended.    Mammogram benign July 2023.    * The required components of Health Risk Assessment (HRA) that were completed by the patient and/or my staff are contained within this note and in the scanned documents titled \"Health Risk Assessment\" within the media section of the patient's chart in [employer].         HISTORY       Recent Hospitalizations:    Recent hospitalization?: No    If YES, location, date, and diagnoses:     Location:   Date:   Principle Discharge Dx:   Secondary Dx:       Patient Care Team:    Patient Care Team:  Michael Jones APRN as PCP - General (Internal Medicine)  Harry Cross MD as Consulting Physician (Cardiology)  Ray Page MD as Consulting Physician (Obstetrics and Gynecology)  Nayla Pickens MD as Consulting Physician " (Gastroenterology)      Allergies:  Cephalexin    Medications:  Current Outpatient Medications on File Prior to Visit   Medication Sig Dispense Refill    ascorbic acid (VITAMIN C) 1000 MG tablet Take 1 tablet by mouth Daily.      atorvastatin (LIPITOR) 20 MG tablet Take 1 tablet by mouth Daily. 90 tablet 3    [facility] POTASSIUM GLUCONATE PO Take  by mouth.      Misc Natural Products (EQ GLUCOSAMINE-CHONDROITIN-MSM PO) Take  by mouth.      Multiple Vitamin (MULTI-DAY VITAMINS PO) Take  by mouth.      omeprazole (priLOSEC) 20 MG capsule Take 1 capsule by mouth 2 (Two) Times a Day Before Meals. 30 capsule 5    Vitamin D, Cholecalciferol, 25 MCG (1000 UT) capsule Take 2 capsules by mouth Daily. 60 capsule      No current facility-administered medications on file prior to visit.        PFSH:     The following portions of the patient's history were reviewed and updated as appropriate: Allergies / Current Medications / Past Medical History / Surgical History / Social History / Family History    Problem List:  Patient Active Problem List   Diagnosis    Menopause    Hypertensive disorder    HTN (hypertension)    Multiple thyroid nodules    Onychomycosis of toenail    Hyperlipidemia    Gastroesophageal reflux disease with esophagitis without hemorrhage    Osteopenia    Vitamin D deficiency    Prediabetes       Past Medical History:  Past Medical History:   Diagnosis Date    Abnormal Pap smear of cervix 06/05/2020    ASCUS    Allergic Cephlexion    Disease of thyroid gland     Esophageal dysphagia     Gastroesophageal reflux disease with esophagitis without hemorrhage 01/12/2023    Goiter     Hyperlipidemia     Teakes Medication    Hypertension     no meds currently    Osteopenia 07/22/2020       Past Surgical History:  Past Surgical History:   Procedure Laterality Date    COLONOSCOPY      2015    ENDOSCOPY N/A 4/13/2022    Procedure: ESOPHAGOGASTRODUODENOSCOPY WITH BIOPSIES AND HOT SNARE POLYPECTOMY AND BRUSHING AND 12MM- 18MM  "BALLOON DILATION;  Surgeon: Nayla Pickens MD;  Location: [facility];  Service: Gastroenterology;  Laterality: N/A;  PRE- DYSPHAGIA  POST- RULE OUT CANDIDA, GASTRIC POLYP, GASTRITIS, ESOPHAEAL STRICTURE    FINGER SURGERY      THYROID SURGERY         Social History:  Social History     Socioeconomic History    Marital status: Single   Tobacco Use    Smoking status: Never    Smokeless tobacco: Never   Vaping Use    Vaping Use: Never used   Substance and Sexual Activity    Alcohol use: No    Drug use: No    Sexual activity: Not Currently     Partners: Male     Birth control/protection: Post-menopausal       Family History:  Family History   Problem Relation Age of Onset    Hypertension Mother             Diabetes Mother             Multiple myeloma Mother     Vision loss Mother             Cancer Mother             Diabetes Brother     Hypertension Brother     Liver cancer Brother     Hypertension Brother             Cancer Brother             Hypertension Brother     Hypertension Sister     Heart disease Sister     Hypertension Sister     Colon cancer Maternal Grandmother     Cancer Maternal Grandmother             Hypertension Sister     Malig Hyperthermia Neg Hx     Breast cancer Neg Hx          PATIENT ASSESSMENT     Vitals:  /82 (Cuff Size: Adult)   Pulse 74   Temp 98.6 øF (37 øC) (Oral)   Ht 160 cm (63\")   Wt 72.8 kg (160 lb 6.4 oz)   SpO2 98%   BMI 28.41 kg/mý   BP Readings from Last 3 Encounters:   23 130/82   23 142/86   23 130/82     Wt Readings from Last 3 Encounters:   23 72.8 kg (160 lb 6.4 oz)   23 72.1 kg (159 lb)   23 72.1 kg (159 lb)      Body mass index is 28.41 kg/mý.    Pain Score    23 0802   PainSc: 0-No pain         Review of Systems:    Review of Systems  Constitutional neg except per HPI   Resp neg  CV neg     Physical Exam:    Physical Exam  Constitutional  No " distress  Cardiovascular Rate  normal . Rhythm: regular . Heart sounds:  normal  Pulmonary/Chest  Effort normal. Breath sounds:  normal  Psychiatric  Alert. Judgment and thought content normal. Mood normal      Reviewed Data:    Labs:   Lab Results   Component Value Date     07/12/2023    K 4.6 07/12/2023    CALCIUM 9.6 07/12/2023    AST 21 07/12/2023    ALT 16 07/12/2023    BUN 17 07/12/2023    CREATININE 0.92 07/12/2023    CREATININE 1.02 (H) 01/12/2023    CREATININE 0.98 07/11/2022    EGFRIFNONA 70 01/17/2022    EGFRIFAFRI 80 01/17/2022       Lab Results   Component Value Date    HGBA1C 5.70 (H) 07/12/2023    HGBA1C 5.80 (H) 01/12/2023    HGBA1C 5.8 (H) 07/11/2022       Lab Results   Component Value Date     (H) 07/12/2023     (H) 01/12/2023     (H) 07/11/2022    HDL 78 (H) 07/12/2023    TRIG 48 07/12/2023    CHOLHDLRATIO 2.85 07/12/2023       Lab Results   Component Value Date    TSH 1.420 07/12/2023    FREET4 1.02 07/12/2023          Lab Results   Component Value Date    WBC 4.25 07/12/2023    HGB 12.4 07/12/2023    HGB 12.3 07/11/2022    HGB 12.4 06/05/2020     07/12/2023                 No results found for: PSA    Imaging:          Medical Tests:          Screening for Glaucoma:  Previous screening for glaucoma?: Yes      Hearing Loss Screen:  Finger Rub Hearing Test (right ear): passed  Finger Rub Hearing Test (left ear): passed      Urinary Incontinence Screen:  Episodes of urinary incontinence? : No      Depression Screen:      8/16/2023     7:59 AM   PHQ-2/PHQ-9 Depression Screening   Little Interest or Pleasure in Doing Things 0-->not at all   Feeling Down, Depressed or Hopeless 0-->not at all   PHQ-9: Brief Depression Severity Measure Score 0        PHQ-2: 0 (Not depressed)    PHQ-9: 0 (Negative screening for depression)       FUNCTIONAL, FALL RISK, & COGNITIVE SCREENING (Components below):    DATA:        8/16/2023     7:57 AM   Functional & Cognitive Status   Do you  have difficulty preparing food and eating? No   Do you have difficulty bathing yourself, getting dressed or grooming yourself? No   Do you have difficulty using the toilet? No   Do you have difficulty moving around from place to place? No   Do you have trouble with steps or getting out of a bed or a chair? No   Current Diet Well Balanced Diet   Eye Exam Up to date   Exercise (times per week) 7 times per week   Current Exercises Include Walking   Do you need help using the phone?  No   Are you deaf or do you have serious difficulty hearing?  No   Do you need help to go to places out of walking distance? No   Do you need help shopping? No   Do you need help preparing meals?  No   Do you need help with housework?  No   Do you need help with laundry? No   Do you need help taking your medications? No   Do you need help managing money? No   Do you ever drive or ride in a car without wearing a seat belt? No         A) Assessment of Functional Ability:  (Assessment of ability to perform ADL's (showering/bathing, using toilet, dressing, feeding self, moving self around) and IADL's (use telephone, shop, prepare food, housekeep, do laundry, transport independently, take medications independently, and handle finances)    Degree of functional impairment: NONE (based on assessment noted above)      B) Assessment of Fall Risk:  Fall Risk Assessment was completed, and patient is at low risk for falls.       Need for further evaluation of gait, strength, and balance? : No    Timed Up and Go (TUG):   (>= 12 seconds indicates high risk for falling)    Observable abnormalities included: Normal gait pattern       C. Assessment of Cognitive Function:    Mini-Cog Test:     1) Registration (3 objects): Yes   2) Number of objects recalled: 3   3) Clock Draw: Passed? : Yes       Further evaluation required? : No        COUNSELING       A. Identification of Health Risk Factors:    Risk factors include: cardiovascular risk factors      B.  Age-Appropriate Screening Schedule:  (Refer to the list below for future screening recommendations based on patient's age, sex and/or medical conditions. Orders for these recommended tests are listed in the plan section. The patient has been provided with a written plan)    Health Maintenance Topics  Health Maintenance   Topic Date Due    Pneumococcal Vaccine 65+ (1 - PCV) 09/15/2023 (Originally 12/23/2018)    ZOSTER VACCINE (1 of 2) 07/12/2024 (Originally 12/23/2003)    TDAP/TD VACCINES (1 - Tdap) 08/16/2024 (Originally 12/23/1972)    INFLUENZA VACCINE  10/01/2023    LIPID PANEL  07/12/2024    ANNUAL WELLNESS VISIT  08/16/2024    MAMMOGRAM  07/17/2025    DXA SCAN  08/04/2025    COLORECTAL CANCER SCREENING  01/18/2026    HEPATITIS C SCREENING  Completed    COVID-19 Vaccine  Completed       Health Maintenance Topics Due or Over-Due  There are no preventive care reminders to display for this patient.        C. Advanced Care Planning:    Advance Care Planning   ACP discussion was held with the patient during this visit. Patient has an advance directive (not in EMR), copy requested.       D. Patient Self-Management and Personalized Health Advice:    She has been provided with personalized counseling/information (including brochures/handouts) about:     -- optimizing diet/nutrition plans, improving exercise / conditioning, and reducing risk for cardiovascular disease (heart, stroke, vascular)      She has been recommended for the following preventative services which has been performed today, will be ordered today or ordered/performed on upcoming follow-up visit:     -- NUTRITION counseling provided, EXERCISE counseling provided, OSTEOPOROSIS screening DISCUSSED, BREAST CANCER screening DISCUSSED, CERVICAL CANCER screening DISCUSSED , **COLORECTAL cancer screening (colonoscopy/Cologuard discussed or ordered)      E. Miscellaneous Items:    -Aspirin use counseling: Does not need ASA (and currently is not on  it)    -Discussed BMI with her. The BMI is in the acceptable range    -Reviewed use of high risk medication in the elderly: YES    -Reviewed for potential of harmful drug interactions in the elderly: YES        WRAP UP       Assessment & Plan:    1) MEDICARE ANNUAL WELLNESS VISIT    2) OTHER MEDICAL CONDITIONS ADDRESSED TODAY:            Problem List Items Addressed This Visit    None  Visit Diagnoses       Medicare annual wellness visit, subsequent    -  Primary    Osteoporosis, unspecified osteoporosis type, unspecified pathological fracture presence        Relevant Medications    alendronate (Fosamax) 70 MG tablet                    No orders of the defined types were placed in this encounter.      Discussion / Summary:  Patient declines vaccination today.  Up-to-date on colonoscopy, mammogram, DEXA scan and Pap smear.  Osteoporosis seen on DEXA scan, patient not willing for Prolia but is willing to trial Fosamax to see if she has any GI side effects.  Discussed that she will need to drink a full glass of water and sit up for an hour or 2 after taking medication to help decrease the side effects.  She is acceptable to this.  We will follow-up in 5 months for chronic medical as we did increase her atorvastatin 1 year for annual wellness visit    Medications as of TODAY:              Current Outpatient Medications   Medication Sig Dispense Refill    ascorbic acid (VITAMIN C) 1000 MG tablet Take 1 tablet by mouth Daily.      atorvastatin (LIPITOR) 20 MG tablet Take 1 tablet by mouth Daily. 90 tablet 3    [facility] POTASSIUM GLUCONATE PO Take  by mouth.      Misc Natural Products (EQ GLUCOSAMINE-CHONDROITIN-MSM PO) Take  by mouth.      Multiple Vitamin (MULTI-DAY VITAMINS PO) Take  by mouth.      omeprazole (priLOSEC) 20 MG capsule Take 1 capsule by mouth 2 (Two) Times a Day Before Meals. 30 capsule 5    Vitamin D, Cholecalciferol, 25 MCG (1000 UT) capsule Take 2 capsules by mouth Daily. 60 capsule     alendronate  (Fosamax) 70 MG tablet Take 1 tablet by mouth Every 7 (Seven) Days. 12 tablet 3     No current facility-administered medications for this visit.         FOLLOW-UP:            Return in about 5 months (around 1/16/2024) for Next scheduled follow up; wellness visit 1 year .                 Future Appointments   Date Time Provider Department Center   1/16/2024  7:45 AM Michael Jones APRN [facility]   7/19/2024  9:30 AM MAMMOGRAM XENIA MARTIN [facility]   7/19/2024  9:45 AM Ray Page MD [facility]   7/29/2024  8:00 AM Harry Cross MD [facility]           After Visit Summary (AVS) including the Personalized Prevention  Plan Services (PPPS) was either printed and given to the patient at check-out today and/or sent to [facility] for review.       *Dragon dictation used for documentation.
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
## 2024-01-16 ENCOUNTER — OFFICE VISIT (OUTPATIENT)
Dept: INTERNAL MEDICINE | Age: 69
End: 2024-01-16
Payer: MEDICARE

## 2024-01-16 VITALS
OXYGEN SATURATION: 98 % | HEIGHT: 63 IN | BODY MASS INDEX: 29.06 KG/M2 | WEIGHT: 164 LBS | HEART RATE: 66 BPM | TEMPERATURE: 97.8 F | SYSTOLIC BLOOD PRESSURE: 125 MMHG | DIASTOLIC BLOOD PRESSURE: 82 MMHG

## 2024-01-16 DIAGNOSIS — E55.9 VITAMIN D DEFICIENCY: ICD-10-CM

## 2024-01-16 DIAGNOSIS — E78.5 HYPERLIPIDEMIA, UNSPECIFIED HYPERLIPIDEMIA TYPE: Primary | ICD-10-CM

## 2024-01-16 DIAGNOSIS — R73.03 PREDIABETES: ICD-10-CM

## 2024-01-16 DIAGNOSIS — Z23 NEED FOR COVID-19 VACCINE: ICD-10-CM

## 2024-01-16 DIAGNOSIS — M81.0 OSTEOPOROSIS, UNSPECIFIED OSTEOPOROSIS TYPE, UNSPECIFIED PATHOLOGICAL FRACTURE PRESENCE: ICD-10-CM

## 2024-01-16 DIAGNOSIS — E04.2 MULTIPLE THYROID NODULES: ICD-10-CM

## 2024-01-16 LAB
25(OH)D3+25(OH)D2 SERPL-MCNC: 30.6 NG/ML (ref 30–100)
ALBUMIN SERPL-MCNC: 4.4 G/DL (ref 3.5–5.2)
ALBUMIN/GLOB SERPL: 1.8 G/DL
ALP SERPL-CCNC: 66 U/L (ref 39–117)
ALT SERPL-CCNC: 21 U/L (ref 1–33)
AST SERPL-CCNC: 32 U/L (ref 1–32)
BILIRUB SERPL-MCNC: 0.4 MG/DL (ref 0–1.2)
BUN SERPL-MCNC: 16 MG/DL (ref 8–23)
BUN/CREAT SERPL: 18.8 (ref 7–25)
CALCIUM SERPL-MCNC: 9.5 MG/DL (ref 8.6–10.5)
CHLORIDE SERPL-SCNC: 105 MMOL/L (ref 98–107)
CHOLEST SERPL-MCNC: 173 MG/DL (ref 0–200)
CHOLEST/HDLC SERPL: 2.34 {RATIO}
CO2 SERPL-SCNC: 27.7 MMOL/L (ref 22–29)
CREAT SERPL-MCNC: 0.85 MG/DL (ref 0.57–1)
EGFRCR SERPLBLD CKD-EPI 2021: 73.8 ML/MIN/1.73
GLOBULIN SER CALC-MCNC: 2.4 GM/DL
GLUCOSE SERPL-MCNC: 96 MG/DL (ref 65–99)
HBA1C MFR BLD: 5.9 % (ref 4.8–5.6)
HDLC SERPL-MCNC: 74 MG/DL (ref 40–60)
LDLC SERPL CALC-MCNC: 90 MG/DL (ref 0–100)
POTASSIUM SERPL-SCNC: 4.1 MMOL/L (ref 3.5–5.2)
PROT SERPL-MCNC: 6.8 G/DL (ref 6–8.5)
SODIUM SERPL-SCNC: 143 MMOL/L (ref 136–145)
T4 FREE SERPL-MCNC: 1.1 NG/DL (ref 0.93–1.7)
TRIGL SERPL-MCNC: 43 MG/DL (ref 0–150)
TSH SERPL DL<=0.005 MIU/L-ACNC: 1.81 UIU/ML (ref 0.27–4.2)
VLDLC SERPL CALC-MCNC: 9 MG/DL (ref 5–40)

## 2024-01-16 PROCEDURE — 99214 OFFICE O/P EST MOD 30 MIN: CPT | Performed by: NURSE PRACTITIONER

## 2024-01-16 PROCEDURE — 3074F SYST BP LT 130 MM HG: CPT | Performed by: NURSE PRACTITIONER

## 2024-01-16 PROCEDURE — 3079F DIAST BP 80-89 MM HG: CPT | Performed by: NURSE PRACTITIONER

## 2024-01-16 RX ORDER — ALENDRONATE SODIUM 70 MG/1
70 TABLET ORAL
Start: 2024-01-16

## 2024-01-16 NOTE — PROGRESS NOTES
"    I N T E R N A L  M E D I C I N E  LILIANA MCGEE, APRN      ENCOUNTER DATE:  01/16/2024    Julissa CARLISLE Black / 68 y.o. / female      CHIEF COMPLAINT / REASON FOR OFFICE VISIT     Hyperlipidemia, Osteoporosis, and Vitamin D Deficiency      ASSESSMENT & PLAN     Problem List Items Addressed This Visit       Multiple thyroid nodules    Relevant Orders    TSH+Free T4    Hyperlipidemia - Primary    Relevant Medications    atorvastatin (LIPITOR) 20 MG tablet    Other Relevant Orders    Comprehensive Metabolic Panel    Lipid Panel With / Chol / HDL Ratio    Vitamin D deficiency    Relevant Medications    Vitamin D, Cholecalciferol, 25 MCG (1000 UT) capsule    Other Relevant Orders    Vitamin D,25-Hydroxy    Prediabetes    Relevant Orders    Hemoglobin A1c     Other Visit Diagnoses       Need for COVID-19 vaccine        Relevant Medications    COVID-19 mRNA Vac-Jewell,Pfizer, 30 MCG/0.3ML suspension    Osteoporosis, unspecified osteoporosis type, unspecified pathological fracture presence        Relevant Medications    alendronate (Fosamax) 70 MG tablet          Orders Placed This Encounter   Procedures    Comprehensive Metabolic Panel    Lipid Panel With / Chol / HDL Ratio    Hemoglobin A1c    Vitamin D,25-Hydroxy    TSH+Free T4     New Medications Ordered This Visit   Medications    COVID-19 mRNA Vac-Jewell,Pfizer, 30 MCG/0.3ML suspension     Sig: Inject 0.3 mL into the appropriate muscle as directed by prescriber 1 (One) Time for 1 dose.     Dispense:  0.3 mL     Refill:  0    alendronate (Fosamax) 70 MG tablet     Sig: Take 1 tablet by mouth Every 7 (Seven) Days.       SUMMARY/DISCUSSION  Continue all chronic medications 6 months.  Tolerating Fosamax well    Next Appointment with me: Visit date not found    Return in about 6 months (around 7/16/2024) for Medicare Wellness.      VITAL SIGNS     Visit Vitals  /82   Pulse 66   Temp 97.8 °F (36.6 °C) (Temporal)   Ht 160 cm (63\")   Wt 74.4 kg (164 lb)   SpO2 98%   BMI " 29.05 kg/m²       Wt Readings from Last 3 Encounters:   01/16/24 74.4 kg (164 lb)   08/16/23 72.8 kg (160 lb 6.4 oz)   07/25/23 72.1 kg (159 lb)     Body mass index is 29.05 kg/m².    MEDICATIONS AT THE TIME OF OFFICE VISIT     Current Outpatient Medications on File Prior to Visit   Medication Sig    ascorbic acid (VITAMIN C) 1000 MG tablet Take 1 tablet by mouth Daily.    atorvastatin (LIPITOR) 20 MG tablet Take 1 tablet by mouth Daily.    CVS POTASSIUM GLUCONATE PO Take  by mouth.    Misc Natural Products (EQ GLUCOSAMINE-CHONDROITIN-MSM PO) Take  by mouth.    Multiple Vitamin (MULTI-DAY VITAMINS PO) Take  by mouth.    omeprazole (priLOSEC) 20 MG capsule Take 1 capsule by mouth 2 (Two) Times a Day Before Meals.    Vitamin D, Cholecalciferol, 25 MCG (1000 UT) capsule Take 2 capsules by mouth Daily.    [DISCONTINUED] alendronate (Fosamax) 70 MG tablet Take 1 tablet by mouth Every 7 (Seven) Days.     No current facility-administered medications on file prior to visit.          HISTORY OF PRESENT ILLNESS     DEXA scan showed osteoporosis August 4, 2023.  Recommended Prolia at that time due to previous esophageal stenosis (on omeprazole 20mg daily) that has been dilated but patient would really like to proceed with Fosamax and determine if she has any side effects that she would not like to proceed with the injection. She has not been on fosamax with no side effects since August 2023.     Recent ultrasound of the thyroid showing post right thyroidectomy removal with no suspicious lesion.  Left-sided nodules require no further follow-up.    Hyperlipidemia, at last office visit atorvastatin was increased to 20mg daily, previous .     Prediabetes with hemoglobin A1c at 5.7. No medication treatment at this time.     Vitamin D deficiency on 2000 units daily, last level 38.    REVIEW OF SYSTEMS     Constitutional neg except per HPI   Resp neg  CV neg     PHYSICAL EXAMINATION     Physical Exam  Constitutional  No  distress  Cardiovascular Rate  normal . Rhythm: regular . Heart sounds:  normal  Pulmonary/Chest  Effort normal. Breath sounds:  normal  Psychiatric  Alert. Judgment and thought content normal. Mood normal      REVIEWED DATA     Labs:   Lab Results   Component Value Date    GLUCOSE 94 07/12/2023    BUN 17 07/12/2023    CREATININE 0.92 07/12/2023    EGFRRESULT 67.5 07/12/2023    BCR 18.5 07/12/2023    K 4.6 07/12/2023    CO2 28.1 07/12/2023    CALCIUM 9.6 07/12/2023    PROTENTOTREF 6.5 07/12/2023    ALBUMIN 4.4 07/12/2023    BILITOT <0.2 07/12/2023    AST 21 07/12/2023    ALT 16 07/12/2023     Lab Results   Component Value Date    WBC 4.25 07/12/2023    HGB 12.4 07/12/2023    HCT 37.5 07/12/2023    MCV 89.3 07/12/2023     07/12/2023     Lab Results   Component Value Date    CHOL 183 11/15/2018    CHLPL 222 (H) 07/12/2023    TRIG 48 07/12/2023    HDL 78 (H) 07/12/2023     (H) 07/12/2023     Lab Results   Component Value Date    HGBA1C 5.70 (H) 07/12/2023     Lab Results   Component Value Date    TSH 1.420 07/12/2023    D7XTQSP 5.2 03/27/2017    THYROIDAB 10 12/03/2018 07/12/2024 Vitamin D 38.2    Imaging:           Medical Tests:           Summary of old records / correspondence / consultant report:           Request outside records:           *Dragon dictation used for documentation.

## 2024-07-07 DIAGNOSIS — E78.5 HYPERLIPIDEMIA, UNSPECIFIED HYPERLIPIDEMIA TYPE: ICD-10-CM

## 2024-07-07 RX ORDER — ATORVASTATIN CALCIUM 20 MG/1
20 TABLET, FILM COATED ORAL DAILY
Qty: 90 TABLET | Refills: 3 | Status: SHIPPED | OUTPATIENT
Start: 2024-07-07

## 2024-07-26 DIAGNOSIS — M81.0 OSTEOPOROSIS, UNSPECIFIED OSTEOPOROSIS TYPE, UNSPECIFIED PATHOLOGICAL FRACTURE PRESENCE: ICD-10-CM

## 2024-07-26 RX ORDER — ALENDRONATE SODIUM 70 MG/1
70 TABLET ORAL WEEKLY
Qty: 12 TABLET | Refills: 1 | Status: SHIPPED | OUTPATIENT
Start: 2024-07-26

## 2024-07-31 DIAGNOSIS — M81.0 OSTEOPOROSIS, UNSPECIFIED OSTEOPOROSIS TYPE, UNSPECIFIED PATHOLOGICAL FRACTURE PRESENCE: ICD-10-CM

## 2024-08-01 ENCOUNTER — OFFICE VISIT (OUTPATIENT)
Dept: OBSTETRICS AND GYNECOLOGY | Facility: CLINIC | Age: 69
End: 2024-08-01
Payer: MEDICARE

## 2024-08-01 ENCOUNTER — PROCEDURE VISIT (OUTPATIENT)
Dept: OBSTETRICS AND GYNECOLOGY | Facility: CLINIC | Age: 69
End: 2024-08-01
Payer: MEDICARE

## 2024-08-01 VITALS
BODY MASS INDEX: 29.23 KG/M2 | DIASTOLIC BLOOD PRESSURE: 85 MMHG | SYSTOLIC BLOOD PRESSURE: 144 MMHG | HEIGHT: 63 IN | WEIGHT: 165 LBS

## 2024-08-01 DIAGNOSIS — M81.0 MENOPAUSAL OSTEOPOROSIS: ICD-10-CM

## 2024-08-01 DIAGNOSIS — Z01.419 VISIT FOR GYNECOLOGIC EXAMINATION: Primary | ICD-10-CM

## 2024-08-01 DIAGNOSIS — Z12.11 COLON CANCER SCREENING: ICD-10-CM

## 2024-08-01 DIAGNOSIS — Z12.31 VISIT FOR SCREENING MAMMOGRAM: Primary | ICD-10-CM

## 2024-08-01 RX ORDER — ALENDRONATE SODIUM 70 MG/1
70 TABLET ORAL WEEKLY
Qty: 4 TABLET | Refills: 1 | Status: SHIPPED | OUTPATIENT
Start: 2024-08-01

## 2024-08-01 NOTE — PROGRESS NOTES
[facility]  [address] [facility], [address]  [place], [place] [address]  Phone: [phone] / Fax:  [phone]      08/01/2024    [address] [place], [place] [id number]    Michael Jones, DNP, APRN    Chief Complaint   Patient presents with    Gynecologic Exam     Annual Exam, last pap 7-17-23 NL,  7-15-22 NL, 7-13-21 NL, 6-5-20 ASCUS HPV (-). Mammogram today, previous 7-17-23-NL. Colonoscopy 1-- NL, repeat in 10 years per patient. DEXA 8-4-23 Osteoporosis.       Julissa Armenta is here for annual gynecologic exam.  HPI - Patient with normal pap over past 3 years.  Her pap in 2020 was ASCUS HPV negative.  Patient had normal mammogram one year ago and underwent screening again today.  She had a normal Colonoscopy in 2016 and a 10 year screening interval was recommended.  She had a DEXA in 2023 with diagnosis of osteoporosis; she is on Fosamax.    Past Medical History:   Diagnosis Date    Abnormal Pap smear of cervix 06/05/2020    ASCUS    Allergic Cephlexion    Disease of thyroid gland     Esophageal dysphagia     Gastroesophageal reflux disease with esophagitis without hemorrhage 01/12/2023    Goiter     Hyperlipidemia     Teakes Medication    Hypertension     no meds currently    Osteopenia 07/22/2020    Osteoporosis 08/04/2023       Past Surgical History:   Procedure Laterality Date    COLONOSCOPY      2015    ENDOSCOPY N/A 04/13/2022    Procedure: ESOPHAGOGASTRODUODENOSCOPY WITH BIOPSIES AND HOT SNARE POLYPECTOMY AND BRUSHING AND 12MM- 18MM BALLOON DILATION;  Surgeon: Nayla Pickens MD;  Location: [facility] ENDOSCOPY;  Service: Gastroenterology;  Laterality: N/A;  PRE- DYSPHAGIA  POST- RULE OUT CANDIDA, GASTRIC POLYP, GASTRITIS, ESOPHAEAL STRICTURE    FINGER SURGERY      FRACTURE SURGERY      THYROID SURGERY         Allergies   Allergen Reactions    Cephalexin Rash       Social History     Socioeconomic History    Marital status: Single   Tobacco Use    Smoking status: Never    Smokeless  "tobacco: Never   Vaping Use    Vaping status: Never Used   Substance and Sexual Activity    Alcohol use: No    Drug use: No    Sexual activity: Not Currently     Partners: Male     Birth control/protection: Post-menopausal       Family History   Problem Relation Age of Onset    Hypertension Mother             Diabetes Mother             Multiple myeloma Mother     Vision loss Mother             Cancer Mother             Diabetes Brother     Hypertension Brother     Liver cancer Brother     Hypertension Brother             Cancer Brother             Hypertension Brother     Hypertension Sister     Heart disease Sister     Hypertension Sister     Colon cancer Maternal Grandmother     Cancer Maternal Grandmother             Hypertension Sister     Malig Hyperthermia Neg Hx     Breast cancer Neg Hx        No LMP recorded. Patient is postmenopausal.    OB History          2    Para   1    Term   1            AB   1    Living             SAB   1    IAB        Ectopic        Molar        Multiple        Live Births   1                Vitals:    24 1043   BP: 144/85   Weight: 74.8 kg (165 lb)   Height: 160 cm (63\")       Physical Exam  Constitutional:       Appearance: Normal appearance. She is well-developed.   Genitourinary:      Bladder and urethral meatus normal.      Right Labia: No tenderness or lesions.     Left Labia: No tenderness or lesions.     No vaginal discharge or tenderness.        Right Adnexa: not tender and not full.     Left Adnexa: not tender and not full.     No cervical motion tenderness or lesion.      Uterus is not enlarged or tender.      No urethral tenderness or hypermobility present.   Rectum:      No rectal mass or tenderness.   Breasts:     Right: No mass or nipple discharge.      Left: No mass or nipple discharge.   HENT:      Right Ear: External ear normal.      Left Ear: External ear normal.      Nose: Nose normal. "   Eyes:      Conjunctiva/sclera: Conjunctivae normal.   Neck:      Thyroid: No thyromegaly.   Cardiovascular:      Rate and Rhythm: Normal rate and regular rhythm.      Heart sounds: Normal heart sounds.   Pulmonary:      Effort: Pulmonary effort is normal.      Breath sounds: No stridor. No wheezing.   Abdominal:      Palpations: Abdomen is soft.      Tenderness: There is no abdominal tenderness. There is no guarding or rebound.   Musculoskeletal:         General: Normal range of motion.      Cervical back: Normal range of motion and neck supple.   Neurological:      Mental Status: She is alert.      Coordination: Coordination normal.   Skin:     General: Skin is warm and dry.   Psychiatric:         Mood and Affect: Mood normal.         Behavior: Behavior normal.         Thought Content: Thought content normal.         Judgment: Judgment normal.   Vitals reviewed. Exam conducted with a chaperone present.         Diagnoses and all orders for this visit:    1. Visit for gynecologic examination (Primary)        -      Discussed importance of regular screening and breast awareness.         2. Colon cancer screening  -     POC Occult Blood Stool    3. Menopausal osteoporosis        -     Continue Fosamax.  Encouraged compliance with Vitamin D and Calcium.      Ray Page MD

## 2024-09-09 ENCOUNTER — OFFICE VISIT (OUTPATIENT)
Dept: INTERNAL MEDICINE | Age: 69
End: 2024-09-09
Payer: MEDICARE

## 2024-09-09 VITALS
WEIGHT: 164.6 LBS | SYSTOLIC BLOOD PRESSURE: 132 MMHG | HEART RATE: 57 BPM | DIASTOLIC BLOOD PRESSURE: 84 MMHG | BODY MASS INDEX: 29.16 KG/M2 | TEMPERATURE: 96.6 F | OXYGEN SATURATION: 96 % | HEIGHT: 63 IN

## 2024-09-09 DIAGNOSIS — E78.5 HYPERLIPIDEMIA, UNSPECIFIED HYPERLIPIDEMIA TYPE: ICD-10-CM

## 2024-09-09 DIAGNOSIS — Z00.00 MEDICARE ANNUAL WELLNESS VISIT, SUBSEQUENT: Primary | ICD-10-CM

## 2024-09-09 DIAGNOSIS — I10 PRIMARY HYPERTENSION: ICD-10-CM

## 2024-09-09 DIAGNOSIS — E55.9 VITAMIN D DEFICIENCY: ICD-10-CM

## 2024-09-09 DIAGNOSIS — Z12.4 SCREENING FOR CERVICAL CANCER: ICD-10-CM

## 2024-09-09 DIAGNOSIS — R73.03 PREDIABETES: ICD-10-CM

## 2024-09-09 DIAGNOSIS — M81.0 OSTEOPOROSIS, UNSPECIFIED OSTEOPOROSIS TYPE, UNSPECIFIED PATHOLOGICAL FRACTURE PRESENCE: ICD-10-CM

## 2024-09-09 DIAGNOSIS — E04.2 MULTIPLE THYROID NODULES: ICD-10-CM

## 2024-09-09 PROCEDURE — 1170F FXNL STATUS ASSESSED: CPT | Performed by: NURSE PRACTITIONER

## 2024-09-09 PROCEDURE — 1126F AMNT PAIN NOTED NONE PRSNT: CPT | Performed by: NURSE PRACTITIONER

## 2024-09-09 PROCEDURE — 3079F DIAST BP 80-89 MM HG: CPT | Performed by: NURSE PRACTITIONER

## 2024-09-09 PROCEDURE — G0439 PPPS, SUBSEQ VISIT: HCPCS | Performed by: NURSE PRACTITIONER

## 2024-09-09 PROCEDURE — 3075F SYST BP GE 130 - 139MM HG: CPT | Performed by: NURSE PRACTITIONER

## 2024-09-09 NOTE — PROGRESS NOTES
I N T E R N A L  M E D I C I N E    S K Y L E R   F R Y E  D N P ,  A P R N    ENCOUNTER DATE:  09/09/2024    Julissa CARLISLE Black / 68 y.o. / female    MEDICARE ANNUAL WELLNESS VISIT     Chief Complaint:    Chief Complaint   Patient presents with    Medicare Wellness-subsequent     8/16/2023    Osteoporosis    Hyperlipidemia     Patient's general assessment of her health since a year ago:     - Compared to one year ago, she feels her physical health is:   STABLE/SAME    - Compared to one year ago, she feels her mental health is:  STABLE/SAME    Recent Hospitalization (within past 365 days) (NO unless indicated)  No    Patient Care Team:    Patient Care Team:  Michael Jones, LOPEZ, APRN as PCP - General (Internal Medicine)  Harry Cross MD as Consulting Physician (Cardiology)  Ray Page MD as Consulting Physician (Obstetrics and Gynecology)    Allergies:  Cephalexin    Medications:  Current Outpatient Medications on File Prior to Visit   Medication Sig Dispense Refill    alendronate (FOSAMAX) 70 MG tablet TAKE 1 TABLET BY MOUTH ONCE WEEKLY ON AN EMPTY STOMACH BEFORE BREAKFAST. REMAIN UPRIGHT FOR 30 MINUTES AND TAKE WITH 8 OUNCES OF WATER 4 tablet 1    ascorbic acid (VITAMIN C) 1000 MG tablet Take 1 tablet by mouth Daily.      atorvastatin (LIPITOR) 20 MG tablet TAKE 1 TABLET BY MOUTH DAILY 90 tablet 3    [facility] POTASSIUM GLUCONATE PO Take  by mouth.      Misc Natural Products (EQ GLUCOSAMINE-CHONDROITIN-MSM PO) Take  by mouth.      Multiple Vitamin (MULTI-DAY VITAMINS PO) Take  by mouth.      Vitamin D, Cholecalciferol, 25 MCG (1000 UT) capsule Take 2 capsules by mouth Daily. 60 capsule     [DISCONTINUED] omeprazole (priLOSEC) 20 MG capsule Take 1 capsule by mouth 2 (Two) Times a Day Before Meals. (Patient not taking: Reported on 9/9/2024) 30 capsule 5     No current facility-administered medications on file prior to visit.          No opioid medication identified on active medication list. I have  reviewed chart for other potential  high risk medication/s and harmful drug interactions in the elderly.       Opioid Pain Assessment: No opioid listed on medication list       HPI for other active medical problems:     Hyperlipidemia, at last office visit atorvastatin was increased to 20mg daily, LDL improved from 136 to 90 with modified medication regimen.    Prediabetes with hemoglobin A1c at 5.9. No medication treatment at this time.     Vitamin D deficiency on 2000 units daily, last level 30.6.      Patient was seen by her OB/GYN, Dr. Page for routine well woman, colon cancer screening and menopausal osteoporosis.  Pap in 2020 was negative.  DEXA scan was completed August 4, 2023 osteopenia progressed osteoporosis and she was started on Fosamax weekly.  Tolerating well. Mammogram August 1, 2024, benign.     Colonoscopy 2016 with 10-year recall.    Ultrasound of the thyroid August 4, 2023 thyroid showing post right thyroidectomy removal with no suspicious lesion.  Left-sided nodules require no further follow-up.      HISTORY     PFSH:     The following portions of the patient's history were reviewed and updated as appropriate: Allergies / Current Medications / Past Medical History / Surgical History / Social History / Family History    Problem List:  Patient Active Problem List   Diagnosis    Menopause    Hypertensive disorder    HTN (hypertension)    Multiple thyroid nodules    Onychomycosis of toenail    Hyperlipidemia    Gastroesophageal reflux disease with esophagitis without hemorrhage    Osteopenia    Vitamin D deficiency    Prediabetes       Past Medical History:  Past Medical History:   Diagnosis Date    Abnormal Pap smear of cervix 06/05/2020    ASCUS    Allergic Cephlexion    Disease of thyroid gland     Esophageal dysphagia     Gastroesophageal reflux disease with esophagitis without hemorrhage 01/12/2023    Goiter     Hyperlipidemia     Teakes Medication    Hypertension     no meds currently     "Osteopenia 2020    Osteoporosis 2023       Past Surgical History:  Past Surgical History:   Procedure Laterality Date    COLONOSCOPY          ENDOSCOPY N/A 2022    Procedure: ESOPHAGOGASTRODUODENOSCOPY WITH BIOPSIES AND HOT SNARE POLYPECTOMY AND BRUSHING AND 12MM- 18MM BALLOON DILATION;  Surgeon: Nayla Pickens MD;  Location: [facility] ENDOSCOPY;  Service: Gastroenterology;  Laterality: N/A;  PRE- DYSPHAGIA  POST- RULE OUT CANDIDA, GASTRIC POLYP, GASTRITIS, ESOPHAEAL STRICTURE    FINGER SURGERY      FRACTURE SURGERY      THYROID SURGERY         Social History:  Social History     Socioeconomic History    Marital status: Single   Tobacco Use    Smoking status: Never    Smokeless tobacco: Never   Vaping Use    Vaping status: Never Used   Substance and Sexual Activity    Alcohol use: No    Drug use: No    Sexual activity: Not Currently     Partners: Male     Birth control/protection: Post-menopausal       Family History:  Family History   Problem Relation Age of Onset    Hypertension Mother             Diabetes Mother             Multiple myeloma Mother     Vision loss Mother             Cancer Mother             Diabetes Brother     Hypertension Brother     Liver cancer Brother     Hypertension Brother             Cancer Brother             Hypertension Brother     Hypertension Sister     Heart disease Sister     Hypertension Sister     Colon cancer Maternal Grandmother     Cancer Maternal Grandmother             Hypertension Sister     Malig Hyperthermia Neg Hx     Breast cancer Neg Hx          PATIENT ASSESSMENT     Vitals:  Vitals:    24 1245   BP: 132/84   Pulse: 57   Temp: 96.6 °F (35.9 °C)   SpO2: 96%   Weight: 74.7 kg (164 lb 9.6 oz)   Height: 160 cm (63\")       BP Readings from Last 3 Encounters:   24 132/84   24 144/85   24 125/82     Wt Readings from Last 3 Encounters:   24 74.7 kg (164 lb 9.6 oz) " "  08/01/24 74.8 kg (165 lb)   01/16/24 74.4 kg (164 lb)      Body mass index is 29.16 kg/m².    [unfilled]        Review of Systems:    Review of Systems  Constitutional neg except per HPI   Resp neg  CV neg   Physical Exam:    Physical Exam  Constitutional  No distress  Cardiovascular Rate  normal . Rhythm: regular . Heart sounds:  normal  Pulmonary/Chest  Effort normal. Breath sounds:  normal  Psychiatric  Alert. Judgment and thought content normal. Mood normal      Reviewed Data:    Labs:   Lab Results   Component Value Date     01/16/2024    K 4.1 01/16/2024    CALCIUM 9.5 01/16/2024    AST 32 01/16/2024    ALT 21 01/16/2024    BUN 16 01/16/2024    CREATININE 0.85 01/16/2024    CREATININE 0.92 07/12/2023    CREATININE 1.02 (H) 01/12/2023    EGFRIFNONA 70 01/17/2022    EGFRIFAFRI 80 01/17/2022       Lab Results   Component Value Date    HGBA1C 5.90 (H) 01/16/2024    HGBA1C 5.70 (H) 07/12/2023    HGBA1C 5.80 (H) 01/12/2023       Lab Results   Component Value Date    LDL 90 01/16/2024     (H) 07/12/2023     (H) 01/12/2023    HDL 74 (H) 01/16/2024    TRIG 43 01/16/2024    CHOLHDLRATIO 2.34 01/16/2024       Lab Results   Component Value Date    TSH 1.810 01/16/2024    FREET4 1.10 01/16/2024          Lab Results   Component Value Date    WBC 4.25 07/12/2023    HGB 12.4 07/12/2023    HGB 12.3 07/11/2022    HGB 12.4 06/05/2020     07/12/2023                 No results found for: \"PSA\"    Imaging:                Medical Tests:              Summary of old records / correspondence / consultant report:             Request outside records:           SCREENING ASSESSMENT      Screening for Glaucoma:  Previous screening for glaucoma?: Yes    Hearing Loss Screen:  Finger Rub Hearing Test (right ear): Passed  Finger Rub Hearing Test (left ear): Passed    Urinary Incontinence Screen:  Episodes of urinary incontinence? : No    Depression Screen:      9/9/2024    12:53 PM   PHQ-2/PHQ-9 Depression Screening "   Little Interest or Pleasure in Doing Things 0-->not at all   Feeling Down, Depressed or Hopeless 0-->not at all   PHQ-9: Brief Depression Severity Measure Score 0        PHQ-2: 0 (Not depressed)    PHQ-9: 0 (Negative screening for depression)         FUNCTIONAL, FALL RISK, & COGNITIVE SCREENING (components below):     A) Functional and cognitive status based on patient responses:        9/8/2024     8:19 PM   Functional & Cognitive Status   Do you have difficulty preparing food and eating? No   Do you have difficulty bathing yourself, getting dressed or grooming yourself? No   Do you have difficulty using the toilet? No   Do you have difficulty moving around from place to place? No   Do you have trouble with steps or getting out of a bed or a chair? No   Current Diet Well Balanced Diet   Dental Exam Up to date   Eye Exam Up to date   Exercise (times per week) 5 times per week   Current Exercises Include Aerobics;Dancing;House Cleaning;Walking;Yard Work   Do you need help using the phone?  No   Are you deaf or do you have serious difficulty hearing?  No   Do you need help to go to places out of walking distance? No   Do you need help shopping? No   Do you need help preparing meals?  No   Do you need help with housework?  No   Do you need help with laundry? No   Do you need help taking your medications? No   Do you need help managing money? No   Do you ever drive or ride in a car without wearing a seat belt? No   Have you felt unusual stress, anger or loneliness in the last month? No   Who do you live with? Alone   If you need help, do you have trouble finding someone available to you? No   Have you been bothered in the last four weeks by sexual problems? No   Do you have difficulty concentrating, remembering or making decisions? No       B) Assessment of Fall Risk:    Fall Risk Assessment was completed, and patient is at low risk for falls.    Need for further evaluation of gait, strength, and balance? :  NO    Timed Up and Go (TUG): 6 seconds   (>= 12 seconds indicates high risk for falling)    Observable abnormalities included:   Normal balance and gait pattern    C. Assessment of Cognitive Function:    Mini-Cog Test:     1) Registration (3 objects): YES   2) Clock Draw: Passed? : Yes   3) Number of objects recalled: 3 (MA)     Further evaluation required? : No    **OVERALL ASSESSMENT OF FUNCTIONAL ABILITY**  (Assessment of ability to perform ADL's (showering/bathing, using toilet, dressing, feeding self, moving self around) and IADL's (use telephone, shop, prepare food, housekeep, do laundry, transport independently, take medications independently, and handle finances)    DEGREE OF FUNCTIONAL IMPAIRMENT:   NONE (based on assessment noted above)    ABILITY TO LIVE INDEPENDENTLY:    Capable of living independently       COUNSELING       A. Identification of Health Risk Factors:    Risk factors include: cardiovascular risk factors      B. Age-Appropriate Screening Schedule:  (Refer to the list below for future screening recommendations based on patient's age, sex and/or medical conditions. Orders for these recommended tests are listed in the plan section. The patient has been provided with a written plan)    Health Maintenance Topics  Health Maintenance   Topic Date Due    TDAP/TD VACCINES (1 - Tdap) Never done    ZOSTER VACCINE (1 of 2) Never done    ANNUAL WELLNESS VISIT  08/16/2024    COVID-19 Vaccine (8 - 2023-24 season) 09/01/2024    INFLUENZA VACCINE  08/01/2024    Pneumococcal Vaccine 65+ (1 of 1 - PCV) 01/16/2025 (Originally 12/23/2018)    LIPID PANEL  01/16/2025    BMI FOLLOWUP  01/16/2025    DXA SCAN  08/04/2025    COLORECTAL CANCER SCREENING  01/18/2026    MAMMOGRAM  08/01/2026    HEPATITIS C SCREENING  Completed       Health Maintenance Topics Due or Over-Due  Health Maintenance Due   Topic Date Due    TDAP/TD VACCINES (1 - Tdap) Never done    ZOSTER VACCINE (1 of 2) Never done    ANNUAL WELLNESS VISIT   08/16/2024    COVID-19 Vaccine (8 - 2023-24 season) 09/01/2024    INFLUENZA VACCINE  08/01/2024         C. Advanced Care Planning:    Advance Care Planning   ACP discussion was held with the patient during this visit. Patient does not have an advance directive, declines further assistance.       D. Patient Self-Management and Personalized Health Advice:    She has been provided with PERSONALIZED COUNSELING/INFORMATION (AVS educational information) about:     -- optimizing diet/nutrition plans, improving exercise / conditioning, and reducing risk for cardiovascular disease (heart, stroke, vascular)      She has been recommended for the following PREVENTATIVE SERVICES which has been performed today, will be ordered today or ordered/performed on upcoming follow-up visit:     LIFESTYLE PREVENTATIVE MEASURES   NUTRITION counseling provided, EXERCISE counseling provided    CARDIOVASCULAR SCREENING   Followed by cardiology     CANCER SCREENING   COLORECTAL cancer: Colonoscopy/Cologuard discussed , BREAST CANCER screening discussed and mammogram every 1-2 years recommended (managed by me), CERVICAL CANCER screening  (pelvic/pap smear with gyne recommended)    MISC SCREENING  OSTEOPOROSIS screening DISCUSSED    VACCINATION/IMMUNIZATION   COVID-19 vaccination discussed/recommended      E. Miscellaneous Items: [id number]    Aspirin use counseling: Does not need ASA (and currently is not on it)    Discussed BMI with her. The BMI is above average; BMI management plan is completed (discussed plans for weight loss)    Reviewed use of high risk medication in the elderly: YES    Reviewed for potential of harmful drug interactions in the elderly: YES        WRAP UP       Assessment & Plan:    1) MEDICARE ANNUAL WELLNESS VISIT    2) OTHER MEDICAL CONDITIONS ADDRESSED TODAY:            Problem List Items Addressed This Visit       HTN (hypertension)    Relevant Orders    Comprehensive Metabolic Panel    CBC w AUTO Differential     Multiple thyroid nodules    Relevant Orders    TSH+Free T4    Hyperlipidemia    Relevant Medications    atorvastatin (LIPITOR) 20 MG tablet    Other Relevant Orders    Comprehensive Metabolic Panel    Lipid Panel With / Chol / HDL Ratio    Vitamin D deficiency    Relevant Medications    Vitamin D, Cholecalciferol, 25 MCG (1000 UT) capsule    Other Relevant Orders    Vitamin D,25-Hydroxy    Prediabetes    Relevant Orders    Hemoglobin A1c     Other Visit Diagnoses       Medicare annual wellness visit, subsequent    -  Primary    Osteoporosis, unspecified osteoporosis type, unspecified pathological fracture presence        Relevant Orders    Vitamin D,25-Hydroxy    Comprehensive Metabolic Panel    Screening for cervical cancer        Relevant Orders    Ambulatory Referral to Obstetrics / Gynecology                      Orders Placed This Encounter   Procedures    Vitamin D,25-Hydroxy    Hemoglobin A1c    Comprehensive Metabolic Panel    Lipid Panel With / Chol / HDL Ratio    TSH+Free T4    Ambulatory Referral to Obstetrics / Gynecology    CBC w AUTO Differential       Discussion / Summary:  Continue all current care, follow-up in 6 months for chronic medical, 1 year Medicare wellness  Will receive COVID-19 booster at local pharmacy    Medications as of TODAY:              Current Outpatient Medications   Medication Sig Dispense Refill    alendronate (FOSAMAX) 70 MG tablet TAKE 1 TABLET BY MOUTH ONCE WEEKLY ON AN EMPTY STOMACH BEFORE BREAKFAST. REMAIN UPRIGHT FOR 30 MINUTES AND TAKE WITH 8 OUNCES OF WATER 4 tablet 1    ascorbic acid (VITAMIN C) 1000 MG tablet Take 1 tablet by mouth Daily.      atorvastatin (LIPITOR) 20 MG tablet TAKE 1 TABLET BY MOUTH DAILY 90 tablet 3    [facility]S POTASSIUM GLUCONATE PO Take  by mouth.      Misc Natural Products (EQ GLUCOSAMINE-CHONDROITIN-MSM PO) Take  by mouth.      Multiple Vitamin (MULTI-DAY VITAMINS PO) Take  by mouth.      Vitamin D, Cholecalciferol, 25 MCG (1000 UT) capsule Take 2  capsules by mouth Daily. 60 capsule      No current facility-administered medications for this visit.         FOLLOW-UP:            Return in about 6 months (around 3/9/2025) for Next scheduled follow up; 1 year medicare wellness  .              Future Appointments   Date Time Provider Department Center   11/1/2024 11:40 AM Harry Cross MD [facility]   8/4/2025  9:00 AM MAMMOGRAM [facility]   8/4/2025  9:15 AM Ray Page MD [facility]           After Visit Summary (AVS) including the Personalized Prevention  Plan Services (PPPS) was either printed and given to the patient at check-out today and/or sent to [facility] for review.

## 2024-09-10 DIAGNOSIS — E78.5 HYPERLIPIDEMIA, UNSPECIFIED HYPERLIPIDEMIA TYPE: Primary | ICD-10-CM

## 2024-09-10 LAB
25(OH)D3+25(OH)D2 SERPL-MCNC: 29.4 NG/ML (ref 30–100)
ALBUMIN SERPL-MCNC: 4.5 G/DL (ref 3.5–5.2)
ALBUMIN/GLOB SERPL: 1.7 G/DL
ALP SERPL-CCNC: 72 U/L (ref 39–117)
ALT SERPL-CCNC: 16 U/L (ref 1–33)
AST SERPL-CCNC: 26 U/L (ref 1–32)
BASOPHILS # BLD AUTO: 0.03 10*3/MM3 (ref 0–0.2)
BASOPHILS NFR BLD AUTO: 0.6 % (ref 0–1.5)
BILIRUB SERPL-MCNC: 0.5 MG/DL (ref 0–1.2)
BUN SERPL-MCNC: 19 MG/DL (ref 8–23)
BUN/CREAT SERPL: 22.6 (ref 7–25)
CALCIUM SERPL-MCNC: 9.5 MG/DL (ref 8.6–10.5)
CHLORIDE SERPL-SCNC: 106 MMOL/L (ref 98–107)
CHOLEST SERPL-MCNC: 192 MG/DL (ref 0–200)
CHOLEST/HDLC SERPL: 2.53 {RATIO}
CO2 SERPL-SCNC: 27.6 MMOL/L (ref 22–29)
CREAT SERPL-MCNC: 0.84 MG/DL (ref 0.57–1)
EGFRCR SERPLBLD CKD-EPI 2021: 74.9 ML/MIN/1.73
EOSINOPHIL # BLD AUTO: 0.06 10*3/MM3 (ref 0–0.4)
EOSINOPHIL NFR BLD AUTO: 1.3 % (ref 0.3–6.2)
ERYTHROCYTE [DISTWIDTH] IN BLOOD BY AUTOMATED COUNT: 13 % (ref 12.3–15.4)
GLOBULIN SER CALC-MCNC: 2.6 GM/DL
GLUCOSE SERPL-MCNC: 93 MG/DL (ref 65–99)
HBA1C MFR BLD: 5.7 % (ref 4.8–5.6)
HCT VFR BLD AUTO: 41.4 % (ref 34–46.6)
HDLC SERPL-MCNC: 76 MG/DL (ref 40–60)
HGB BLD-MCNC: 13.5 G/DL (ref 12–15.9)
IMM GRANULOCYTES # BLD AUTO: 0.01 10*3/MM3 (ref 0–0.05)
IMM GRANULOCYTES NFR BLD AUTO: 0.2 % (ref 0–0.5)
LDLC SERPL CALC-MCNC: 107 MG/DL (ref 0–100)
LYMPHOCYTES # BLD AUTO: 1.63 10*3/MM3 (ref 0.7–3.1)
LYMPHOCYTES NFR BLD AUTO: 34.8 % (ref 19.6–45.3)
MCH RBC QN AUTO: 30.1 PG (ref 26.6–33)
MCHC RBC AUTO-ENTMCNC: 32.6 G/DL (ref 31.5–35.7)
MCV RBC AUTO: 92.4 FL (ref 79–97)
MONOCYTES # BLD AUTO: 0.41 10*3/MM3 (ref 0.1–0.9)
MONOCYTES NFR BLD AUTO: 8.8 % (ref 5–12)
NEUTROPHILS # BLD AUTO: 2.54 10*3/MM3 (ref 1.7–7)
NEUTROPHILS NFR BLD AUTO: 54.3 % (ref 42.7–76)
NRBC BLD AUTO-RTO: 0 /100 WBC (ref 0–0.2)
PLATELET # BLD AUTO: 230 10*3/MM3 (ref 140–450)
POTASSIUM SERPL-SCNC: 4.3 MMOL/L (ref 3.5–5.2)
PROT SERPL-MCNC: 7.1 G/DL (ref 6–8.5)
RBC # BLD AUTO: 4.48 10*6/MM3 (ref 3.77–5.28)
SODIUM SERPL-SCNC: 143 MMOL/L (ref 136–145)
T4 FREE SERPL-MCNC: 0.96 NG/DL (ref 0.92–1.68)
TRIGL SERPL-MCNC: 48 MG/DL (ref 0–150)
TSH SERPL DL<=0.005 MIU/L-ACNC: 1.29 UIU/ML (ref 0.27–4.2)
VLDLC SERPL CALC-MCNC: 9 MG/DL (ref 5–40)
WBC # BLD AUTO: 4.68 10*3/MM3 (ref 3.4–10.8)

## 2024-09-10 RX ORDER — ATORVASTATIN CALCIUM 40 MG/1
40 TABLET, FILM COATED ORAL DAILY
Qty: 90 TABLET | Refills: 1 | Status: SHIPPED | OUTPATIENT
Start: 2024-09-10

## 2024-11-01 ENCOUNTER — OFFICE VISIT (OUTPATIENT)
Dept: CARDIOLOGY | Facility: CLINIC | Age: 69
End: 2024-11-01
Payer: MEDICARE

## 2024-11-01 VITALS
WEIGHT: 165 LBS | SYSTOLIC BLOOD PRESSURE: 134 MMHG | DIASTOLIC BLOOD PRESSURE: 82 MMHG | HEART RATE: 60 BPM | BODY MASS INDEX: 29.23 KG/M2 | HEIGHT: 63 IN

## 2024-11-01 DIAGNOSIS — I10 PRIMARY HYPERTENSION: Primary | ICD-10-CM

## 2024-11-01 PROCEDURE — 3079F DIAST BP 80-89 MM HG: CPT | Performed by: INTERNAL MEDICINE

## 2024-11-01 PROCEDURE — 99213 OFFICE O/P EST LOW 20 MIN: CPT | Performed by: INTERNAL MEDICINE

## 2024-11-01 PROCEDURE — 3075F SYST BP GE 130 - 139MM HG: CPT | Performed by: INTERNAL MEDICINE

## 2024-11-01 PROCEDURE — 93000 ELECTROCARDIOGRAM COMPLETE: CPT | Performed by: INTERNAL MEDICINE

## 2024-11-01 NOTE — PROGRESS NOTES
"      CARDIOLOGY    Harry Cross MD    ENCOUNTER DATE:  11/01/2024    Julissa Armenta / 68 y.o. / female        CHIEF COMPLAINT / REASON FOR OFFICE VISIT     Primary hypertension (07/25/2023 Follow up)      HISTORY OF PRESENT ILLNESS       HPI  Julissa Armenta is a 68 y.o. female who presents today for reevaluation.  Patient is doing great no complaints.  Her blood pressure remains excellent she has had no cardiac symptoms.      The following portions of the patient's history were reviewed and updated as appropriate: allergies, current medications, past family history, past medical history, past social history, past surgical history and problem list.      VITAL SIGNS     Visit Vitals  /82 (BP Location: Left arm)   Pulse 60   Ht 160 cm (63\")   Wt 74.8 kg (165 lb)   BMI 29.23 kg/m²         Wt Readings from Last 3 Encounters:   11/01/24 74.8 kg (165 lb)   09/09/24 74.7 kg (164 lb 9.6 oz)   08/01/24 74.8 kg (165 lb)     Body mass index is 29.23 kg/m².      REVIEW OF SYSTEMS   ROS        PHYSICAL EXAMINATION     Vitals reviewed.   Constitutional:       Appearance: Healthy appearance.   Pulmonary:      Effort: Pulmonary effort is normal.   Cardiovascular:      Normal rate. Regular rhythm. Normal S1. Normal S2.       Murmurs: There is no murmur.      No gallop.  No click. No rub.   Pulses:     Intact distal pulses.   Edema:     Peripheral edema absent.   Neurological:      Mental Status: Alert.           REVIEWED DATA       ECG 12 Lead    Date/Time: 11/1/2024 2:43 PM  Performed by: Harry Cross MD    Authorized by: Harry Cross MD  Comparison: compared with previous ECG from 7/25/2023  Similar to previous ECG  Rhythm: sinus rhythm  Conduction: left anterior fascicular block  Other findings: non-specific ST-T wave changes    Clinical impression: abnormal EKG          Cardiac Procedures:      Lipid Panel          1/16/2024    08:26 9/9/2024    13:41   Lipid Panel   Total Cholesterol 173  192  "   Triglycerides 43  48    HDL Cholesterol 74  76    VLDL Cholesterol 9  9    LDL Cholesterol  90  107          ASSESSMENT & PLAN      Diagnosis Plan   1. Primary hypertension              SUMMARY/DISCUSSION  Hypertension.  Blood pressure is good.  Continue the same.  Patient remains stable overall she is doing well.  Will see her back in 1 to 2 years sooner if any issues should arise.        MEDICATIONS         Discharge Medications            Accurate as of November 1, 2024  2:42 PM. If you have any questions, ask your nurse or doctor.                Continue These Medications        Instructions Start Date   alendronate 70 MG tablet  Commonly known as: FOSAMAX   70 mg, Oral, Weekly, Take on an empty stomach before breakfast with 8 ounces of water. Remain upright for 30 minutes after taking.      ascorbic acid 1000 MG tablet  Commonly known as: VITAMIN C   1,000 mg, Daily      atorvastatin 40 MG tablet  Commonly known as: Lipitor   40 mg, Oral, Daily      [employer] POTASSIUM GLUCONATE PO   Take  by mouth.      EQ GLUCOSAMINE-CHONDROITIN-MSM PO   Take  by mouth.      multivitamin tablet tablet  Commonly known as: THERAGRAN   Take  by mouth.      Vitamin D (Cholecalciferol) 25 MCG (1000 UT) capsule   50 mcg, Oral, Daily                   **Dragon Disclaimer:   Much of this encounter note is an electronic transcription/translation of spoken language to printed text. The electronic translation of spoken language may permit erroneous, or at times, nonsensical words or phrases to be inadvertently transcribed. Although I have reviewed the note for such errors, some may still exist.

## 2025-03-10 ENCOUNTER — OFFICE VISIT (OUTPATIENT)
Dept: INTERNAL MEDICINE | Age: 70
End: 2025-03-10
Payer: MEDICARE

## 2025-03-10 VITALS
HEIGHT: 63 IN | OXYGEN SATURATION: 100 % | WEIGHT: 166 LBS | DIASTOLIC BLOOD PRESSURE: 80 MMHG | TEMPERATURE: 96.9 F | SYSTOLIC BLOOD PRESSURE: 126 MMHG | HEART RATE: 66 BPM | BODY MASS INDEX: 29.41 KG/M2

## 2025-03-10 DIAGNOSIS — R73.03 PREDIABETES: ICD-10-CM

## 2025-03-10 DIAGNOSIS — M81.0 OSTEOPOROSIS, UNSPECIFIED OSTEOPOROSIS TYPE, UNSPECIFIED PATHOLOGICAL FRACTURE PRESENCE: ICD-10-CM

## 2025-03-10 DIAGNOSIS — R29.898 WEAKNESS OF LEFT UPPER EXTREMITY: ICD-10-CM

## 2025-03-10 DIAGNOSIS — E78.5 HYPERLIPIDEMIA, UNSPECIFIED HYPERLIPIDEMIA TYPE: ICD-10-CM

## 2025-03-10 DIAGNOSIS — E55.9 VITAMIN D DEFICIENCY: ICD-10-CM

## 2025-03-10 DIAGNOSIS — M25.512 ACUTE PAIN OF LEFT SHOULDER: Primary | ICD-10-CM

## 2025-03-10 LAB
25(OH)D3+25(OH)D2 SERPL-MCNC: 29.4 NG/ML (ref 30–100)
ALBUMIN SERPL-MCNC: 4.1 G/DL (ref 3.5–5.2)
ALBUMIN/GLOB SERPL: 1.6 G/DL
ALP SERPL-CCNC: 66 U/L (ref 39–117)
ALT SERPL-CCNC: 17 U/L (ref 1–33)
AST SERPL-CCNC: 24 U/L (ref 1–32)
BILIRUB SERPL-MCNC: 0.3 MG/DL (ref 0–1.2)
BUN SERPL-MCNC: 17 MG/DL (ref 8–23)
BUN/CREAT SERPL: 18.7 (ref 7–25)
CALCIUM SERPL-MCNC: 9.1 MG/DL (ref 8.6–10.5)
CHLORIDE SERPL-SCNC: 108 MMOL/L (ref 98–107)
CHOLEST SERPL-MCNC: 174 MG/DL (ref 0–200)
CHOLEST/HDLC SERPL: 2.49 {RATIO}
CO2 SERPL-SCNC: 23.8 MMOL/L (ref 22–29)
CREAT SERPL-MCNC: 0.91 MG/DL (ref 0.57–1)
EGFRCR SERPLBLD CKD-EPI 2021: 67.6 ML/MIN/1.73
GLOBULIN SER CALC-MCNC: 2.6 GM/DL
GLUCOSE SERPL-MCNC: 87 MG/DL (ref 65–99)
HBA1C MFR BLD: 6 % (ref 4.8–5.6)
HDLC SERPL-MCNC: 70 MG/DL (ref 40–60)
LDLC SERPL CALC-MCNC: 94 MG/DL (ref 0–100)
POTASSIUM SERPL-SCNC: 4.3 MMOL/L (ref 3.5–5.2)
PROT SERPL-MCNC: 6.7 G/DL (ref 6–8.5)
SODIUM SERPL-SCNC: 143 MMOL/L (ref 136–145)
TRIGL SERPL-MCNC: 49 MG/DL (ref 0–150)
VLDLC SERPL CALC-MCNC: 10 MG/DL (ref 5–40)

## 2025-03-10 PROCEDURE — 99214 OFFICE O/P EST MOD 30 MIN: CPT | Performed by: NURSE PRACTITIONER

## 2025-03-10 PROCEDURE — G2211 COMPLEX E/M VISIT ADD ON: HCPCS | Performed by: NURSE PRACTITIONER

## 2025-03-10 PROCEDURE — 3079F DIAST BP 80-89 MM HG: CPT | Performed by: NURSE PRACTITIONER

## 2025-03-10 PROCEDURE — 1126F AMNT PAIN NOTED NONE PRSNT: CPT | Performed by: NURSE PRACTITIONER

## 2025-03-10 PROCEDURE — 3074F SYST BP LT 130 MM HG: CPT | Performed by: NURSE PRACTITIONER

## 2025-03-10 NOTE — PROGRESS NOTES
I N T E R N A L  M E D I C I N E  [facility]      ENCOUNTER DATE:  03/10/2025    Julissa CARLISLE Black / 69 y.o. / female      CHIEF COMPLAINT / REASON FOR OFFICE VISIT     Hypertension, Hyperlipidemia, and Prediabetes      ASSESSMENT & PLAN     Problem List Items Addressed This Visit       Hyperlipidemia    Relevant Medications    atorvastatin (Lipitor) 40 MG tablet    Other Relevant Orders    Comprehensive Metabolic Panel    Lipid Panel With / Chol / HDL Ratio    Vitamin D deficiency    Relevant Medications    Vitamin D, Cholecalciferol, 25 MCG (1000 UT) capsule    Other Relevant Orders    Vitamin D,25-Hydroxy    Prediabetes    Relevant Orders    Hemoglobin A1c     Other Visit Diagnoses         Acute pain of left shoulder    -  Primary    Relevant Orders    Ambulatory Referral to Orthopedic Surgery    Ambulatory Referral to Physical Therapy for Evaluation & Treatment      Weakness of left upper extremity        Relevant Orders    Ambulatory Referral to Orthopedic Surgery    Ambulatory Referral to Physical Therapy for Evaluation & Treatment      Osteoporosis, unspecified osteoporosis type, unspecified pathological fracture presence        Relevant Orders    DEXA Bone Density Axial          Orders Placed This Encounter   Procedures    DEXA Bone Density Axial    Comprehensive Metabolic Panel    Lipid Panel With / Chol / HDL Ratio    Hemoglobin A1c    Vitamin D,25-Hydroxy    Ambulatory Referral to Orthopedic Surgery    Ambulatory Referral to Physical Therapy for Evaluation & Treatment     No orders of the defined types were placed in this encounter.      SUMMARY/DISCUSSION  Patient will keep follow-up with OB/GYN and cardiology regularly.  Weakness in the left upper extremity and pain referral to physical therapy along with orthopedics.  Continue all other chronic medications  Plans to have COVID-19 vaccine downstairs in the pharmacy.    Next Appointment with me: Visit date not found    Return for Next  "scheduled follow up.      VITAL SIGNS     Visit Vitals  /80   Pulse 66   Temp 96.9 °F (36.1 °C)   Ht 160 cm (63\")   Wt 75.3 kg (166 lb)   SpO2 100%   BMI 29.41 kg/m²       Wt Readings from Last 3 Encounters:   03/10/25 75.3 kg (166 lb)   11/01/24 74.8 kg (165 lb)   09/09/24 74.7 kg (164 lb 9.6 oz)     Body mass index is 29.41 kg/m².      MEDICATIONS AT THE TIME OF OFFICE VISIT     Current Outpatient Medications on File Prior to Visit   Medication Sig    alendronate (FOSAMAX) 70 MG tablet TAKE 1 TABLET BY MOUTH ONCE WEEKLY ON AN EMPTY STOMACH BEFORE BREAKFAST. REMAIN UPRIGHT FOR 30 MINUTES AND TAKE WITH 8 OUNCES OF WATER    ascorbic acid (VITAMIN C) 1000 MG tablet Take 1 tablet by mouth Daily.    atorvastatin (Lipitor) 40 MG tablet Take 1 tablet by mouth Daily.    CVS POTASSIUM GLUCONATE PO Take  by mouth.    Misc Natural Products (EQ GLUCOSAMINE-CHONDROITIN-MSM PO) Take  by mouth.    Multiple Vitamin (MULTI-DAY VITAMINS PO) Take  by mouth.    Vitamin D, Cholecalciferol, 25 MCG (1000 UT) capsule Take 2 capsules by mouth Daily.     No current facility-administered medications on file prior to visit.          HISTORY OF PRESENT ILLNESS     Hyperlipidemia, atorvastatin was increased to 20mg daily, LDL improved from 136 to 107 with modified medication regimen.     Prediabetes with hemoglobin A1c at 5.7. No medication treatment at this time.      Vitamin D deficiency on 2000 units daily, last level 29.4.  States that she is inconsistent with her vitamin D.     Patient was seen by her OB/GYN, Dr. Page for routine well woman, colon cancer screening and menopausal osteoporosis.  Pap in 2020 was negative.  DEXA scan was completed August 4, 2023 osteopenia progressed osteoporosis and she was started on Fosamax weekly.  Tolerating well. Mammogram August 1, 2024, benign.  Has new patient appointment with Dr. Locke here in our office in August.     Colonoscopy 2016 with 10-year recall.     Ultrasound of the thyroid August " 4, 2023 thyroid showing post right thyroidectomy removal with no suspicious lesion.  Left-sided nodules require no further follow-up.    Was seen by her cardiologist Dr. Cross November 1, 2024.  Blood pressure well-controlled.  EKG stable.  Following 1 to 2 years.    States that she was shoveling snow in January, since she has had decreased range of motion of the left upper extremity weakness.  Pain in the anterior shoulder.     REVIEW OF SYSTEMS     Constitutional neg except per HPI   Resp neg  CV neg     PHYSICAL EXAMINATION     Physical Exam  Constitutional  No distress  Cardiovascular Rate  normal . Rhythm: regular . Heart sounds:  normal  Pulmonary/Chest  Effort normal. Breath sounds:  normal  Psychiatric  Alert. Judgment and thought content normal. Mood normal      REVIEWED DATA     Labs:   Lab Results   Component Value Date    GLUCOSE 93 09/09/2024    BUN 19 09/09/2024    CREATININE 0.84 09/09/2024    EGFR 74.9 09/09/2024    BCR 22.6 09/09/2024    K 4.3 09/09/2024    CO2 27.6 09/09/2024    CALCIUM 9.5 09/09/2024    ALBUMIN 4.5 09/09/2024    BILITOT 0.5 09/09/2024    AST 26 09/09/2024    ALT 16 09/09/2024     Lab Results   Component Value Date    WBC 4.68 09/09/2024    HGB 13.5 09/09/2024    HCT 41.4 09/09/2024    MCV 92.4 09/09/2024     09/09/2024     Lab Results   Component Value Date    CHOL 183 11/15/2018    CHLPL 192 09/09/2024    TRIG 48 09/09/2024    HDL 76 (H) 09/09/2024     (H) 09/09/2024      Lab Results   Component Value Date    TSH 1.290 09/09/2024    C2CIWXC 5.2 03/27/2017    THYROIDAB 10 12/03/2018     Brief Urine Lab Results       None          Lab Results   Component Value Date    HGBA1C 5.70 (H) 09/09/2024       Imaging:           Medical Tests:           Summary of old records / correspondence / consultant report:           Request outside records:

## 2025-03-17 DIAGNOSIS — M81.0 OSTEOPOROSIS, UNSPECIFIED OSTEOPOROSIS TYPE, UNSPECIFIED PATHOLOGICAL FRACTURE PRESENCE: ICD-10-CM

## 2025-03-17 RX ORDER — ALENDRONATE SODIUM 70 MG/1
70 TABLET ORAL WEEKLY
Qty: 4 TABLET | Refills: 1 | Status: SHIPPED | OUTPATIENT
Start: 2025-03-17

## 2025-03-26 NOTE — PROGRESS NOTES
New Shoulder      Patient: Julissa Armenta        YOB: 1955    Medical Record Number: [id number]        Chief Complaints: Left shoulder pain      History of Present Illness: This is a very nice 69-year-old who is a retired  who presents complaining of left shoulder pain she is right-hand dominant is been ongoing since January she states she was shoveling snow in a couple days later noticed the onset of her symptoms it is worsened she has no night pain past medical history is listed below and reviewed by me      Allergies:   Allergies   Allergen Reactions    Cephalexin Rash       Medications:   Home Medications:  Current Outpatient Medications on File Prior to Visit   Medication Sig    alendronate (FOSAMAX) 70 MG tablet TAKE 1 TABLET BY MOUTH ONCE WEEKLY ON AN EMPTY STOMACH BEFORE BREAKFAST. REMAIN UPRIGHT FOR 30 MINUTES AND TAKE WITH 8 OUNCES OF WATER    ascorbic acid (VITAMIN C) 1000 MG tablet Take 1 tablet by mouth Daily.    atorvastatin (LIPITOR) 40 MG tablet TAKE 1 TABLET BY MOUTH DAILY    CVS POTASSIUM GLUCONATE PO Take  by mouth.    Misc Natural Products (EQ GLUCOSAMINE-CHONDROITIN-MSM PO) Take  by mouth.    Multiple Vitamin (MULTI-DAY VITAMINS PO) Take  by mouth.    Vitamin D, Cholecalciferol, 25 MCG (1000 UT) capsule Take 2 capsules by mouth Daily.     No current facility-administered medications on file prior to visit.     Current Medications:  Scheduled Meds:  Continuous Infusions:No current facility-administered medications for this visit.    PRN Meds:.    Past Medical History:   Diagnosis Date    Abnormal Pap smear of cervix 06/05/2020    ASCUS    Allergic Cephlexion    Ankle sprain 1996    Disease of thyroid gland     Esophageal dysphagia     Fracture of wrist 2015    Gastroesophageal reflux disease with esophagitis without hemorrhage 01/12/2023    Goiter     Hyperlipidemia     [place]akes Medication    Hypertension     no meds currently    Osteopenia 07/22/2020     Osteoporosis 2023        Past Surgical History:   Procedure Laterality Date    COLONOSCOPY      2015    ENDOSCOPY N/A 2022    Procedure: ESOPHAGOGASTRODUODENOSCOPY WITH BIOPSIES AND HOT SNARE POLYPECTOMY AND BRUSHING AND 12MM- 18MM BALLOON DILATION;  Surgeon: Nayla Pickens MD;  Location: [facility] ENDOSCOPY;  Service: Gastroenterology;  Laterality: N/A;  PRE- DYSPHAGIA  POST- RULE OUT CANDIDA, GASTRIC POLYP, GASTRITIS, ESOPHAEAL STRICTURE    FINGER SURGERY      FRACTURE SURGERY      HAND SURGERY      THYROID SURGERY      TONSILLECTOMY  late         Social History     Occupational History    Occupation: [employer]    Tobacco Use    Smoking status: Never    Smokeless tobacco: Never   Vaping Use    Vaping status: Never Used   Substance and Sexual Activity    Alcohol use: No    Drug use: No    Sexual activity: Not Currently     Partners: Male     Birth control/protection: Abstinence, Post-menopausal      Social History     Social History Narrative    Not on file        Family History   Problem Relation Age of Onset    Hypertension Mother             Diabetes Mother             Multiple myeloma Mother     Vision loss Mother             Cancer Mother             Diabetes Brother     Hypertension Brother     Liver cancer Brother     Hypertension Brother             Cancer Brother             Hypertension Brother     Hypertension Sister     Heart disease Sister     Hypertension Sister     Heart attack Sister     Colon cancer Maternal Grandmother     Cancer Maternal Grandmother             Hypertension Sister     Hypertension Sister     Malig Hyperthermia Neg Hx     Breast cancer Neg Hx              Review of Systems:     Review of Systems      Physical Exam: 69 y.o. female  General Appearance:    Alert, cooperative, in no acute distress                   Vitals:    25 0814   Temp: 97.5 °F (36.4 °C)   TempSrc: Temporal   Weight: 74.9 kg  "(165 lb 3.2 oz)   Height: 160 cm (63\")   PainSc: 0-No pain   PainLoc: Shoulder      Patient is alert and read ×3 no acute distress appears her above-listed at height weight and age.  Affect is normal respiratory rate is normal unlabored. Heart rate regular rate rhythm, sclera, dentition and hearing are normal for the purpose of this exam.    Ortho Exam  Physical exam of the left shoulder reveals no overlying skin changes no lymphedema no lymphadenopathy.  Patient has active flexion 180 with mild symptoms abduction is similar external rotation is to 50 and internal rotation to the mid lumbar spine with mild symptoms.  Patient has good rotator cuff strength 4+ over 5 with isometric strength testing with pain.  Patient has a positive impingement and a positive Torres sign.  Patient has good cervical range of motion which is full and asymptomatic no radicular symptoms.  Patient has a normal elbow exam.  Good distal pulses are presentPatient has pain with overhead activity and a positive Neer sign and a positive empty can sign  They have a positive drop arm any definitive painful arc   Procedures          Radiology:   AP, Scapular Y and Axillary Lateral of the left shoulder were ordered/reviewed to evauate shoulder pain.  I have no comparative films she has some acromioclavicular arthritis as well as some sclerosis at the insertion the cuff no acute pathology  Imaging Results (Most Recent)       Procedure Component Value Units Date/Time    XR Shoulder 2+ View Left [[id number]] Resulted: 04/14/25 0847     Updated: 04/14/25 0847    Impression:      Ordering physician's impression is located in the Encounter Note dated 04/14/25. X-ray performed in the DR room.          Assessment/Plan: Left shoulder pain I think this is impingement.  Capsulitis is in the back of Shahbaz however I think if it happened 4 months ago she would be a lot more limited in her range of motion but she is a little limited on internal rotation so " remains in my differential.  We talked about an injection she wants to hold off on that therefore we will start her on some meloxicam with strict precautions for short period of time have her see physical therapy and I will see her back in 4 weeks she is already seeing therapy once so I will see her back in 4 weeks if she fails to improve could consider other means of testing if her clinical picture is suggestive of capsulitis could consider injection  Prescription medication given with instructions, warnings, and precautions.

## 2025-04-06 DIAGNOSIS — E78.5 HYPERLIPIDEMIA, UNSPECIFIED HYPERLIPIDEMIA TYPE: ICD-10-CM

## 2025-04-06 RX ORDER — ATORVASTATIN CALCIUM 40 MG/1
40 TABLET, FILM COATED ORAL DAILY
Qty: 90 TABLET | Refills: 1 | Status: SHIPPED | OUTPATIENT
Start: 2025-04-06

## 2025-04-08 ENCOUNTER — TREATMENT (OUTPATIENT)
Age: 70
End: 2025-04-08
Payer: MEDICARE

## 2025-04-08 DIAGNOSIS — M75.42 ROTATOR CUFF IMPINGEMENT SYNDROME OF LEFT SHOULDER: ICD-10-CM

## 2025-04-08 DIAGNOSIS — M25.512 ACUTE PAIN OF LEFT SHOULDER: Primary | ICD-10-CM

## 2025-04-08 NOTE — PATIENT INSTRUCTIONS
Access Code: [email or web address]  URL: [email or web address]  Date: 04/08/2025  Prepared by: Denisse Mac    Exercises  - Supine Shoulder Flexion PROM  - 2 x daily - 7 x weekly - 2 sets - 10 reps  - Supine Active Assistive Single Arm Shoulder Protraction  - 1 x daily - 7 x weekly - 2 sets - 10 reps  - Seated Scapular Retraction  - 2 x daily - 7 x weekly - 2 sets - 10 reps - 3 s hold  - Scapular Wall Slides  - 1 x daily - 7 x weekly - 1 sets - 10 reps  - Standing Shoulder and Trunk Flexion at Table  - 2 x daily - 7 x weekly - 1 sets - 10 reps - 5 s hold  - Doorway Pec Stretch at 60 Elevation  - 2 x daily - 7 x weekly - 1 sets - 4 reps - 20 s hold

## 2025-04-08 NOTE — PROGRESS NOTES
"    Physical Therapy Initial Evaluation and Plan of Care  [address]  [facility] [address]  P: [phone]  F: [phone]    Patient: Julissa Armenta   : 1955  Diagnosis/ICD-10 Code:  Acute pain of left shoulder [M25.512]  Referring practitioner: Michael Jones DNP, APRN  Date of Initial Visit: 2025  Today's Date: 2025  Patient seen for 1 session         Visit Diagnoses:    ICD-10-CM ICD-9-CM   1. Acute pain of left shoulder  M25.512 719.41   2. Rotator cuff impingement syndrome of left shoulder  M75.42 726.10         Subjective Questionnaire: QuickDASH: 18.18%      Subjective Evaluation    History of Present Illness  Mechanism of injury: Pt is a 68 y/o female who presents to PT with c/o left shoulder pain that began ~in January after the big snow the first week of the month and was shoveling snow and a few days later she felt a pain in shoulder. She reports it is in the front of her shoulder and she describes her pain as \"annoying\" and that it has improved a little with time. Pt reports she has difficulty with putting on a coat, raising her arm, and quick movements. She denies numbness/tingling in her upper extremity. She reports bringing it back down from a raised position it catches and it causes her pain.      Patient Occupation: Retired [employer] / Pain  Current pain ratin  At worst pain ratin  Quality: dull ache  Relieving factors: rest and change in position  Aggravating factors: repetitive movement, outstretched reach, sleeping, movement, lifting and overhead activity  Symptom course: improving.    Social Support  Lives with: alone    Hand dominance: right    Diagnostic Tests  No diagnostic tests performed  Abnormal x-ray: no recent imaging.    Treatments  Current treatment: physical therapy  Patient Goals  Patient goals for therapy: increased strength, independence with ADLs/IADLs, return to sport/leisure activities, increased motion and " decreased pain           Past Medical History:   Diagnosis Date    Abnormal Pap smear of cervix 06/05/2020    ASCUS    Allergic Cephlexion    Disease of thyroid gland     Esophageal dysphagia     Gastroesophageal reflux disease with esophagitis without hemorrhage 01/12/2023    Goiter     Hyperlipidemia     Teakes Medication    Hypertension     no meds currently    Osteopenia 07/22/2020    Osteoporosis 08/04/2023     Past Surgical History:   Procedure Laterality Date    COLONOSCOPY      2015    ENDOSCOPY N/A 04/13/2022    Procedure: ESOPHAGOGASTRODUODENOSCOPY WITH BIOPSIES AND HOT SNARE POLYPECTOMY AND BRUSHING AND 12MM- 18MM BALLOON DILATION;  Surgeon: Nayla Pickens MD;  Location: [facility] ENDOSCOPY;  Service: Gastroenterology;  Laterality: N/A;  PRE- DYSPHAGIA  POST- RULE OUT CANDIDA, GASTRIC POLYP, GASTRITIS, ESOPHAEAL STRICTURE    FINGER SURGERY      FRACTURE SURGERY      THYROID SURGERY         Objective          Postural Observations  Seated posture: good  Standing posture: good      Cervical/Thoracic Screen   Cervical range of motion within normal limits    Neurological Testing     Sensation     Shoulder   Left Shoulder   Intact: light touch    Right Shoulder   Intact: Light touch    Active Range of Motion   Left Shoulder   Flexion: WFL and with pain  Abduction: 150 degrees with pain  External rotation BTH: WFL  Internal rotation BTB: WFL and with pain    Right Shoulder   Normal active range of motion  External rotation BTH: WFL  Internal rotation BTB: WFL    Passive Range of Motion   Left Shoulder   Flexion: 175 degrees   Abduction: 175 degrees   External rotation 90°: 90 (pain at end range) degrees with pain  Internal rotation 90°: 45 degrees with pain    Strength/Myotome Testing     Left Shoulder     Planes of Motion   Flexion: 4+   Extension: 5   Abduction: 4-   Adduction: 5   External rotation at 0°: 4+   Internal rotation at 0°: 4- (c/o pain)     Right Shoulder     Planes of Motion   Flexion: 4+    Extension: 5   Abduction: 4+   Adduction: 5   External rotation at 0°: 5   Internal rotation at 0°: 5     Tests   Cervical     Left   Positive active compression ([place]).     Left Shoulder   Positive empty can, Hawkin's and lift-off.     Ambulation   Weight-Bearing Status   Weight-Bearing Status (Left): full weight bearing   Weight-Bearing Status (Right): full weight-bearing    Assistive device used: none          Assessment & Plan       Assessment  Impairments: abnormal muscle firing, abnormal or restricted ROM, activity intolerance, impaired physical strength, lacks appropriate home exercise program and pain with function   Functional limitations: carrying objects, lifting, sleeping, pulling, pushing, uncomfortable because of pain, reaching behind back, reaching overhead and unable to perform repetitive tasks   Assessment details: Pt is a 68 y/o female who presents to physical therapy with signs and symptoms consistent with acute left shoulder pain, with signs of RTC involvement and impingement as well. Pt has decreased left shoulder ROM. In addition, pt has left UE strength deficits which limits her ability to perform her ADLs/IADLs pain free. Pt has pain and difficulty performing reaching behind her back to put on a coat, raising her arm overhead, and returning her arm by her side after being elevated. She does have occasional painful clicking, but states this is inconsistent and depends on certain movements. Positive Grubbs's test for pain, no clicking during test. She has a positive Torres, empty can, and lift off test. She reports replication of symptoms with resisted internal rotation. However, reports worst pain is 2/10 and reports she has noticed improvement over time. Her condition is stable in nature, but she has multiple co morbidities and personal factors that may affect care. Pt would benefit from skilled physical therapy in order to address the above impairments and activity limitations outlined  in this initial evaluation, in order to decrease pain, improve functional mobility, and return to PLOF.   Barriers to therapy: personal factors: age, retired  and carried bag on left side  Prognosis: good    Goals  Plan Goals: STG 2-6 weeks    1. Quick Dash Score will be <15% to demonstrate subjective improvements in functional activities  2. Pt will be independent in home exercise program  3. Improve 65 degrees or more and pain free of left shoulder PROM   4. Pt will have pain free passive shoulder range of motion on left upper extremity    LTG 6-12 weeks    1. Quick Dash Score will be <5% to demonstrate subjective improvements in functional activities  2. Pt will have negative LUE impingement testing  3. Improve L MMT to 4+/5 in order to show improvement in left upper extremity strength  4. Improve shoulder AROM to 165 degrees of elevation in order to improve reaching overhead pain free  5. Pt will be able to reach behind her back to put on a coat with her left arm without c/o pain      Plan  Therapy options: will be seen for skilled therapy services  Planned modality interventions: cryotherapy, dry needling, TENS, electrical stimulation/Russian stimulation and thermotherapy (hydrocollator packs)  Planned therapy interventions: body mechanics training, ADL retraining, flexibility, functional ROM exercises, home exercise program, IADL retraining, joint mobilization, manual therapy, motor coordination training, neuromuscular re-education, postural training, soft tissue mobilization, strengthening, stretching and therapeutic activities  Frequency: 2x week  Duration in weeks: 12  Treatment plan discussed with: patient  Plan details: 1-2 x a week            Timed:         Manual Therapy:    8     mins  12456;     Therapeutic Exercise:    15     mins  44847;     Neuromuscular Suzanne:    0    mins  46960;    Therapeutic Activity:     0     mins  10373;     Gait Trainin     mins  53693;      Ultrasound:     0     mins  22302;    Ionto                               0    mins   42334  Self Care                       0     mins   70659  Canalith Repos    0     mins 71770      Un-Timed:  Electrical Stimulation:    0     mins  89158 ( );  Dry Needling     0     mins self-pay  Traction     0     mins 18553        Timed Treatment:   23   mins   Total Treatment:     54   mins          PT: Denisse Mac PT     License Number: [id number]  Electronically signed by Denisse Mac PT, 04/08/25, 7:25 AM EDT    Certification Period: 4/8/2025 thru 7/6/2025  I certify that the therapy services are furnished while this patient is under my care.  The services outlined above are required by this patient, and will be reviewed every 90 days.         Physician Signature:__________________________________________________    PHYSICIAN: Michael Jones DNP, APRN  NPI: [id number]                                            Please sign and return via fax to [phone] Thank you, [facility].

## 2025-04-14 ENCOUNTER — OFFICE VISIT (OUTPATIENT)
Dept: ORTHOPEDIC SURGERY | Facility: CLINIC | Age: 70
End: 2025-04-14
Payer: MEDICARE

## 2025-04-14 VITALS — BODY MASS INDEX: 29.27 KG/M2 | HEIGHT: 63 IN | TEMPERATURE: 97.5 F | WEIGHT: 165.2 LBS

## 2025-04-14 DIAGNOSIS — M75.42 IMPINGEMENT SYNDROME OF LEFT SHOULDER: Primary | ICD-10-CM

## 2025-04-14 PROCEDURE — 1159F MED LIST DOCD IN RCRD: CPT | Performed by: ORTHOPAEDIC SURGERY

## 2025-04-14 PROCEDURE — 73030 X-RAY EXAM OF SHOULDER: CPT | Performed by: ORTHOPAEDIC SURGERY

## 2025-04-14 PROCEDURE — 1160F RVW MEDS BY RX/DR IN RCRD: CPT | Performed by: ORTHOPAEDIC SURGERY

## 2025-04-14 PROCEDURE — 99204 OFFICE O/P NEW MOD 45 MIN: CPT | Performed by: ORTHOPAEDIC SURGERY

## 2025-04-14 RX ORDER — MELOXICAM 15 MG/1
TABLET ORAL
Qty: 30 TABLET | Refills: 0 | Status: SHIPPED | OUTPATIENT
Start: 2025-04-14

## 2025-04-15 ENCOUNTER — TREATMENT (OUTPATIENT)
Age: 70
End: 2025-04-15
Payer: MEDICARE

## 2025-04-15 DIAGNOSIS — M75.42 ROTATOR CUFF IMPINGEMENT SYNDROME OF LEFT SHOULDER: ICD-10-CM

## 2025-04-15 DIAGNOSIS — M25.512 ACUTE PAIN OF LEFT SHOULDER: Primary | ICD-10-CM

## 2025-04-15 NOTE — PROGRESS NOTES
"Physical Therapy Daily Treatment Note  [address]  [facility] [address]  P: [phone]  F: [phone]    Patient: Julissa Armenta   : 1955  Referring practitioner: Michael Jones DNP, GABINO  Date of Initial Visit: Type: THERAPY  Noted: 2025  Today's Date: 4/15/2025  Patient seen for 2 sessions       Visit Diagnoses:    ICD-10-CM ICD-9-CM   1. Acute pain of left shoulder  M25.512 719.41   2. Rotator cuff impingement syndrome of left shoulder  M75.42 726.10       Julissa Armenta reports:     Subjective   Pt reports that her shoulder feels \"okay\" today. Pt reports that she saw the doctor yesterday and that she prescribed her some medication and pt reports that she thinks she will do therapy today and Thursday and then just do her home exercises. \"But I need to think about it 9 out of 10 I keep them all I am just going to wait and see\". \"What bothers me is putting on a coat\". \"It's more annoying than anything\".   Objective   See Exercise, Manual, and Modality Logs for complete treatment.       Assessment/Plan  Pt tolerated therapeutic interventions well. Demonstrates good understanding of home exercise program and compliance with performing. Updated HEP to include resisted rows, sleeper stretch, standing dowel IR, and shld IR with strap. Pt benefits from verbal and tactile cueing in therapeutic interventions to ensure proper exercise performance. Issued lime resistance band for home use for the rows. Would continue to benefit from PT to improve her left shoulder range of motion in order to improve her ability to put on a coat pain free. Continue per POC.    Timed:         Manual Therapy:    11     mins  88584;     Therapeutic Exercise:    20     mins  78150;     Neuromuscular Suzanne:    0    mins  16620;    Therapeutic Activity:     8     mins  12217;     Gait Trainin     mins  14594;     Ultrasound:     0     mins  61453;    Ionto                               0    mins   " 46431  Self Care                       0     mins   55039  Canalith Repos    0     mins 87385      Un-Timed:  Electrical Stimulation:    0     mins  51721 ( );  Dry Needling     0     mins self-pay  Traction     0     mins 75882      Timed Treatment:   39   mins   Total Treatment:     39   mins    Denisse Mac, PT  [place] License: [id number]

## 2025-04-17 ENCOUNTER — TREATMENT (OUTPATIENT)
Age: 70
End: 2025-04-17
Payer: MEDICARE

## 2025-04-17 DIAGNOSIS — M75.42 ROTATOR CUFF IMPINGEMENT SYNDROME OF LEFT SHOULDER: ICD-10-CM

## 2025-04-17 DIAGNOSIS — M25.512 ACUTE PAIN OF LEFT SHOULDER: Primary | ICD-10-CM

## 2025-04-17 NOTE — PROGRESS NOTES
"Physical Therapy Daily Treatment Note  [address]  [facility] [address]  P: [phone]  F: [phone]    Patient: Julissa Armenta   : 1955  Referring practitioner: Michael Jones DNP, GABINO  Date of Initial Visit: Type: THERAPY  Noted: 2025  Today's Date: 2025  Patient seen for 3 sessions       Visit Diagnoses:    ICD-10-CM ICD-9-CM   1. Acute pain of left shoulder  M25.512 719.41   2. Rotator cuff impingement syndrome of left shoulder  M75.42 726.10       Julissa Armenta reports:     Subjective   Pt reports that \"it feels good, that meloxicam really helps\".   Objective   See Exercise, Manual, and Modality Logs for complete treatment.       Assessment/Plan  Pt is progressing well in her left shoulder mobility. She is doing well in all planes, but still limited in internal rotation, benefits from passive stretching and dowel/strap interventions to help improve this ROM. Added pulleys to improve mobility as well and she tolerated this without adverse reactions. Updated home exercise program to include supine resisted horizontal abduction and supine resisted shoulder flexion with orange band resistance. Pt issued new handout and orange band to use for home. She is independent in her home exercise program and can perform this with good form. She should continue to improve with PT. Continue to progress towards goals.     Timed:         Manual Therapy:    9     mins  23180;     Therapeutic Exercise:    20     mins  59045;     Neuromuscular Suzanne:    0    mins  05251;    Therapeutic Activity:     9     mins  68919;     Gait Trainin     mins  76772;     Ultrasound:     0     mins  63914;    Ionto                               0    mins   30113  Self Care                       0     mins   96737  Canalith Repos    0     mins 27870      Un-Timed:  Electrical Stimulation:    0     mins  45733 ( );  Dry Needling     0     mins self-pay  Traction     0     mins " 28740      Timed Treatment:   38   mins   Total Treatment:     38   mins    Denisse Mac, PT  [place] License: [id number]

## 2025-04-18 ENCOUNTER — PATIENT ROUNDING (BHMG ONLY) (OUTPATIENT)
Dept: SPORTS MEDICINE | Facility: CLINIC | Age: 70
End: 2025-04-18
Payer: MEDICARE

## 2025-04-18 NOTE — PROGRESS NOTES
A [employer] Message has been sent to the patient for PATIENT ROUNDING with [facility]

## 2025-04-22 ENCOUNTER — TREATMENT (OUTPATIENT)
Age: 70
End: 2025-04-22
Payer: MEDICARE

## 2025-04-22 DIAGNOSIS — M25.512 ACUTE PAIN OF LEFT SHOULDER: Primary | ICD-10-CM

## 2025-04-22 DIAGNOSIS — M75.42 ROTATOR CUFF IMPINGEMENT SYNDROME OF LEFT SHOULDER: ICD-10-CM

## 2025-04-22 PROCEDURE — 97535 SELF CARE MNGMENT TRAINING: CPT | Performed by: PHYSICAL THERAPIST

## 2025-04-22 PROCEDURE — 97110 THERAPEUTIC EXERCISES: CPT | Performed by: PHYSICAL THERAPIST

## 2025-04-22 PROCEDURE — 97140 MANUAL THERAPY 1/> REGIONS: CPT | Performed by: PHYSICAL THERAPIST

## 2025-04-22 PROCEDURE — 97530 THERAPEUTIC ACTIVITIES: CPT | Performed by: PHYSICAL THERAPIST

## 2025-04-22 NOTE — PROGRESS NOTES
Physical Therapy Daily Treatment Note    [facility]  [address]  [address]  [place] [address]     Patient: Julissa Armenta   : 1955  Referring practitioner: Michael Jones DNP, APRN  Date of Initial Visit: Type: THERAPY  Noted: 2025  Today's Date: 2025  Patient seen for 4 sessions         Julissa Armenta reports: left shoulder moving better and less pain/discomfort since starting PT.         Subjective     Objective   See Exercise, Manual, and Modality Logs for complete treatment.   -added weight to supine serratus punches  -increased t band with scap retraction saws to blue, issued to pt for home use.  -Added:  L sh isometric IR/ER with orange t band walkaways x 10 each; pec door way stretch, cane assisted ER L UE    Exercise rationale/ pain free exercise performance  Anatomy and structure of affected musculature  Posture/Postural awareness - scapular positioning  Lumbar support/thoracic support - towel roll  Sleeping positions with pillows  Alternate exercise positions -ROM  Verbal/Tactile cues to ensure correct exercise performance/technique        Assessment/Plan Subjectively she reports left shoulder moving better and having less overall pain discomfort since starting PT.  Objectively, she has noted to have some tightness in external rotation of left upper extremity with manual interventions.  Able to progress and perform exercises to include T band resistance with isometric internal and external rotation with walkways left shoulder as well as able to add isotonic weight with serratus punches without increased symptoms or discomfort.  Should continue to improve with further rehab efforts.        Progress strengthening /stabilization /functional activity L UE as symptoms allow.           Timed:  Manual Therapy: 12        mins  72155;  Therapeutic Exercise:    20     mins  74064;     Neuromuscular Suzanne:        mins  14774;    Therapeutic Activity:    10       mins  31281;     Gait Training:           mins  83485;     Ultrasound:          mins  46424;    Self Care                    _12__      mins 83938    Untimed:  Electrical Stimulation:         mins  56921 ( );  Mechanical Traction:         mins  74322;     Timed Treatment: 54     mins   Total Treatment:    54    mins  Refugio Baker PTA  Physical Therapist  Assistant  [id number]

## 2025-04-24 ENCOUNTER — TREATMENT (OUTPATIENT)
Age: 70
End: 2025-04-24
Payer: MEDICARE

## 2025-04-24 DIAGNOSIS — M25.512 ACUTE PAIN OF LEFT SHOULDER: Primary | ICD-10-CM

## 2025-04-24 DIAGNOSIS — M75.42 ROTATOR CUFF IMPINGEMENT SYNDROME OF LEFT SHOULDER: ICD-10-CM

## 2025-04-24 PROCEDURE — 97530 THERAPEUTIC ACTIVITIES: CPT | Performed by: PHYSICAL THERAPIST

## 2025-04-24 PROCEDURE — 97110 THERAPEUTIC EXERCISES: CPT | Performed by: PHYSICAL THERAPIST

## 2025-04-24 NOTE — PROGRESS NOTES
"Physical Therapy Daily Treatment Note    [facility]  [address]  [address]  [place] [address]     Patient: Julissa Armenta   : 1955  Referring practitioner: Michael Jones DNP, APRN  Date of Initial Visit: Type: THERAPY  Noted: 2025  Today's Date: 2025  Patient seen for 5 sessions         Julissa Armenta reports: No new left shoulder complaints of discomfort post last session of physical therapy.  States \"I could tell I did more but I only had some fatigue in that arm versus pain\".        Subjective     Objective   See Exercise, Manual, and Modality Logs for complete treatment.   - increased t band to lime with supine horizontal abd and supine shoulder flexion exercise.  -increased supine serratus punches to 5 lbs each UE.    Assessment/Plan  Compliant/Cooperative with rehab efforts to date.  Subjectively reports no increased symptoms or discomfort with therapeutic exercise today.  Able to perform increased exercise/functional activity without increased L shoulder discomfort, only fatigue of isolated musculature.  Benefits from verbal/tactile cues to ensure proper scapular positioning with t band activities..          Progress per Plan of Care and Progress strengthening /stabilization /functional activity as symptoms allow.            Timed:  Manual Therapy:         mins  67192;  Therapeutic Exercise:    28     mins  98024;     Neuromuscular Suzanne:        mins  86191;    Therapeutic Activity:   12       mins  16814;     Gait Training:           mins  80786;     Ultrasound:          mins  00812;    Self Care                    ___      mins 62676    Untimed:  Electrical Stimulation:         mins  61072 ( );  Mechanical Traction:         mins  06972;     Timed Treatment:  40   mins   Total Treatment:      40  mins  Refugio Baker PTA  Physical Therapist  Assistant  [id number]  "

## 2025-04-29 ENCOUNTER — TREATMENT (OUTPATIENT)
Age: 70
End: 2025-04-29
Payer: MEDICARE

## 2025-04-29 DIAGNOSIS — M75.42 ROTATOR CUFF IMPINGEMENT SYNDROME OF LEFT SHOULDER: ICD-10-CM

## 2025-04-29 DIAGNOSIS — M25.512 ACUTE PAIN OF LEFT SHOULDER: Primary | ICD-10-CM

## 2025-04-29 PROCEDURE — 97530 THERAPEUTIC ACTIVITIES: CPT | Performed by: PHYSICAL THERAPIST

## 2025-04-29 PROCEDURE — 97110 THERAPEUTIC EXERCISES: CPT | Performed by: PHYSICAL THERAPIST

## 2025-04-29 NOTE — PROGRESS NOTES
"Physical Therapy Daily Treatment Note  [address]  [facility] [address]  P: [phone]  F: [phone]    Patient: Julissa Armenta   : 1955  Referring practitioner: Michael Jones DNP, GABINO  Date of Initial Visit: Type: THERAPY  Noted: 2025  Today's Date: 2025  Patient seen for 6 sessions       Visit Diagnoses:    ICD-10-CM ICD-9-CM   1. Acute pain of left shoulder  M25.512 719.41   2. Rotator cuff impingement syndrome of left shoulder  M75.42 726.10       Julissa Armenta reports:     Subjective   Pt reports that her shoulder is doing \"good\" and that she does the \"coat test\" to see how her shoulder does putting it on/off and she reports it is good. Pt reports her pain at most 1/10.   Objective   See Exercise, Manual, and Modality Logs for complete treatment.   LUE AROM-full and pain free in all planes    Assessment/Plan  Pt has full and pain free active motion in her left shoulder which is an improvement since her first visit. Pt at initial evaluation had 150 degrees of abduction and pain with elevation and reaching behind her back. She continues to be independent in her HEP and updated HEP today to include RTC isotonic strengthening with arm in neutral (orange band for ER and lime band for IR). Verbal cueing during IR to prevent trunk rotation when internally rotating L shoulder, does well with cue and maintains good form. Pt continues to benefit from PT to continue to progress towards her goals. Continue to gradually progress strengthening as tolerated.     Timed:         Manual Therapy:    0     mins  25509;     Therapeutic Exercise:    31     mins  02110;     Neuromuscular Suzanne:    0    mins  65486;    Therapeutic Activity:     9     mins  86812;     Gait Trainin     mins  17283;     Ultrasound:     0     mins  95052;    Ionto                               0    mins   51117  Self Care                       0     mins   31125  Canalith Repos    0     mins " 30027      Un-Timed:  Electrical Stimulation:    0     mins  89479 ( );  Dry Needling     0     mins self-pay  Traction     0     mins 24811      Timed Treatment:   40   mins   Total Treatment:     40   mins    Denisse Mac, PT  [place] License: [id number]

## 2025-05-06 ENCOUNTER — TREATMENT (OUTPATIENT)
Age: 70
End: 2025-05-06
Payer: MEDICARE

## 2025-05-06 DIAGNOSIS — M25.512 ACUTE PAIN OF LEFT SHOULDER: Primary | ICD-10-CM

## 2025-05-06 DIAGNOSIS — M75.42 ROTATOR CUFF IMPINGEMENT SYNDROME OF LEFT SHOULDER: ICD-10-CM

## 2025-05-06 PROCEDURE — 97110 THERAPEUTIC EXERCISES: CPT | Performed by: PHYSICAL THERAPIST

## 2025-05-06 PROCEDURE — 97530 THERAPEUTIC ACTIVITIES: CPT | Performed by: PHYSICAL THERAPIST

## 2025-05-06 NOTE — PROGRESS NOTES
"Physical Therapy Daily Treatment Note  [address]  [facility] [address]  P: [phone]  F: [phone]    Patient: Julissa Armenta   : 1955  Referring practitioner: Michael Jones DNP, GABINO  Date of Initial Visit: Type: THERAPY  Noted: 2025  Today's Date: 2025  Patient seen for 7 sessions       Visit Diagnoses:    ICD-10-CM ICD-9-CM   1. Acute pain of left shoulder  M25.512 719.41   2. Rotator cuff impingement syndrome of left shoulder  M75.42 726.10       Julissa Armenta reports:     Subjective   Pt reports that her shoulder is feeling \"Great\". Pt reports she is having no issues.   Objective   See Exercise, Manual, and Modality Logs for complete treatment.       Assessment/Plan  Progressed home exercise program to include bilateral shoulder external rotation in standing position with lime yellow band resistance. Pt has a lime band for home use and issued new handout of picture of exercise. She is able to perform this with good form. She remains compliant with her home exercise program. Added cross body stretch and upper trapezius stretch to further improve shoulder/tissue mobility after strengthening interventions, did well with this without c/o pain. Pt has full shoulder active IR reaching behind her back prior to session and continues to benefit from L shoulder strengthening to help support her shoulder joint during lifting/carrying tasks. Plan: re-evaluate strength, ROM, goals, and POC next visit.     Timed:         Manual Therapy:    0     mins  68296;     Therapeutic Exercise:    28     mins  95830;     Neuromuscular Suzanne:    0    mins  72200;    Therapeutic Activity:     11     mins  36525;     Gait Trainin     mins  58121;     Ultrasound:     0     mins  85053;    Ionto                               0    mins   75995  Self Care                       0     mins   71944  Canalith Repos    0     mins 82107      Un-Timed:  Electrical Stimulation:    0     " mins  37444 ( );  Dry Needling     0     mins self-pay  Traction     0     mins 91378      Timed Treatment:   39   mins   Total Treatment:     39   mins    Denisse Mac, PT  [place] License: [id number]

## 2025-05-08 ENCOUNTER — TREATMENT (OUTPATIENT)
Age: 70
End: 2025-05-08
Payer: MEDICARE

## 2025-05-08 DIAGNOSIS — M75.42 ROTATOR CUFF IMPINGEMENT SYNDROME OF LEFT SHOULDER: ICD-10-CM

## 2025-05-08 DIAGNOSIS — M25.512 ACUTE PAIN OF LEFT SHOULDER: Primary | ICD-10-CM

## 2025-05-08 NOTE — PROGRESS NOTES
"Re-Assessment / Progress Note / Discharge Summary  [address]  [address]  [facility] [address]  P: [phone]  F: [phone]  Patient: Julissa Armenta   : 1955  Diagnosis/ICD-10 Code:  Acute pain of left shoulder [M25.512]  Referring practitioner: Michael Jones, LOPEZ, APRN  Date of Initial Visit: Episode Type: THERAPY  Noted: 2025    Today's Date: 2025  Patient seen for 8 sessions.    Visit Diagnoses:    ICD-10-CM ICD-9-CM   1. Acute pain of left shoulder  M25.512 719.41   2. Rotator cuff impingement syndrome of left shoulder  M75.42 726.10       Subjective:   Julissa Armenta reports:     Subjective Questionnaire: QuickDASH: 2.27%  Clinical Progress: improved  Home Program Compliance: Yes  Treatment has included: therapeutic exercise, neuromuscular re-education, manual therapy, and therapeutic activity    Subjective   Pt reports \"I am feeling great, I am thinking today is going to be my last day\".  Objective   Active Range of Motion   Left Shoulder   Flexion: WFL   Abduction: WFL  External rotation BTH: WFL  Internal rotation BTB: WFL      Passive Range of Motion   Left Shoulder   Flexion: 175 degrees   Abduction: 175 degrees   External rotation 90°: 90   Internal rotation 90°: 65 degrees     Strength/Myotome Testing     Left Shoulder      Planes of Motion   Flexion: 5   Extension: 5   Abduction: 5  Adduction: 5   External rotation at 90°: 5  Internal rotation at 90 de deg     Tests   Cervical        Left Shoulder   Negative empty can, Hawkin's and lift-off.        Assessment/Plan  Pt is a 68 y/o female who has been attending PT for ~1 month for L shoulder pain. Since initial evaluation she has met all her goals in physical therapy and reports minimal to no shoulder pain during her ADLs/IADLs. She has 5/5 strength in her shoulder during manual muscle testing and good active ROM when elevating her arm and reaching behind her back. Progressed and educated her home exercise program " today and issued her a handout of pictures. She is able to perform all new interventions without c/o shoulder pain and good form. I instructed her to continue to perform her home exercises in order to maintain progress made in PT and she voices understanding. Due to good progress in therapy, she is ready for discharge from formal PT at this time. She will follow up with me in the future if needed. Discharge prognosis is good.   Progress toward previous goals: All Met    Goals  Plan Goals: STG 2-6 weeks     1. Quick Dash Score will be <15% to demonstrate subjective improvements in functional activities MET  2. Pt will be independent in home exercise program MET  3. Improve 65 degrees or more and pain free of left shoulder IR PROM MET  4. Pt will have pain free passive shoulder range of motion on left upper extremity MET     LTG 6-12 weeks     1. Quick Dash Score will be <5% to demonstrate subjective improvements in functional activities MET  2. Pt will have negative LUE impingement testing MET  3. Improve L MMT to 4+/5 in order to show improvement in left upper extremity strength MET  4. Improve shoulder AROM to 165 degrees of elevation in order to improve reaching overhead pain free MET  5. Pt will be able to reach behind her back to put on a coat with her left arm without c/o pain MET    Recommendations: Discharge  Timeframe:  today      PT Signature: Denisse Mac, PT  [place] Lic. # [id number]        [facility] PHYSICAL THERAPY  [address] [place] [id number] [phone] ; Fax Number [phone]    Signature: __________________________________  Michael Jones DNP, APRN    Manual Therapy:     0     mins  29276;  Therapeutic Exercise:     12     mins  06908;     Neuromuscular Suzanne:     0    mins  63986;    Therapeutic Activity:      0     mins  39258;     Gait Trainin     mins  39916;     Ultrasound:      0     mins  93360;    Electrical Stimulation:     0     mins  17782 (  );  Dry Needling      0     mins self-pay  Traction      0     mins 31686  Canalith Repositioning    0     mins 02424  Re-evaluation                             8         mins 48017      Timed Treatment:   12   mins   Total Treatment:     20   mins

## 2025-05-12 DIAGNOSIS — M81.0 OSTEOPOROSIS, UNSPECIFIED OSTEOPOROSIS TYPE, UNSPECIFIED PATHOLOGICAL FRACTURE PRESENCE: ICD-10-CM

## 2025-08-04 ENCOUNTER — PROCEDURE VISIT (OUTPATIENT)
Dept: OBSTETRICS AND GYNECOLOGY | Facility: CLINIC | Age: 70
End: 2025-08-04
Payer: MEDICARE

## 2025-08-04 DIAGNOSIS — Z12.31 VISIT FOR SCREENING MAMMOGRAM: Primary | ICD-10-CM

## 2025-08-05 ENCOUNTER — HOSPITAL ENCOUNTER (OUTPATIENT)
Dept: BONE DENSITY | Facility: HOSPITAL | Age: 70
Discharge: HOME | End: 2025-08-05
Admitting: NURSE PRACTITIONER
Payer: MEDICARE

## 2025-08-05 DIAGNOSIS — M81.0 OSTEOPOROSIS, UNSPECIFIED OSTEOPOROSIS TYPE, UNSPECIFIED PATHOLOGICAL FRACTURE PRESENCE: ICD-10-CM

## 2025-08-05 PROCEDURE — 77080 DXA BONE DENSITY AXIAL: CPT

## 2025-08-11 ENCOUNTER — OFFICE VISIT (OUTPATIENT)
Dept: OBSTETRICS AND GYNECOLOGY | Age: 70
End: 2025-08-11
Payer: MEDICARE

## 2025-08-11 VITALS
DIASTOLIC BLOOD PRESSURE: 90 MMHG | HEIGHT: 63 IN | WEIGHT: 162 LBS | SYSTOLIC BLOOD PRESSURE: 144 MMHG | BODY MASS INDEX: 28.7 KG/M2

## 2025-08-11 DIAGNOSIS — Z11.51 SCREENING FOR HPV (HUMAN PAPILLOMAVIRUS): ICD-10-CM

## 2025-08-11 DIAGNOSIS — Z12.11 SCREENING FOR COLON CANCER: ICD-10-CM

## 2025-08-11 DIAGNOSIS — Z01.419 ENCOUNTER FOR GYNECOLOGICAL EXAMINATION: Primary | ICD-10-CM

## 2025-08-11 DIAGNOSIS — Z12.31 ENCOUNTER FOR SCREENING MAMMOGRAM FOR MALIGNANT NEOPLASM OF BREAST: ICD-10-CM

## 2025-08-11 PROCEDURE — 1160F RVW MEDS BY RX/DR IN RCRD: CPT | Performed by: OBSTETRICS & GYNECOLOGY

## 2025-08-11 PROCEDURE — G0101 CA SCREEN;PELVIC/BREAST EXAM: HCPCS | Performed by: OBSTETRICS & GYNECOLOGY

## 2025-08-11 PROCEDURE — 3077F SYST BP >= 140 MM HG: CPT | Performed by: OBSTETRICS & GYNECOLOGY

## 2025-08-11 PROCEDURE — 3080F DIAST BP >= 90 MM HG: CPT | Performed by: OBSTETRICS & GYNECOLOGY

## 2025-08-11 PROCEDURE — 1159F MED LIST DOCD IN RCRD: CPT | Performed by: OBSTETRICS & GYNECOLOGY

## 2025-08-12 ENCOUNTER — TELEPHONE (OUTPATIENT)
Dept: OBSTETRICS AND GYNECOLOGY | Facility: CLINIC | Age: 70
End: 2025-08-12
Payer: MEDICARE

## 2025-08-13 DIAGNOSIS — M81.0 OSTEOPOROSIS, UNSPECIFIED OSTEOPOROSIS TYPE, UNSPECIFIED PATHOLOGICAL FRACTURE PRESENCE: ICD-10-CM

## 2025-08-13 LAB
CYTOLOGIST CVX/VAG CYTO: NORMAL
CYTOLOGY CVX/VAG DOC CYTO: NORMAL
CYTOLOGY CVX/VAG DOC THIN PREP: NORMAL
DX ICD CODE: NORMAL
HPV I/H RISK 4 DNA CVX QL PROBE+SIG AMP: NEGATIVE
OTHER STN SPEC: NORMAL
SERVICE CMNT-IMP: NORMAL
STAT OF ADQ CVX/VAG CYTO-IMP: NORMAL

## 2025-08-13 RX ORDER — ALENDRONATE SODIUM 70 MG/1
70 TABLET ORAL WEEKLY
Qty: 4 TABLET | Refills: 0 | OUTPATIENT
Start: 2025-08-13

## 2025-08-14 RX ORDER — ALENDRONATE SODIUM 70 MG/1
70 TABLET ORAL WEEKLY
Qty: 4 TABLET | Refills: 5 | Status: SHIPPED | OUTPATIENT
Start: 2025-08-14

## (undated) DEVICE — BITEBLOCK OMNI BLOC

## (undated) DEVICE — ADAPT CLN BIOGUARD AIR/H2O DISP

## (undated) DEVICE — TUBING, SUCTION, 1/4" X 10', STRAIGHT: Brand: MEDLINE

## (undated) DEVICE — ESOPHAGEAL BALLOON DILATATION CATHETER: Brand: CRE FIXED WIRE

## (undated) DEVICE — BRUSH CYTO COLONOSCOPE 7F3MM 240CM RED

## (undated) DEVICE — KT ORCA ORCAPOD DISP STRL

## (undated) DEVICE — DEV INFL CRE STERIFLATE 60CC DISP

## (undated) DEVICE — LN SMPL CO2 SHTRM SD STREAM W/M LUER

## (undated) DEVICE — SENSR O2 OXIMAX FNGR A/ 18IN NONSTR

## (undated) DEVICE — FRCP BX RADJAW4 NDL 2.8 240CM LG OG BX40

## (undated) DEVICE — CANN O2 ETCO2 FITS ALL CONN CO2 SMPL A/ 7IN DISP LF

## (undated) DEVICE — MSK ENDO PORT O2 POM ELITE CURAPLEX A/